# Patient Record
Sex: FEMALE | Race: WHITE | NOT HISPANIC OR LATINO | Employment: FULL TIME | URBAN - METROPOLITAN AREA
[De-identification: names, ages, dates, MRNs, and addresses within clinical notes are randomized per-mention and may not be internally consistent; named-entity substitution may affect disease eponyms.]

---

## 2017-02-23 ENCOUNTER — OFFICE VISIT (OUTPATIENT)
Dept: URGENT CARE | Facility: CLINIC | Age: 52
End: 2017-02-23
Payer: COMMERCIAL

## 2017-02-23 VITALS
SYSTOLIC BLOOD PRESSURE: 104 MMHG | HEART RATE: 65 BPM | RESPIRATION RATE: 18 BRPM | TEMPERATURE: 97.7 F | DIASTOLIC BLOOD PRESSURE: 76 MMHG | OXYGEN SATURATION: 95 % | WEIGHT: 188 LBS

## 2017-02-23 DIAGNOSIS — J06.9 VIRAL UPPER RESPIRATORY ILLNESS: ICD-10-CM

## 2017-02-23 DIAGNOSIS — F43.9 STRESS AT HOME: ICD-10-CM

## 2017-02-23 PROCEDURE — 99213 OFFICE O/P EST LOW 20 MIN: CPT | Performed by: FAMILY MEDICINE

## 2017-02-23 RX ORDER — LEVOTHYROXINE SODIUM 0.05 MG/1
50 TABLET ORAL
COMMUNITY
End: 2021-04-27

## 2017-02-23 ASSESSMENT — ENCOUNTER SYMPTOMS
VOMITING: 0
HEMOPTYSIS: 0
HEADACHES: 1
MYALGIAS: 1
DOUBLE VISION: 0
SWEATS: 0
HEARTBURN: 0
CHILLS: 1
SORE THROAT: 0
SHORTNESS OF BREATH: 1
NAUSEA: 0
BLURRED VISION: 0
RHINORRHEA: 1
COUGH: 1
WHEEZING: 1
FEVER: 0
DIZZINESS: 0

## 2017-02-23 ASSESSMENT — COPD QUESTIONNAIRES: COPD: 0

## 2017-02-23 NOTE — MR AVS SNAPSHOT
Zehra Adam   2017 6:15 PM   Office Visit   MRN: [id number]    Department:  [facility]   Dept Phone:  [phone]    Description:  Female : 1965   Provider:  Paul Kirkpatrick M.D.           Reason for Visit     Cough x 10 days, nonproductive cough, chest congestion, wheezing and shortness of breath    Sinus Problem x 10 days, nasal congestion, stuffy and runny nose, post nasal drip.       Allergies as of 2017     Allergen Noted Reactions    Codeine 2010         Vital Signs     Blood Pressure Pulse Temperature Respirations Weight Oxygen Saturation    104/76 mmHg 65 36.5 °C (97.7 °F) 18 85.276 kg (188 lb) 95%    Smoking Status                   Never Smoker            Basic Information     Date Of Birth Sex Race Ethnicity Preferred Language    1965 Female Unknown Unknown English      Health Maintenance        Date Due Completion Dates    IMM DTaP/Tdap/Td Vaccine (1 - Tdap) 1982 ---    MAMMOGRAM 3/4/2009 3/4/2008, 3/4/2008, 2006, 2006    COLONOSCOPY 2013 ---    IMM INFLUENZA (1) 2016 ---    PAP SMEAR 2018            Current Immunizations     No immunizations on file.      Below and/or attached are the medications your provider expects you to take. Review all of your home medications and newly ordered medications with your provider and/or pharmacist. Follow medication instructions as directed by your provider and/or pharmacist. Please keep your medication list with you and share with your provider. Update the information when medications are discontinued, doses are changed, or new medications (including over-the-counter products) are added; and carry medication information at all times in the event of emergency situations     Allergies:  CODEINE - (reactions not documented)               Medications  Valid as of: 2017 -  6:47 PM    Generic Name Brand Name Tablet Size Instructions for use    Levothyroxine Sodium (Tab)  "SYNTHROID 50 MCG Take 50 mcg by mouth Every morning on an empty stomach.        .                 Medicines prescribed today were sent to:     [id number] - [place] - [address] [place] [address]    Phone: [phone] Fax: [phone]    Open 24 Hours?: No      Medication refill instructions:       If your prescription bottle indicates you have medication refills left, it is not necessary to call your provider’s office. Please contact your pharmacy and they will refill your medication.    If your prescription bottle indicates you do not have any refills left, you may request refills at any time through one of the following ways: The online [employer] system (except Urgent Care), by calling your provider’s office, or by asking your pharmacy to contact your provider’s office with a refill request. Medication refills are processed only during regular business hours and may not be available until the next business day. Your provider may request additional information or to have a follow-up visit with you prior to refilling your medication.   *Please Note: Medication refills are assigned a new Rx number when refilled electronically. Your pharmacy may indicate that no refills were authorized even though a new prescription for the same medication is available at the pharmacy. Please request the medicine by name with the pharmacy before contacting your provider for a refill.        Instructions    Upper Respiratory Infection, Adult  Most upper respiratory infections (URIs) are a viral infection of the air passages leading to the lungs. A URI affects the nose, throat, and upper air passages. The most common type of URI is nasopharyngitis and is typically referred to as \"the common cold.\"  URIs run their course and usually go away on their own. Most of the time, a URI does not require medical attention, but sometimes a bacterial infection in the upper airways can follow a viral infection. This is called a " secondary infection. Sinus and middle ear infections are common types of secondary upper respiratory infections.  Bacterial pneumonia can also complicate a URI. A URI can worsen asthma and chronic obstructive pulmonary disease (COPD). Sometimes, these complications can require emergency medical care and may be life threatening.   CAUSES  Almost all URIs are caused by viruses. A virus is a type of germ and can spread from one person to another.   RISKS FACTORS  You may be at risk for a URI if:   · You smoke.    · You have chronic heart or lung disease.  · You have a weakened defense (immune) system.    · You are very young or very old.    · You have nasal allergies or asthma.  · You work in crowded or poorly ventilated areas.  · You work in health care facilities or schools.  SIGNS AND SYMPTOMS   Symptoms typically develop 2-3 days after you come in contact with a cold virus. Most viral URIs last 7-10 days. However, viral URIs from the influenza virus (flu virus) can last 14-18 days and are typically more severe. Symptoms may include:   · Runny or stuffy (congested) nose.    · Sneezing.    · Cough.    · Sore throat.    · Headache.    · Fatigue.    · Fever.    · Loss of appetite.    · Pain in your forehead, behind your eyes, and over your cheekbones (sinus pain).  · Muscle aches.    DIAGNOSIS   Your health care provider may diagnose a URI by:  · Physical exam.  · Tests to check that your symptoms are not due to another condition such as:  ¨ Strep throat.  ¨ Sinusitis.  ¨ Pneumonia.  ¨ Asthma.  TREATMENT   A URI goes away on its own with time. It cannot be cured with medicines, but medicines may be prescribed or recommended to relieve symptoms. Medicines may help:  · Reduce your fever.  · Reduce your cough.  · Relieve nasal congestion.  HOME CARE INSTRUCTIONS   · Take medicines only as directed by your health care provider.    · Gargle warm saltwater or take cough drops to comfort your throat as directed by your health  care provider.  · Use a warm mist humidifier or inhale steam from a shower to increase air moisture. This may make it easier to breathe.  · Drink enough fluid to keep your urine clear or pale yellow.    · Eat soups and other clear broths and maintain good nutrition.    · Rest as needed.    · Return to work when your temperature has returned to normal or as your health care provider advises. You may need to stay home longer to avoid infecting others. You can also use a face mask and careful hand washing to prevent spread of the virus.  · Increase the usage of your inhaler if you have asthma.    · Do not use any tobacco products, including cigarettes, chewing tobacco, or electronic cigarettes. If you need help quitting, ask your health care provider.  PREVENTION   The best way to protect yourself from getting a cold is to practice good hygiene.   · Avoid oral or hand contact with people with cold symptoms.    · Wash your hands often if contact occurs.    There is no clear evidence that vitamin C, vitamin E, echinacea, or exercise reduces the chance of developing a cold. However, it is always recommended to get plenty of rest, exercise, and practice good nutrition.   SEEK MEDICAL CARE IF:   · You are getting worse rather than better.    · Your symptoms are not controlled by medicine.    · You have chills.  · You have worsening shortness of breath.  · You have brown or red mucus.  · You have yellow or brown nasal discharge.  · You have pain in your face, especially when you bend forward.  · You have a fever.  · You have swollen neck glands.  · You have pain while swallowing.  · You have white areas in the back of your throat.  SEEK IMMEDIATE MEDICAL CARE IF:   · You have severe or persistent:  ¨ Headache.  ¨ Ear pain.  ¨ Sinus pain.  ¨ Chest pain.  · You have chronic lung disease and any of the following:  ¨ Wheezing.  ¨ Prolonged cough.  ¨ Coughing up blood.  ¨ A change in your usual mucus.  · You have a stiff  neck.  · You have changes in your:  ¨ Vision.  ¨ Hearing.  ¨ Thinking.  ¨ Mood.  MAKE SURE YOU:   · Understand these instructions.  · Will watch your condition.  · Will get help right away if you are not doing well or get worse.     This information is not intended to replace advice given to you by your health care provider. Make sure you discuss any questions you have with your health care provider.     Document Released: 06/13/2002 Document Revised: 05/03/2016 Document Reviewed: 03/25/2015  [employer] Interactive Patient Education ©2016 [employer].      [employer]/articles/guided-meditations  Snifflex (amazon.com)          [employer] Access Code: [email or web address]  Expires: 3/25/2017  6:47 PM    HELIX BIOMEDIX  A secure, online tool to manage your health information     [employer]’s HELIX BIOMEDIX® is a secure, online tool that connects you to your personalized health information from the privacy of your home -- day or night - making it very easy for you to manage your healthcare. Once the activation process is completed, you can even access your medical information using the HELIX BIOMEDIX flakita, which is available for free in the Apple Flakita store or Google Play store.     HEL[employer] provides the following levels of access (as shown below):   My Chart Features   [facility] Doctor [facility]  Specialists [facility]  Urgent  Care Non-[facility]  Primary Care  Doctor   Email your healthcare team securely and privately 24/7 X X X    Manage appointments: schedule your next appointment; view details of past/upcoming appointments X      Request prescription refills. X      View recent personal medical records, including lab and immunizations X X X X   View health record, including health history, allergies, medications X X X X   Read reports about your outpatient visits, procedures, consult and ER notes X X X X   See your discharge summary, which is a recap of your hospital and/or ER visit that includes your diagnosis, lab results, and care plan. X  X       How to register for [employer]:  1. Go to  [email or web address].  2. Click on the Sign Up Now box, which takes you to the New Member Sign Up page. You will need to provide the following information:  a. Enter your [employer] Access Code exactly as it appears at the top of this page. (You will not need to use this code after you’ve completed the sign-up process. If you do not sign up before the expiration date, you must request a new code.)   b. Enter your date of birth.   c. Enter your home email address.   d. Click Submit, and follow the next screen’s instructions.  3. Create a [employer] ID. This will be your [employer] login ID and cannot be changed, so think of one that is secure and easy to remember.  4. Create a [employer] password. You can change your password at any time.  5. Enter your Password Reset Question and Answer. This can be used at a later time if you forget your password.   6. Enter your e-mail address. This allows you to receive e-mail notifications when new information is available in [employer].  7. Click Sign Up. You can now view your health information.    For assistance activating your [employer] account, call [phone]

## 2017-02-24 NOTE — PROGRESS NOTES
Subjective:      Zehra Adam is a 51 y.o. female who presents with Cough and Sinus Problem            Cough  This is a new problem. The current episode started in the past 7 days. The problem has been unchanged. The problem occurs constantly. The cough is productive of sputum. Associated symptoms include chills, headaches, myalgias, nasal congestion, postnasal drip, rhinorrhea, shortness of breath and wheezing. Pertinent negatives include no chest pain, ear congestion, ear pain, fever, heartburn, hemoptysis, rash, sore throat or sweats. The symptoms are aggravated by cold air (activity). Treatments tried: nyquil, tylenol for h/a. The treatment provided mild relief. Her past medical history is significant for asthma and bronchitis. There is no history of COPD, environmental allergies or pneumonia.   Sinus Problem  Associated symptoms include chills, coughing, headaches and shortness of breath. Pertinent negatives include no ear pain or sore throat.       Review of Systems   Constitutional: Positive for chills. Negative for fever.   HENT: Positive for postnasal drip and rhinorrhea. Negative for ear pain and sore throat.    Eyes: Negative for blurred vision and double vision.   Respiratory: Positive for cough, shortness of breath and wheezing. Negative for hemoptysis.    Cardiovascular: Negative for chest pain.   Gastrointestinal: Negative for heartburn, nausea and vomiting.   Musculoskeletal: Positive for myalgias.   Skin: Negative for rash.   Neurological: Positive for headaches. Negative for dizziness.   Endo/Heme/Allergies: Negative for environmental allergies.     PMH:  has a past medical history of Thyroid disease.  MEDS:   Current outpatient prescriptions:   •  levothyroxine (SYNTHROID) 50 MCG Tab, Take 50 mcg by mouth Every morning on an empty stomach., Disp: , Rfl:   ALLERGIES:   Allergies   Allergen Reactions   • Codeine      SURGHX: History reviewed. No pertinent past surgical history.  SOCHX:  reports  that she has never smoked. She does not have any smokeless tobacco history on file. She reports that she drinks alcohol. She reports that she does not use illicit drugs.  FH: Family history was reviewed, no pertinent findings to report       Objective:     /76 mmHg  Pulse 65  Temp(Src) 36.5 °C (97.7 °F)  Resp 18  Wt 85.276 kg (188 lb)  SpO2 95%     Physical Exam   Constitutional: She appears well-developed.   HENT:   Head: Normocephalic.   Right Ear: External ear normal.   Left Ear: External ear normal.   Mouth/Throat: Oropharyngeal exudate present.   Nasal congestion   Eyes: Pupils are equal, round, and reactive to light. Right eye exhibits no discharge. Left eye exhibits no discharge.   Neck: Neck supple. No thyromegaly present.   Cardiovascular: Normal rate.  Exam reveals no friction rub.    No murmur heard.  Pulmonary/Chest: Effort normal. No respiratory distress. She has no wheezes.   Abdominal: Soft. She exhibits no distension. There is no tenderness. There is no guarding.   Lymphadenopathy:     She has no cervical adenopathy.   Neurological: She is alert.   Skin: Skin is warm and dry. No erythema.   Psychiatric: She has a normal mood and affect. Her behavior is normal.               Assessment/Plan:     1. Viral upper respiratory illness     2. Stress at home       Supportive care  Push fluids  Monitor temperature  Follow-up if symptoms worsen or fail to improve    Discussed stress reduction techniques, proper diet  Consider medication and Snifflex

## 2017-02-24 NOTE — PATIENT INSTRUCTIONS
"Upper Respiratory Infection, Adult  Most upper respiratory infections (URIs) are a viral infection of the air passages leading to the lungs. A URI affects the nose, throat, and upper air passages. The most common type of URI is nasopharyngitis and is typically referred to as \"the common cold.\"  URIs run their course and usually go away on their own. Most of the time, a URI does not require medical attention, but sometimes a bacterial infection in the upper airways can follow a viral infection. This is called a secondary infection. Sinus and middle ear infections are common types of secondary upper respiratory infections.  Bacterial pneumonia can also complicate a URI. A URI can worsen asthma and chronic obstructive pulmonary disease (COPD). Sometimes, these complications can require emergency medical care and may be life threatening.   CAUSES  Almost all URIs are caused by viruses. A virus is a type of germ and can spread from one person to another.   RISKS FACTORS  You may be at risk for a URI if:   · You smoke.    · You have chronic heart or lung disease.  · You have a weakened defense (immune) system.    · You are very young or very old.    · You have nasal allergies or asthma.  · You work in crowded or poorly ventilated areas.  · You work in health care facilities or schools.  SIGNS AND SYMPTOMS   Symptoms typically develop 2-3 days after you come in contact with a cold virus. Most viral URIs last 7-10 days. However, viral URIs from the influenza virus (flu virus) can last 14-18 days and are typically more severe. Symptoms may include:   · Runny or stuffy (congested) nose.    · Sneezing.    · Cough.    · Sore throat.    · Headache.    · Fatigue.    · Fever.    · Loss of appetite.    · Pain in your forehead, behind your eyes, and over your cheekbones (sinus pain).  · Muscle aches.    DIAGNOSIS   Your health care provider may diagnose a URI by:  · Physical exam.  · Tests to check that your symptoms are not due to " another condition such as:  ¨ Strep throat.  ¨ Sinusitis.  ¨ Pneumonia.  ¨ Asthma.  TREATMENT   A URI goes away on its own with time. It cannot be cured with medicines, but medicines may be prescribed or recommended to relieve symptoms. Medicines may help:  · Reduce your fever.  · Reduce your cough.  · Relieve nasal congestion.  HOME CARE INSTRUCTIONS   · Take medicines only as directed by your health care provider.    · Gargle warm saltwater or take cough drops to comfort your throat as directed by your health care provider.  · Use a warm mist humidifier or inhale steam from a shower to increase air moisture. This may make it easier to breathe.  · Drink enough fluid to keep your urine clear or pale yellow.    · Eat soups and other clear broths and maintain good nutrition.    · Rest as needed.    · Return to work when your temperature has returned to normal or as your health care provider advises. You may need to stay home longer to avoid infecting others. You can also use a face mask and careful hand washing to prevent spread of the virus.  · Increase the usage of your inhaler if you have asthma.    · Do not use any tobacco products, including cigarettes, chewing tobacco, or electronic cigarettes. If you need help quitting, ask your health care provider.  PREVENTION   The best way to protect yourself from getting a cold is to practice good hygiene.   · Avoid oral or hand contact with people with cold symptoms.    · Wash your hands often if contact occurs.    There is no clear evidence that vitamin C, vitamin E, echinacea, or exercise reduces the chance of developing a cold. However, it is always recommended to get plenty of rest, exercise, and practice good nutrition.   SEEK MEDICAL CARE IF:   · You are getting worse rather than better.    · Your symptoms are not controlled by medicine.    · You have chills.  · You have worsening shortness of breath.  · You have brown or red mucus.  · You have yellow or brown nasal  discharge.  · You have pain in your face, especially when you bend forward.  · You have a fever.  · You have swollen neck glands.  · You have pain while swallowing.  · You have white areas in the back of your throat.  SEEK IMMEDIATE MEDICAL CARE IF:   · You have severe or persistent:  ¨ Headache.  ¨ Ear pain.  ¨ Sinus pain.  ¨ Chest pain.  · You have chronic lung disease and any of the following:  ¨ Wheezing.  ¨ Prolonged cough.  ¨ Coughing up blood.  ¨ A change in your usual mucus.  · You have a stiff neck.  · You have changes in your:  ¨ Vision.  ¨ Hearing.  ¨ Thinking.  ¨ Mood.  MAKE SURE YOU:   · Understand these instructions.  · Will watch your condition.  · Will get help right away if you are not doing well or get worse.     This information is not intended to replace advice given to you by your health care provider. Make sure you discuss any questions you have with your health care provider.     Document Released: 06/13/2002 Document Revised: 05/03/2016 Document Reviewed: 03/25/2015  [email or web address] Interactive Patient Education ©2016 [email or web address]      [employer]/articles/guided-meditations  Snifflex (amazon.com)

## 2017-12-08 ENCOUNTER — HOSPITAL ENCOUNTER (OUTPATIENT)
Dept: LAB | Facility: MEDICAL CENTER | Age: 52
End: 2017-12-08
Attending: FAMILY MEDICINE
Payer: COMMERCIAL

## 2017-12-08 LAB
25(OH)D3 SERPL-MCNC: 35 NG/ML (ref 30–100)
ALBUMIN SERPL BCP-MCNC: 4.3 G/DL (ref 3.2–4.9)
ALBUMIN/GLOB SERPL: 1.9 G/DL
ALP SERPL-CCNC: 92 U/L (ref 30–99)
ALT SERPL-CCNC: 27 U/L (ref 2–50)
ANION GAP SERPL CALC-SCNC: 6 MMOL/L (ref 0–11.9)
AST SERPL-CCNC: 24 U/L (ref 12–45)
BASOPHILS # BLD AUTO: 0.8 % (ref 0–1.8)
BASOPHILS # BLD: 0.04 K/UL (ref 0–0.12)
BILIRUB SERPL-MCNC: 0.5 MG/DL (ref 0.1–1.5)
BUN SERPL-MCNC: 11 MG/DL (ref 8–22)
CALCIUM SERPL-MCNC: 9.9 MG/DL (ref 8.5–10.5)
CHLORIDE SERPL-SCNC: 107 MMOL/L (ref 96–112)
CHOLEST SERPL-MCNC: 264 MG/DL (ref 100–199)
CO2 SERPL-SCNC: 27 MMOL/L (ref 20–33)
CREAT SERPL-MCNC: 0.64 MG/DL (ref 0.5–1.4)
EOSINOPHIL # BLD AUTO: 0.1 K/UL (ref 0–0.51)
EOSINOPHIL NFR BLD: 2 % (ref 0–6.9)
ERYTHROCYTE [DISTWIDTH] IN BLOOD BY AUTOMATED COUNT: 44.1 FL (ref 35.9–50)
GFR SERPL CREATININE-BSD FRML MDRD: >60 ML/MIN/1.73 M 2
GLOBULIN SER CALC-MCNC: 2.3 G/DL (ref 1.9–3.5)
GLUCOSE SERPL-MCNC: 90 MG/DL (ref 65–99)
HCT VFR BLD AUTO: 44.2 % (ref 37–47)
HDLC SERPL-MCNC: 69 MG/DL
HGB BLD-MCNC: 14.5 G/DL (ref 12–16)
IMM GRANULOCYTES # BLD AUTO: 0.02 K/UL (ref 0–0.11)
IMM GRANULOCYTES NFR BLD AUTO: 0.4 % (ref 0–0.9)
LDLC SERPL CALC-MCNC: 176 MG/DL
LYMPHOCYTES # BLD AUTO: 1.87 K/UL (ref 1–4.8)
LYMPHOCYTES NFR BLD: 37.6 % (ref 22–41)
MCH RBC QN AUTO: 30.1 PG (ref 27–33)
MCHC RBC AUTO-ENTMCNC: 32.8 G/DL (ref 33.6–35)
MCV RBC AUTO: 91.7 FL (ref 81.4–97.8)
MONOCYTES # BLD AUTO: 0.57 K/UL (ref 0–0.85)
MONOCYTES NFR BLD AUTO: 11.4 % (ref 0–13.4)
NEUTROPHILS # BLD AUTO: 2.38 K/UL (ref 2–7.15)
NEUTROPHILS NFR BLD: 47.8 % (ref 44–72)
NRBC # BLD AUTO: 0 K/UL
NRBC BLD AUTO-RTO: 0 /100 WBC
PLATELET # BLD AUTO: 353 K/UL (ref 164–446)
PMV BLD AUTO: 10.6 FL (ref 9–12.9)
POTASSIUM SERPL-SCNC: 4.2 MMOL/L (ref 3.6–5.5)
PROT SERPL-MCNC: 6.6 G/DL (ref 6–8.2)
RBC # BLD AUTO: 4.82 M/UL (ref 4.2–5.4)
SODIUM SERPL-SCNC: 140 MMOL/L (ref 135–145)
TRIGL SERPL-MCNC: 94 MG/DL (ref 0–149)
TSH SERPL DL<=0.005 MIU/L-ACNC: 3.1 UIU/ML (ref 0.3–3.7)
WBC # BLD AUTO: 5 K/UL (ref 4.8–10.8)

## 2017-12-08 PROCEDURE — 85025 COMPLETE CBC W/AUTO DIFF WBC: CPT

## 2017-12-08 PROCEDURE — 36415 COLL VENOUS BLD VENIPUNCTURE: CPT

## 2017-12-08 PROCEDURE — 80053 COMPREHEN METABOLIC PANEL: CPT

## 2017-12-08 PROCEDURE — 80061 LIPID PANEL: CPT

## 2017-12-08 PROCEDURE — 82306 VITAMIN D 25 HYDROXY: CPT

## 2017-12-08 PROCEDURE — 84443 ASSAY THYROID STIM HORMONE: CPT

## 2018-01-19 ENCOUNTER — APPOINTMENT (OUTPATIENT)
Dept: DERMATOLOGY | Facility: IMAGING CENTER | Age: 53
End: 2018-01-19

## 2018-03-30 ENCOUNTER — APPOINTMENT (RX ONLY)
Dept: URBAN - METROPOLITAN AREA CLINIC 4 | Facility: CLINIC | Age: 53
Setting detail: DERMATOLOGY
End: 2018-03-30

## 2018-03-30 DIAGNOSIS — L57.0 ACTINIC KERATOSIS: ICD-10-CM

## 2018-03-30 DIAGNOSIS — L82.1 OTHER SEBORRHEIC KERATOSIS: ICD-10-CM

## 2018-03-30 PROBLEM — D48.5 NEOPLASM OF UNCERTAIN BEHAVIOR OF SKIN: Status: ACTIVE | Noted: 2018-03-30

## 2018-03-30 PROCEDURE — ? LIQUID NITROGEN

## 2018-03-30 PROCEDURE — 99212 OFFICE O/P EST SF 10 MIN: CPT | Mod: 25

## 2018-03-30 PROCEDURE — ? COUNSELING

## 2018-03-30 PROCEDURE — 11100: CPT | Mod: 59

## 2018-03-30 PROCEDURE — 17000 DESTRUCT PREMALG LESION: CPT

## 2018-03-30 PROCEDURE — ? BIOPSY BY SHAVE METHOD

## 2018-03-30 PROCEDURE — 17003 DESTRUCT PREMALG LES 2-14: CPT

## 2018-03-30 ASSESSMENT — LOCATION DETAILED DESCRIPTION DERM
LOCATION DETAILED: LEFT CENTRAL TEMPLE
LOCATION DETAILED: NASAL DORSUM
LOCATION DETAILED: LEFT NASAL SIDEWALL
LOCATION DETAILED: LEFT DISTAL PRETIBIAL REGION

## 2018-03-30 ASSESSMENT — LOCATION SIMPLE DESCRIPTION DERM
LOCATION SIMPLE: LEFT NOSE
LOCATION SIMPLE: LEFT TEMPLE
LOCATION SIMPLE: NOSE
LOCATION SIMPLE: LEFT PRETIBIAL REGION

## 2018-03-30 ASSESSMENT — LOCATION ZONE DERM
LOCATION ZONE: LEG
LOCATION ZONE: NOSE
LOCATION ZONE: FACE

## 2018-03-30 NOTE — PROCEDURE: BIOPSY BY SHAVE METHOD
Destruction After The Procedure: No
Size Of Lesion In Cm: 0
Electrodesiccation And Curettage Text: The wound bed was treated with electrodesiccation and curettage after the biopsy was performed.
Notification Instructions: Patient will be notified of biopsy results. However, patient instructed to call the office if not contacted within 2 weeks.
Consent: Written consent was obtained and risks were reviewed including but not limited to scarring, infection, bleeding, scabbing, incomplete removal, nerve damage and allergy to anesthesia.
Type Of Destruction Used: Curettage
Anesthesia Volume In Cc: 2
Wound Care: Bacitracin
Post-Care Instructions: I reviewed with the patient in detail post-care instructions. Patient is to keep the biopsy site dry overnight, and then apply bacitracin twice daily until healed. Patient may apply hydrogen peroxide soaks to remove any crusting.
Dressing: bandage
Cryotherapy Text: The wound bed was treated with cryotherapy after the biopsy was performed.
Silver Nitrate Text: The wound bed was treated with silver nitrate after the biopsy was performed.
Hemostasis: Drysol
Electrodesiccation Text: The wound bed was treated with electrodesiccation after the biopsy was performed.
Anesthesia Type: 1% lidocaine with epinephrine
Biopsy Type: H and E
Lab: [id number]
Curettage Text: The wound bed was treated with curettage after the biopsy was performed.
Detail Level: Detailed
Biopsy Method: Personna blade
Lab Facility: 
Billing Type: Third-Party Bill

## 2019-02-13 ENCOUNTER — OFFICE VISIT (OUTPATIENT)
Dept: URGENT CARE | Facility: CLINIC | Age: 54
End: 2019-02-13
Payer: COMMERCIAL

## 2019-02-13 VITALS
TEMPERATURE: 97.8 F | DIASTOLIC BLOOD PRESSURE: 80 MMHG | RESPIRATION RATE: 16 BRPM | SYSTOLIC BLOOD PRESSURE: 110 MMHG | OXYGEN SATURATION: 97 % | BODY MASS INDEX: 25.62 KG/M2 | HEIGHT: 69 IN | HEART RATE: 70 BPM | WEIGHT: 173 LBS

## 2019-02-13 DIAGNOSIS — J02.8 SORE THROAT (VIRAL): ICD-10-CM

## 2019-02-13 DIAGNOSIS — J11.1 INFLUENZA-LIKE ILLNESS: Primary | ICD-10-CM

## 2019-02-13 DIAGNOSIS — R05.9 COUGH: ICD-10-CM

## 2019-02-13 DIAGNOSIS — B97.89 SORE THROAT (VIRAL): ICD-10-CM

## 2019-02-13 LAB
INT CON NEG: NORMAL
INT CON POS: NORMAL
S PYO AG THROAT QL: NEGATIVE

## 2019-02-13 PROCEDURE — 99214 OFFICE O/P EST MOD 30 MIN: CPT | Performed by: PHYSICIAN ASSISTANT

## 2019-02-13 PROCEDURE — 87880 STREP A ASSAY W/OPTIC: CPT | Performed by: PHYSICIAN ASSISTANT

## 2019-02-13 RX ORDER — OSELTAMIVIR PHOSPHATE 75 MG/1
75 CAPSULE ORAL 2 TIMES DAILY
Qty: 10 CAP | Refills: 0 | Status: SHIPPED | OUTPATIENT
Start: 2019-02-13 | End: 2020-02-04

## 2019-02-13 NOTE — LETTER
February 13, 2019         Patient: Zehra Cramer   YOB: 1965   Date of Visit: 2/13/2019           To Whom it May Concern:    Zehra Cramer was seen in my clinic on 2/13/2019. She may return to work on 2/18/2019 or sooner if condition improves sooner.     If you have any questions or concerns, please don't hesitate to call.        Sincerely,           Tracy Perez P.A.-C.  Electronically Signed

## 2019-02-13 NOTE — PROGRESS NOTES
Subjective:      Zehra Cramer is a 53 y.o. female who presents with Pharyngitis (x yesterday, sore throat, pain to swallow, white spots on back of throat, fever and bodyaches)    PMH:  has a past medical history of Thyroid disease.  MEDS:   Current Outpatient Prescriptions:   •  Hydrocod Polst-CPM Polst ER (TUSSIONEX) 10-8 MG/5ML Suspension Extended Release, Take 5 mL by mouth every 12 hours for 7 days., Disp: 70 mL, Rfl: 0  •  oseltamivir (TAMIFLU) 75 MG Cap, Take 1 Cap by mouth 2 times a day., Disp: 10 Cap, Rfl: 0  •  levothyroxine (SYNTHROID) 50 MCG Tab, Take 50 mcg by mouth Every morning on an empty stomach., Disp: , Rfl:   •  polymixin-trimethoprim (POLYTRIM) 91419-8.1 UNIT/ML-% Solution, Place 1 Drop in both eyes every 4 hours., Disp: 10 mL, Rfl: 0  ALLERGIES:   Allergies   Allergen Reactions   • Codeine      SURGHX: History reviewed. No pertinent surgical history.  SOCHX:  reports that she has never smoked. She has never used smokeless tobacco. She reports that she drinks alcohol. She reports that she does not use drugs.  FH: Reviewed with patient, not pertinent to this visit.           Patient presents with:  Pharyngitis: x yesterday, sore throat, pain to swallow, white spots on back of throat, fever and body aches, cough , congestion.           URI    This is a new problem. The current episode started yesterday. The problem has been rapidly worsening. The maximum temperature recorded prior to her arrival was 100.4 - 100.9 F. The fever has been present for less than 1 day. Associated symptoms include congestion, coughing, ear pain, headaches, a plugged ear sensation and a sore throat. Pertinent negatives include no vomiting or wheezing. She has tried acetaminophen and increased fluids for the symptoms. The treatment provided no relief.       Review of Systems   Constitutional: Positive for fever.   HENT: Positive for congestion, ear pain and sore throat.    Respiratory: Positive for cough and  "sputum production. Negative for wheezing.    Gastrointestinal: Negative for vomiting.   Neurological: Positive for headaches.   All other systems reviewed and are negative.         Objective:     /80 (BP Location: Left arm, Patient Position: Sitting, BP Cuff Size: Large adult)   Pulse 70   Temp 36.6 °C (97.8 °F) (Temporal)   Resp 16   Ht 1.753 m (5' 9\")   Wt 78.5 kg (173 lb)   SpO2 97%   BMI 25.55 kg/m²      Physical Exam   Constitutional: She is oriented to person, place, and time. She appears well-developed and well-nourished. She appears ill. No distress.   HENT:   Head: Normocephalic.   Right Ear: External ear normal.   Left Ear: External ear normal.   Mouth/Throat: Oropharynx is clear and moist.   Eyes: Pupils are equal, round, and reactive to light. EOM are normal.   Neck: Normal range of motion. Neck supple.   Cardiovascular: Normal rate and regular rhythm.    Pulmonary/Chest: Effort normal. No respiratory distress. She has rhonchi. She has no rales.   Abdominal: Soft.   Musculoskeletal: Normal range of motion.   Lymphadenopathy:     She has no cervical adenopathy.   Neurological: She is alert and oriented to person, place, and time.   Skin: Skin is warm and dry.   Psychiatric: She has a normal mood and affect.   Nursing note and vitals reviewed.         Assessment/Plan:     1. Influenza-like illness  Hydrocod Polst-CPM Polst ER (TUSSIONEX) 10-8 MG/5ML Suspension Extended Release    oseltamivir (TAMIFLU) 75 MG Cap   2. Cough  Hydrocod Polst-CPM Polst ER (TUSSIONEX) 10-8 MG/5ML Suspension Extended Release    oseltamivir (TAMIFLU) 75 MG Cap   3. Sore throat (viral)  Hydrocod Polst-CPM Polst ER (TUSSIONEX) 10-8 MG/5ML Suspension Extended Release    oseltamivir (TAMIFLU) 75 MG Cap     Motrin/Advil/Ibuprophen 600 mg every 6 hours as needed for pain or fever.    PT instructed not to drive or operate heavy machinery or drink alcohol while taking this medication because it contains either a narcotic or " benzodiazepines which causes drowsiness. PT verbalized understanding of these instructions.     [facility] Aware web site evaluation: I have obtained and reviewed patient utilization report from [facility] pharmacy database prior to writing prescription for controlled substance.  No history of abuse.    PT should follow up with PCP in 1-2 days for re-evaluation if symptoms have not improved.  Discussed red flags and reasons to return to UC or ED.  Pt and/or family verbalized understanding of diagnosis and follow up instructions and was offered informational handout on diagnosis.  PT discharged.

## 2019-02-15 ENCOUNTER — OFFICE VISIT (OUTPATIENT)
Dept: URGENT CARE | Facility: MEDICAL CENTER | Age: 54
End: 2019-02-15
Payer: COMMERCIAL

## 2019-02-15 VITALS
SYSTOLIC BLOOD PRESSURE: 124 MMHG | HEART RATE: 72 BPM | BODY MASS INDEX: 25.4 KG/M2 | TEMPERATURE: 98.5 F | DIASTOLIC BLOOD PRESSURE: 76 MMHG | OXYGEN SATURATION: 95 % | WEIGHT: 172 LBS | RESPIRATION RATE: 16 BRPM

## 2019-02-15 DIAGNOSIS — H10.31 ACUTE BACTERIAL CONJUNCTIVITIS OF RIGHT EYE: Primary | ICD-10-CM

## 2019-02-15 PROCEDURE — 99214 OFFICE O/P EST MOD 30 MIN: CPT | Performed by: PHYSICIAN ASSISTANT

## 2019-02-15 RX ORDER — POLYMYXIN B SULFATE AND TRIMETHOPRIM 1; 10000 MG/ML; [USP'U]/ML
1 SOLUTION OPHTHALMIC EVERY 4 HOURS
Qty: 10 ML | Refills: 0 | Status: SHIPPED | OUTPATIENT
Start: 2019-02-15 | End: 2020-02-04

## 2019-02-15 ASSESSMENT — ENCOUNTER SYMPTOMS
VOMITING: 0
HEADACHES: 1
WHEEZING: 0
SPUTUM PRODUCTION: 1
FEVER: 1
COUGH: 1
SORE THROAT: 1

## 2019-02-16 NOTE — PROGRESS NOTES
"Subjective:      Pt is a 53 y.o. female who presents with Eye Drainage            HPI  This is a new problem. Pt presents to the urgent care today with a CC of a watery, swollen, red right eye. Pt states this started this morning. Pt notes her  had this issue a few days ago. Pt states the pain in the eye is 3/10, mostly feels like pressure and aching with redness and yellow matting. Admits to the eye feeling \"itchy\" and that vision is slightly blurred. Pt denies double vision. Pt denies CP, SOB, NVD, paresthesias, headaches, dizziness, hives, or joint pain. Pt has not taken any medications for this condition. The pt's medication list, problem list, and allergies have been evaluated and reviewed during today's visit.    PMH:  Past Medical History:   Diagnosis Date   • Thyroid disease        PSH:  Negative per pt.      Fam Hx:  the patient's family history is not pertinent to their current complaint    Soc HX:  Social History     Social History   • Marital status:      Spouse name: N/A   • Number of children: N/A   • Years of education: N/A     Occupational History   • Not on file.     Social History Main Topics   • Smoking status: Never Smoker   • Smokeless tobacco: Never Used   • Alcohol use Yes      Comment: occasional 2 drinks   • Drug use: No   • Sexual activity: Not on file     Other Topics Concern   • Not on file     Social History Narrative    ** Merged History Encounter **              Medications:    Current Outpatient Prescriptions:   •  polymixin-trimethoprim (POLYTRIM) 58831-2.1 UNIT/ML-% Solution, Place 1 Drop in both eyes every 4 hours., Disp: 10 mL, Rfl: 0  •  Hydrocod Polst-CPM Polst ER (TUSSIONEX) 10-8 MG/5ML Suspension Extended Release, Take 5 mL by mouth every 12 hours for 7 days., Disp: 70 mL, Rfl: 0  •  oseltamivir (TAMIFLU) 75 MG Cap, Take 1 Cap by mouth 2 times a day., Disp: 10 Cap, Rfl: 0  •  levothyroxine (SYNTHROID) 50 MCG Tab, Take 50 mcg by mouth Every morning on an empty " stomach., Disp: , Rfl:       Allergies:  Codeine    ROS  Constitutional: Negative for fever, chills and malaise/fatigue.   HENT: Negative for congestion and sore throat.    Eyes: Positive for blurred vision, eye fullness/itchy sensation, yellow matting/discharge and redness. Negative for double vision and photophobia.   Respiratory: Negative for cough and shortness of breath.  Cardiovascular: Negative for chest pain and palpitations.   Gastrointestinal: Negative for nausea, vomiting, abdominal pain, diarrhea and constipation.   Genitourinary: Negative for dysuria and flank pain.   Musculoskeletal: Negative for joint pain and myalgias.   Skin: Negative for itching and rash.   Neurological: Negative for dizziness, tingling, weakness and headaches.   Endo/Heme/Allergies: Does not bruise/bleed easily.   Psychiatric/Behavioral: Negative for depression. The patient is not nervous/anxious.    All other systems reviewed and are negative.         Objective:     /76   Pulse 72   Temp 36.9 °C (98.5 °F)   Resp 16   Wt 78 kg (172 lb)   SpO2 95%   BMI 25.40 kg/m²      Physical Exam       Constitutional: PT is oriented to person, place, and time. PT appears well-developed and well-nourished. No distress.   HENT:   Head: Normocephalic and atraumatic.   Nose: Nose normal.   Mouth/Throat: Oropharynx is clear and moist. No oropharyngeal exudate.   Eyes: EOM are normal. Conjunctiva on right injected. Pupils are equal, round, and reactive to light. Right eye exhibits discharge. Left eye exhibits no discharge. No scleral icterus.   Neck: Normal range of motion. Neck supple. No thyromegaly present.   Cardiovascular: Normal rate, regular rhythm, normal heart sounds and intact distal pulses.  Exam reveals no gallop and no friction rub.    No murmur heard.  Pulmonary/Chest: Breath sounds normal. No respiratory distress. PT has no wheezes. PT has no rales. PT exhibits no tenderness.   Abdominal: Soft. Bowel sounds are normal. PT  exhibits no distension and no mass. There is no tenderness. There is no rebound and no guarding.   Musculoskeletal: Normal range of motion. PT exhibits no edema and no tenderness.   Neurological: PT is alert and oriented to person, place, and time. No cranial nerve deficit.   Skin: Skin is warm and dry. No rash noted. No erythema.   Psychiatric: PT has a normal mood and affect. PT behavior is normal. Judgment and thought content normal.        Assessment/Plan:     1. Acute bacterial conjunctivitis of right eye    - polymixin-trimethoprim (POLYTRIM) 58268-1.1 UNIT/ML-% Solution; Place 1 Drop in both eyes every 4 hours.  Dispense: 10 mL; Refill: 0    Warm compresses for right eye encouraged and discussed  Rest, fluids encouraged.  OTC decongestant for congestion  AVS with medical info given.  Pt was in full understanding and agreement with the plan.  Follow-up as needed if symptoms worsen or fail to improve.

## 2019-02-16 NOTE — PATIENT INSTRUCTIONS
Bacterial Conjunctivitis  Introduction  Bacterial conjunctivitis is an infection of your conjunctiva. This is the clear membrane that covers the white part of your eye and the inner surface of your eyelid. This condition can make your eye:  · Red or pink.  · Itchy.  This condition is caused by bacteria. This condition spreads very easily from person to person (is contagious) and from one eye to the other eye.  Follow these instructions at home:  Medicines  · Take or apply your antibiotic medicine as told by your doctor. Do not stop taking or applying the antibiotic even if you start to feel better.  · Take or apply over-the-counter and prescription medicines only as told by your doctor.  · Do not touch your eyelid with the eye drop bottle or the ointment tube.  Managing discomfort  · Wipe any fluid from your eye with a warm, wet washcloth or a cotton ball.  · Place a cool, clean washcloth on your eye. Do this for 10-20 minutes, 3-4 times per day.  General instructions  · Do not wear contact lenses until the irritation is gone. Wear glasses until your doctor says it is okay to wear contacts.  · Do not wear eye makeup until your symptoms are gone. Throw away any old makeup.  · Change or wash your pillowcase every day.  · Do not share towels or washcloths with anyone.  · Wash your hands often with soap and water. Use paper towels to dry your hands.  · Do not touch or rub your eyes.  · Do not drive or use heavy machinery if your vision is blurry.  Contact a doctor if:  · You have a fever.  · Your symptoms do not get better after 10 days.  Get help right away if:  · You have a fever and your symptoms suddenly get worse.  · You have very bad pain when you move your eye.  · Your face:  ¨ Hurts.  ¨ Is red.  ¨ Is swollen.  · You have sudden loss of vision.  This information is not intended to replace advice given to you by your health care provider. Make sure you discuss any questions you have with your health care  \"Reviewed record in preparation for visit and have obtained the necessary documentation\"    Chief Complaint   Patient presents with    Sleep Problem     \"not able to stay asleep\", hx of right shoulder surgery 12/2014 sleep pattern has been off since, not able to sleep more than 2-4 hours at a time         1. Have you been to the ER, urgent care clinic since your last visit? Hospitalized since your last visit? No    2. Have you seen or consulted any other health care providers outside of the [address] [place] [address] since your last visit? Include any pap smears or colon screening.  No provider.  Document Released: 09/26/2009 Document Revised: 05/25/2017 Document Reviewed: 09/29/2016  © 2017 [employer]

## 2019-03-04 ENCOUNTER — HOSPITAL ENCOUNTER (OUTPATIENT)
Dept: LAB | Facility: MEDICAL CENTER | Age: 54
End: 2019-03-04
Attending: NURSE PRACTITIONER
Payer: COMMERCIAL

## 2019-03-04 LAB — HETEROPH AB SER QL: NEGATIVE

## 2019-03-04 PROCEDURE — 36415 COLL VENOUS BLD VENIPUNCTURE: CPT

## 2019-03-04 PROCEDURE — 86308 HETEROPHILE ANTIBODY SCREEN: CPT

## 2019-12-17 ENCOUNTER — TELEPHONE (OUTPATIENT)
Dept: CARDIOLOGY | Facility: MEDICAL CENTER | Age: 54
End: 2019-12-17

## 2019-12-17 NOTE — TELEPHONE ENCOUNTER
Received a phone call from Dr. Lomax.  Dr. Lomax is requesting that this patient's appointment with Dr. Pack be moved up sooner if possible.      Routed to Scheduling for f/u.    Dr. Pack: FYI only.

## 2019-12-26 ENCOUNTER — TELEPHONE (OUTPATIENT)
Dept: CARDIOLOGY | Facility: MEDICAL CENTER | Age: 54
End: 2019-12-26

## 2019-12-26 ENCOUNTER — OFFICE VISIT (OUTPATIENT)
Dept: CARDIOLOGY | Facility: MEDICAL CENTER | Age: 54
End: 2019-12-26
Payer: COMMERCIAL

## 2019-12-26 VITALS
SYSTOLIC BLOOD PRESSURE: 110 MMHG | BODY MASS INDEX: 27.59 KG/M2 | WEIGHT: 186.29 LBS | DIASTOLIC BLOOD PRESSURE: 70 MMHG | OXYGEN SATURATION: 94 % | HEIGHT: 69 IN | HEART RATE: 74 BPM

## 2019-12-26 DIAGNOSIS — I48.91 ATRIAL FIBRILLATION, UNSPECIFIED TYPE (HCC): ICD-10-CM

## 2019-12-26 LAB — EKG IMPRESSION: NORMAL

## 2019-12-26 PROCEDURE — 93000 ELECTROCARDIOGRAM COMPLETE: CPT | Performed by: INTERNAL MEDICINE

## 2019-12-26 PROCEDURE — 99204 OFFICE O/P NEW MOD 45 MIN: CPT | Performed by: INTERNAL MEDICINE

## 2019-12-26 RX ORDER — METOPROLOL SUCCINATE 25 MG/1
25 TABLET, EXTENDED RELEASE ORAL DAILY
Qty: 90 TAB | Refills: 3 | Status: SHIPPED | OUTPATIENT
Start: 2019-12-26 | End: 2020-02-04 | Stop reason: SDUPTHER

## 2019-12-26 ASSESSMENT — ENCOUNTER SYMPTOMS
FEVER: 0
ORTHOPNEA: 0
CONSTIPATION: 0
ALTERED MENTAL STATUS: 0
IRREGULAR HEARTBEAT: 0
SHORTNESS OF BREATH: 0
FLANK PAIN: 0
WEIGHT GAIN: 0
HEARTBURN: 0
COUGH: 0
DECREASED APPETITE: 0
NEAR-SYNCOPE: 0
DYSPNEA ON EXERTION: 0
DEPRESSION: 0
SYNCOPE: 0
ABDOMINAL PAIN: 0
DIARRHEA: 0
BLURRED VISION: 0
BACK PAIN: 0
NAUSEA: 0
PALPITATIONS: 0
PND: 0
VOMITING: 0
CLAUDICATION: 0
DIZZINESS: 0
WEIGHT LOSS: 0

## 2019-12-26 NOTE — PROGRESS NOTES
"Cardiology Note    Chief Complaint   Patient presents with   • Atrial Fibrillation       History of Present Illness: Zehra Adam is a 54 y.o. female who presents for evaluation.    Describes earlier this month felt palpitations. Was at a company m[employer] party. Woke up with palpitations. Unclear how long. Used apple watch to diagnose atrial fibrillation.  used to work for [employer] and was familiar with rhythm.    \"Measures coffee by liters.\"  says. Patient says has been trying to cut down to one cup. Alcohol <1 drink per day. Denies tobacco or illicit drugs.    Review of Systems   Constitution: Negative for decreased appetite, fever, malaise/fatigue, weight gain and weight loss.   HENT: Negative for congestion and nosebleeds.    Eyes: Negative for blurred vision.   Cardiovascular: Negative for chest pain, claudication, dyspnea on exertion, irregular heartbeat, leg swelling, near-syncope, orthopnea, palpitations, paroxysmal nocturnal dyspnea and syncope.   Respiratory: Negative for cough and shortness of breath.    Endocrine: Negative for cold intolerance and heat intolerance.   Skin: Negative for rash.   Musculoskeletal: Negative for back pain.   Gastrointestinal: Negative for abdominal pain, constipation, diarrhea, heartburn, melena, nausea and vomiting.   Genitourinary: Negative for dysuria, flank pain and hematuria.   Neurological: Negative for dizziness.   Psychiatric/Behavioral: Negative for altered mental status and depression.         Past Medical History:   Diagnosis Date   • Thyroid disease          No past surgical history on file.      Current Outpatient Medications   Medication Sig Dispense Refill   • aspirin EC (ECOTRIN) 81 MG Tablet Delayed Response Take 1 Tab by mouth every day. 90 Tab 3   • metoprolol SR (TOPROL XL) 25 MG TABLET SR 24 HR Take 1 Tab by mouth every day. 90 Tab 3   • levothyroxine (SYNTHROID) 50 MCG Tab Take 50 mcg by mouth Every morning on an empty " stomach.     • polymixin-trimethoprim (POLYTRIM) 30068-6.1 UNIT/ML-% Solution Place 1 Drop in both eyes every 4 hours. (Patient not taking: Reported on 12/26/2019) 10 mL 0   • oseltamivir (TAMIFLU) 75 MG Cap Take 1 Cap by mouth 2 times a day. (Patient not taking: Reported on 12/26/2019) 10 Cap 0     No current facility-administered medications for this visit.          Allergies   Allergen Reactions   • Codeine          No family history on file.      Social History     Socioeconomic History   • Marital status:      Spouse name: Not on file   • Number of children: Not on file   • Years of education: Not on file   • Highest education level: Not on file   Occupational History   • Not on file   Social Needs   • Financial resource strain: Not on file   • Food insecurity:     Worry: Not on file     Inability: Not on file   • Transportation needs:     Medical: Not on file     Non-medical: Not on file   Tobacco Use   • Smoking status: Never Smoker   • Smokeless tobacco: Never Used   Substance and Sexual Activity   • Alcohol use: Yes     Comment: occasional 2 drinks   • Drug use: No   • Sexual activity: Not on file   Lifestyle   • Physical activity:     Days per week: Not on file     Minutes per session: Not on file   • Stress: Not on file   Relationships   • Social connections:     Talks on phone: Not on file     Gets together: Not on file     Attends Orthodox service: Not on file     Active member of club or organization: Not on file     Attends meetings of clubs or organizations: Not on file     Relationship status: Not on file   • Intimate partner violence:     Fear of current or ex partner: Not on file     Emotionally abused: Not on file     Physically abused: Not on file     Forced sexual activity: Not on file   Other Topics Concern   • Not on file   Social History Narrative    ** Merged History Encounter **              Physical Exam:  Ambulatory Vitals  /70 (BP Location: Left arm, Patient Position:  "Sitting, BP Cuff Size: Adult)   Pulse 74   Ht 1.753 m (5' 9\")   Wt 84.5 kg (186 lb 4.6 oz)   SpO2 94%    BP Readings from Last 4 Encounters:   12/26/19 110/70   02/15/19 124/76   02/13/19 110/80   02/23/17 104/76     Weight/BMI:   Vitals:    12/26/19 1325   BP: 110/70   Weight: 84.5 kg (186 lb 4.6 oz)   Height: 1.753 m (5' 9\")    Body mass index is 27.51 kg/m².  Wt Readings from Last 4 Encounters:   12/26/19 84.5 kg (186 lb 4.6 oz)   02/15/19 78 kg (172 lb)   02/13/19 78.5 kg (173 lb)   02/23/17 85.3 kg (188 lb)       Physical Exam   Constitutional: She is oriented to person, place, and time and well-developed, well-nourished, and in no distress. No distress.   HENT:   Head: Normocephalic and atraumatic.   Eyes: Pupils are equal, round, and reactive to light. Conjunctivae are normal.   Neck: Normal range of motion. Neck supple. No JVD present.   Cardiovascular: Normal rate, regular rhythm, normal heart sounds and intact distal pulses. Exam reveals no gallop and no friction rub.   No murmur heard.  Pulmonary/Chest: Effort normal and breath sounds normal. No respiratory distress. She has no wheezes. She has no rales. She exhibits no tenderness.   Abdominal: Soft. Bowel sounds are normal. She exhibits no distension.   Musculoskeletal:         General: No edema.   Neurological: She is alert and oriented to person, place, and time.   Skin: Skin is warm and dry.   Psychiatric: Affect and judgment normal.       Lab Data Review:  Lab Results   Component Value Date/Time    CHOLSTRLTOT 264 (H) 12/08/2017 07:59 AM     (H) 12/08/2017 07:59 AM    HDL 69 12/08/2017 07:59 AM    TRIGLYCERIDE 94 12/08/2017 07:59 AM       Lab Results   Component Value Date/Time    SODIUM 140 12/08/2017 07:59 AM    POTASSIUM 4.2 12/08/2017 07:59 AM    CHLORIDE 107 12/08/2017 07:59 AM    CO2 27 12/08/2017 07:59 AM    GLUCOSE 90 12/08/2017 07:59 AM    BUN 11 12/08/2017 07:59 AM    CREATININE 0.64 12/08/2017 07:59 AM     CrCl cannot be " calculated (Patient's most recent lab result is older than the maximum 7 days allowed.).  Lab Results   Component Value Date/Time    ALKPHOSPHAT 92 12/08/2017 07:59 AM    ASTSGOT 24 12/08/2017 07:59 AM    ALTSGPT 27 12/08/2017 07:59 AM    TBILIRUBIN 0.5 12/08/2017 07:59 AM      Lab Results   Component Value Date/Time    WBC 5.0 12/08/2017 07:59 AM     No results found for: HBA1C  No components found for: TROP      Cardiac Imaging and Procedures Review:      TTE sinus with PACs and compensatory pause    Medical Decision Making:  Problem List Items Addressed This Visit     Atrial fibrillation (HCC)     Chadsvasc only significant for female gender. Will maintain on aspirin for now. Metoprolol for attempt maintenance sinus and rate control should it recur. Zio patch to check for burden. TTE to r/o structural abnormality.          Relevant Medications    metoprolol SR (TOPROL XL) 25 MG TABLET SR 24 HR    Other Relevant Orders    EKG (Completed)    EC-ECHOCARDIOGRAM COMPLETE W/O CONT    Holter Monitor / Event Recorder          It was my pleasure to meet with Ms. Adam.

## 2019-12-26 NOTE — TELEPHONE ENCOUNTER
Pt had question after appointment today regarding when metoprolol should be started. She requested a call back.     Per Dr. Harrell he had discussed with them that metoprolol can be held until after event monitor is completed at the request of pts  who works with [employer].    Called pt and unable to reach her. Message left regarding the above and to call back if further questions.

## 2019-12-26 NOTE — ASSESSMENT & PLAN NOTE
Chadsvasc only significant for female gender. Will maintain on aspirin for now. Metoprolol for attempt maintenance sinus and rate control should it recur. Zio patch to check for burden. TTE to r/o structural abnormality.

## 2019-12-26 NOTE — TELEPHONE ENCOUNTER
Message   Received: Today   Message Contents   Jen Gonzalez, Med Ass't  Beryl Snyder R.N.   Caller: Unspecified (1 week ago)             I called patient and they are coming in today at 1:20.     Thank you!   Jen GARCIA

## 2020-01-02 ENCOUNTER — HOSPITAL ENCOUNTER (OUTPATIENT)
Dept: CARDIOLOGY | Facility: MEDICAL CENTER | Age: 55
End: 2020-01-02
Attending: INTERNAL MEDICINE
Payer: COMMERCIAL

## 2020-01-02 DIAGNOSIS — I48.91 ATRIAL FIBRILLATION, UNSPECIFIED TYPE (HCC): ICD-10-CM

## 2020-01-02 LAB
LV EJECT FRACT MOD 2C 99903: 75.22
LV EJECT FRACT MOD 4C 99902: 70.47
LV EJECT FRACT MOD BP 99901: 73.94

## 2020-01-02 PROCEDURE — 93306 TTE W/DOPPLER COMPLETE: CPT | Mod: 26 | Performed by: INTERNAL MEDICINE

## 2020-01-02 PROCEDURE — 93306 TTE W/DOPPLER COMPLETE: CPT

## 2020-01-06 ENCOUNTER — HOSPITAL ENCOUNTER (OUTPATIENT)
Dept: LAB | Facility: MEDICAL CENTER | Age: 55
End: 2020-01-06
Attending: FAMILY MEDICINE
Payer: COMMERCIAL

## 2020-01-06 LAB
25(OH)D3 SERPL-MCNC: 43 NG/ML (ref 30–100)
ALBUMIN SERPL BCP-MCNC: 4.4 G/DL (ref 3.2–4.9)
ALBUMIN/GLOB SERPL: 1.5 G/DL
ALP SERPL-CCNC: 85 U/L (ref 30–99)
ALT SERPL-CCNC: 26 U/L (ref 2–50)
ANION GAP SERPL CALC-SCNC: 9 MMOL/L (ref 0–11.9)
AST SERPL-CCNC: 22 U/L (ref 12–45)
BASOPHILS # BLD AUTO: 1.5 % (ref 0–1.8)
BASOPHILS # BLD: 0.07 K/UL (ref 0–0.12)
BILIRUB SERPL-MCNC: 0.5 MG/DL (ref 0.1–1.5)
BUN SERPL-MCNC: 15 MG/DL (ref 8–22)
CALCIUM SERPL-MCNC: 9.5 MG/DL (ref 8.5–10.5)
CHLORIDE SERPL-SCNC: 104 MMOL/L (ref 96–112)
CHOLEST SERPL-MCNC: 264 MG/DL (ref 100–199)
CO2 SERPL-SCNC: 27 MMOL/L (ref 20–33)
CREAT SERPL-MCNC: 0.76 MG/DL (ref 0.5–1.4)
EOSINOPHIL # BLD AUTO: 0.09 K/UL (ref 0–0.51)
EOSINOPHIL NFR BLD: 1.9 % (ref 0–6.9)
ERYTHROCYTE [DISTWIDTH] IN BLOOD BY AUTOMATED COUNT: 45.6 FL (ref 35.9–50)
GLOBULIN SER CALC-MCNC: 3 G/DL (ref 1.9–3.5)
GLUCOSE SERPL-MCNC: 87 MG/DL (ref 65–99)
HCT VFR BLD AUTO: 43.9 % (ref 37–47)
HDLC SERPL-MCNC: 77 MG/DL
HGB BLD-MCNC: 14.1 G/DL (ref 12–16)
IMM GRANULOCYTES # BLD AUTO: 0.01 K/UL (ref 0–0.11)
IMM GRANULOCYTES NFR BLD AUTO: 0.2 % (ref 0–0.9)
LDLC SERPL CALC-MCNC: 172 MG/DL
LYMPHOCYTES # BLD AUTO: 1.73 K/UL (ref 1–4.8)
LYMPHOCYTES NFR BLD: 37 % (ref 22–41)
MCH RBC QN AUTO: 30 PG (ref 27–33)
MCHC RBC AUTO-ENTMCNC: 32.1 G/DL (ref 33.6–35)
MCV RBC AUTO: 93.4 FL (ref 81.4–97.8)
MONOCYTES # BLD AUTO: 0.63 K/UL (ref 0–0.85)
MONOCYTES NFR BLD AUTO: 13.5 % (ref 0–13.4)
NEUTROPHILS # BLD AUTO: 2.15 K/UL (ref 2–7.15)
NEUTROPHILS NFR BLD: 45.9 % (ref 44–72)
NRBC # BLD AUTO: 0 K/UL
NRBC BLD-RTO: 0 /100 WBC
PLATELET # BLD AUTO: 328 K/UL (ref 164–446)
PMV BLD AUTO: 10.5 FL (ref 9–12.9)
POTASSIUM SERPL-SCNC: 3.8 MMOL/L (ref 3.6–5.5)
PROT SERPL-MCNC: 7.4 G/DL (ref 6–8.2)
RBC # BLD AUTO: 4.7 M/UL (ref 4.2–5.4)
SODIUM SERPL-SCNC: 140 MMOL/L (ref 135–145)
TRIGL SERPL-MCNC: 77 MG/DL (ref 0–149)
TSH SERPL DL<=0.005 MIU/L-ACNC: 3.6 UIU/ML (ref 0.38–5.33)
WBC # BLD AUTO: 4.7 K/UL (ref 4.8–10.8)

## 2020-01-06 PROCEDURE — 36415 COLL VENOUS BLD VENIPUNCTURE: CPT

## 2020-01-06 PROCEDURE — 85025 COMPLETE CBC W/AUTO DIFF WBC: CPT

## 2020-01-06 PROCEDURE — 84443 ASSAY THYROID STIM HORMONE: CPT

## 2020-01-06 PROCEDURE — 80061 LIPID PANEL: CPT

## 2020-01-06 PROCEDURE — 80053 COMPREHEN METABOLIC PANEL: CPT

## 2020-01-06 PROCEDURE — 82306 VITAMIN D 25 HYDROXY: CPT

## 2020-01-14 ENCOUNTER — TELEPHONE (OUTPATIENT)
Dept: CARDIOLOGY | Facility: MEDICAL CENTER | Age: 55
End: 2020-01-14

## 2020-01-14 ENCOUNTER — NON-PROVIDER VISIT (OUTPATIENT)
Dept: CARDIOLOGY | Facility: MEDICAL CENTER | Age: 55
End: 2020-01-14
Payer: COMMERCIAL

## 2020-01-14 DIAGNOSIS — I48.91 ATRIAL FIBRILLATION, UNSPECIFIED TYPE (HCC): ICD-10-CM

## 2020-01-14 DIAGNOSIS — I49.1 ATRIAL ECTOPY: ICD-10-CM

## 2020-02-04 ENCOUNTER — OFFICE VISIT (OUTPATIENT)
Dept: CARDIOLOGY | Facility: MEDICAL CENTER | Age: 55
End: 2020-02-04
Payer: COMMERCIAL

## 2020-02-04 VITALS
HEIGHT: 69 IN | DIASTOLIC BLOOD PRESSURE: 80 MMHG | WEIGHT: 182.76 LBS | OXYGEN SATURATION: 96 % | BODY MASS INDEX: 27.07 KG/M2 | HEART RATE: 56 BPM | SYSTOLIC BLOOD PRESSURE: 100 MMHG | RESPIRATION RATE: 12 BRPM

## 2020-02-04 DIAGNOSIS — I48.91 ATRIAL FIBRILLATION, UNSPECIFIED TYPE (HCC): ICD-10-CM

## 2020-02-04 DIAGNOSIS — E78.5 HYPERLIPIDEMIA, UNSPECIFIED HYPERLIPIDEMIA TYPE: ICD-10-CM

## 2020-02-04 PROBLEM — Z00.00 HEALTHCARE MAINTENANCE: Status: ACTIVE | Noted: 2020-02-04

## 2020-02-04 PROCEDURE — 0298T PR EXT ECG > 48HR TO 21 DAY REVIEW AND INTERPRETATN: CPT | Performed by: INTERNAL MEDICINE

## 2020-02-04 PROCEDURE — 99214 OFFICE O/P EST MOD 30 MIN: CPT | Performed by: INTERNAL MEDICINE

## 2020-02-04 PROCEDURE — 0296T PR EXT ECG > 48HR TO 21 DAY RCRD W/CONECT INTL RCRD: CPT | Performed by: INTERNAL MEDICINE

## 2020-02-04 RX ORDER — METOPROLOL SUCCINATE 25 MG/1
25 TABLET, EXTENDED RELEASE ORAL DAILY
Qty: 90 TAB | Refills: 3 | Status: SHIPPED | OUTPATIENT
Start: 2020-02-04 | End: 2020-02-18 | Stop reason: SDUPTHER

## 2020-02-04 ASSESSMENT — ENCOUNTER SYMPTOMS
PALPITATIONS: 0
DEPRESSION: 0
FEVER: 0
WEIGHT GAIN: 0
ALTERED MENTAL STATUS: 0
HEARTBURN: 0
DYSPNEA ON EXERTION: 0
IRREGULAR HEARTBEAT: 0
PND: 0
NEAR-SYNCOPE: 0
CLAUDICATION: 0
SYNCOPE: 0
DIZZINESS: 0
BLURRED VISION: 0
CONSTIPATION: 0
DIARRHEA: 0
COUGH: 0
ABDOMINAL PAIN: 0
VOMITING: 0
FLANK PAIN: 0
BACK PAIN: 0
ORTHOPNEA: 0
SHORTNESS OF BREATH: 0
WEIGHT LOSS: 0
DECREASED APPETITE: 0
NAUSEA: 0

## 2020-02-04 NOTE — PROGRESS NOTES
Cardiology Note    Chief Complaint   Patient presents with   • Atrial Fibrillation     follow up       History of Present Illness: Zehra Adam is a 54 y.o. female who presents for evaluation.    Underwent zio patch testing which confirmed atrial fibrillation suspected on apple watch. She is already taking aspirin and will start metoprolol as well. She has decreased caffeine intake.    Regarding cholesterol, previously elevated LDL >190. Minimal improvement with diet and exercise. Patient wishes to attempt again and repeat lipids.     Review of Systems   Constitution: Negative for decreased appetite, fever, malaise/fatigue, weight gain and weight loss.   HENT: Negative for congestion and nosebleeds.    Eyes: Negative for blurred vision.   Cardiovascular: Negative for chest pain, claudication, dyspnea on exertion, irregular heartbeat, leg swelling, near-syncope, orthopnea, palpitations, paroxysmal nocturnal dyspnea and syncope.   Respiratory: Negative for cough and shortness of breath.    Endocrine: Negative for cold intolerance and heat intolerance.   Skin: Negative for rash.   Musculoskeletal: Negative for back pain.   Gastrointestinal: Negative for abdominal pain, constipation, diarrhea, heartburn, melena, nausea and vomiting.   Genitourinary: Negative for dysuria, flank pain and hematuria.   Neurological: Negative for dizziness.   Psychiatric/Behavioral: Negative for altered mental status and depression.         Past Medical History:   Diagnosis Date   • Thyroid disease          No past surgical history on file.      Current Outpatient Medications   Medication Sig Dispense Refill   • Multiple Vitamin (MULTI-VITAMIN PO) Take  by mouth.     • Glucosamine HCl (GLUCOSAMINE PO) Take  by mouth.     • VITAMIN D PO Take  by mouth.     • VITAMIN E PO Take  by mouth.     • TURMERIC PO Take  by mouth.     • metoprolol SR (TOPROL XL) 25 MG TABLET SR 24 HR Take 1 Tab by mouth every day. 90 Tab 3   • aspirin EC  "(ECOTRIN) 81 MG Tablet Delayed Response Take 1 Tab by mouth every day. 90 Tab 3   • levothyroxine (SYNTHROID) 50 MCG Tab Take 50 mcg by mouth Every morning on an empty stomach.       No current facility-administered medications for this visit.          Allergies   Allergen Reactions   • Codeine          No family history on file.      Social History     Socioeconomic History   • Marital status:      Spouse name: Not on file   • Number of children: Not on file   • Years of education: Not on file   • Highest education level: Not on file   Occupational History   • Not on file   Social Needs   • Financial resource strain: Not on file   • Food insecurity:     Worry: Not on file     Inability: Not on file   • Transportation needs:     Medical: Not on file     Non-medical: Not on file   Tobacco Use   • Smoking status: Never Smoker   • Smokeless tobacco: Never Used   Substance and Sexual Activity   • Alcohol use: Yes     Comment: occasional 2 drinks   • Drug use: No   • Sexual activity: Not on file   Lifestyle   • Physical activity:     Days per week: Not on file     Minutes per session: Not on file   • Stress: Not on file   Relationships   • Social connections:     Talks on phone: Not on file     Gets together: Not on file     Attends Gnosticist service: Not on file     Active member of club or organization: Not on file     Attends meetings of clubs or organizations: Not on file     Relationship status: Not on file   • Intimate partner violence:     Fear of current or ex partner: Not on file     Emotionally abused: Not on file     Physically abused: Not on file     Forced sexual activity: Not on file   Other Topics Concern   • Not on file   Social History Narrative    ** Merged History Encounter **              Physical Exam:  Ambulatory Vitals  /80 (BP Location: Right arm, Patient Position: Sitting, BP Cuff Size: Adult)   Pulse (!) 56   Resp 12   Ht 1.753 m (5' 9\")   Wt 82.9 kg (182 lb 12.2 oz)   SpO2 96% " "   BP Readings from Last 4 Encounters:   02/04/20 100/80   12/26/19 110/70   02/15/19 124/76   02/13/19 110/80     Weight/BMI:   Vitals:    02/04/20 1319   BP: 100/80   Weight: 82.9 kg (182 lb 12.2 oz)   Height: 1.753 m (5' 9\")    Body mass index is 26.99 kg/m².  Wt Readings from Last 4 Encounters:   02/04/20 82.9 kg (182 lb 12.2 oz)   12/26/19 84.5 kg (186 lb 4.6 oz)   02/15/19 78 kg (172 lb)   02/13/19 78.5 kg (173 lb)       Physical Exam   Constitutional: She is oriented to person, place, and time and well-developed, well-nourished, and in no distress. No distress.   HENT:   Head: Normocephalic and atraumatic.   Eyes: Pupils are equal, round, and reactive to light. Conjunctivae are normal.   Neck: Normal range of motion. Neck supple. No JVD present.   Cardiovascular: Normal rate, regular rhythm, normal heart sounds and intact distal pulses. Exam reveals no gallop and no friction rub.   No murmur heard.  Pulmonary/Chest: Effort normal and breath sounds normal. No respiratory distress. She has no wheezes. She has no rales. She exhibits no tenderness.   Abdominal: Soft. Bowel sounds are normal. She exhibits no distension.   Musculoskeletal:         General: No edema.   Neurological: She is alert and oriented to person, place, and time.   Skin: Skin is warm and dry.   Psychiatric: Affect and judgment normal.       Lab Data Review:  Lab Results   Component Value Date/Time    CHOLSTRLTOT 264 (H) 01/06/2020 07:43 AM     (H) 01/06/2020 07:43 AM    HDL 77 01/06/2020 07:43 AM    TRIGLYCERIDE 77 01/06/2020 07:43 AM       Lab Results   Component Value Date/Time    SODIUM 140 01/06/2020 07:43 AM    POTASSIUM 3.8 01/06/2020 07:43 AM    CHLORIDE 104 01/06/2020 07:43 AM    CO2 27 01/06/2020 07:43 AM    GLUCOSE 87 01/06/2020 07:43 AM    BUN 15 01/06/2020 07:43 AM    CREATININE 0.76 01/06/2020 07:43 AM     CrCl cannot be calculated (Patient's most recent lab result is older than the maximum 7 days allowed.).  Lab Results "   Component Value Date/Time    ALKPHOSPHAT 85 01/06/2020 07:43 AM    ASTSGOT 22 01/06/2020 07:43 AM    ALTSGPT 26 01/06/2020 07:43 AM    TBILIRUBIN 0.5 01/06/2020 07:43 AM      Lab Results   Component Value Date/Time    WBC 4.7 (L) 01/06/2020 07:43 AM     No results found for: HBA1C  No components found for: TROP      Cardiac Imaging and Procedures Review:      zio patch 14 days  Predominantly sinus rhythm with average heart rate 72 bpm. Atrial fibrillation with 1% burden, longest event 4h 7min, with HR ranging  bpm which was symptomatic to patient.     TTE 1/2020  CONCLUSIONS  Normal echocardiogram.    EKG 12/2019 sinus with PACs and compensatory pause    Medical Decision Making:  Problem List Items Addressed This Visit     Atrial fibrillation (HCC)     Currently chadsvasc 1 for gender only. Cont aspirin. Start metoprolol. Pursue carotid duplex for suspected right sided bruit ordered by PCP. Discussed options for rhythm control. At this time will remain on rate control.         Relevant Medications    metoprolol SR (TOPROL XL) 25 MG TABLET SR 24 HR    Hyperlipidemia     Elevated LDL. Goal 50% eduction. Wishes to attempt diet. Will repeat prior to next visit and address statin.         Relevant Medications    metoprolol SR (TOPROL XL) 25 MG TABLET SR 24 HR          It was my pleasure to meet with Ms. Adam.

## 2020-02-04 NOTE — ASSESSMENT & PLAN NOTE
Elevated LDL. Goal 50% eduction. Wishes to attempt diet. Will repeat prior to next visit and address statin.

## 2020-02-04 NOTE — ASSESSMENT & PLAN NOTE
Currently chadsvasc 1 for gender only. Cont aspirin. Start metoprolol. Pursue carotid duplex for suspected right sided bruit ordered by PCP. Discussed options for rhythm control. At this time will remain on rate control.

## 2020-02-12 ENCOUNTER — PATIENT MESSAGE (OUTPATIENT)
Dept: CARDIOLOGY | Facility: MEDICAL CENTER | Age: 55
End: 2020-02-12

## 2020-02-18 RX ORDER — METOPROLOL SUCCINATE 25 MG/1
25 TABLET, EXTENDED RELEASE ORAL DAILY
Qty: 90 TAB | Refills: 0 | Status: SHIPPED | OUTPATIENT
Start: 2020-02-18 | End: 2020-05-27 | Stop reason: SDUPTHER

## 2020-05-26 ENCOUNTER — PATIENT MESSAGE (OUTPATIENT)
Dept: CARDIOLOGY | Facility: MEDICAL CENTER | Age: 55
End: 2020-05-26

## 2020-05-26 DIAGNOSIS — I48.91 ATRIAL FIBRILLATION, UNSPECIFIED TYPE (HCC): ICD-10-CM

## 2020-05-27 ENCOUNTER — HOSPITAL ENCOUNTER (OUTPATIENT)
Dept: RADIOLOGY | Facility: MEDICAL CENTER | Age: 55
End: 2020-05-27
Attending: FAMILY MEDICINE
Payer: COMMERCIAL

## 2020-05-27 DIAGNOSIS — R09.89 BRUIT OF RIGHT CAROTID ARTERY: ICD-10-CM

## 2020-05-27 PROCEDURE — 93880 EXTRACRANIAL BILAT STUDY: CPT

## 2020-05-27 RX ORDER — METOPROLOL SUCCINATE 50 MG/1
50 TABLET, EXTENDED RELEASE ORAL DAILY
Qty: 90 TAB | Refills: 3 | Status: SHIPPED | OUTPATIENT
Start: 2020-05-27 | End: 2020-11-24

## 2020-06-01 ENCOUNTER — TELEMEDICINE (OUTPATIENT)
Dept: CARDIOLOGY | Facility: MEDICAL CENTER | Age: 55
End: 2020-06-01
Payer: COMMERCIAL

## 2020-06-01 VITALS — WEIGHT: 182 LBS | BODY MASS INDEX: 26.88 KG/M2

## 2020-06-01 DIAGNOSIS — I48.0 PAROXYSMAL ATRIAL FIBRILLATION (HCC): ICD-10-CM

## 2020-06-01 DIAGNOSIS — E78.5 HYPERLIPIDEMIA, UNSPECIFIED HYPERLIPIDEMIA TYPE: ICD-10-CM

## 2020-06-01 PROCEDURE — 99241 PR OFFICE CONSULTATION,LEVEL I: CPT | Mod: 95,CR | Performed by: INTERNAL MEDICINE

## 2020-06-01 ASSESSMENT — ENCOUNTER SYMPTOMS
BRUISES/BLEEDS EASILY: 0
WHEEZING: 0
COUGH: 0
FEVER: 0
PALPITATIONS: 1
BLURRED VISION: 0
DEPRESSION: 0
NAUSEA: 0
EYE PAIN: 0
LOSS OF CONSCIOUSNESS: 0
ABDOMINAL PAIN: 0
HEMOPTYSIS: 0
NERVOUS/ANXIOUS: 0
EYE DISCHARGE: 0
VOMITING: 0
CHILLS: 0
SPEECH CHANGE: 0
MYALGIAS: 0

## 2020-06-01 ASSESSMENT — FIBROSIS 4 INDEX: FIB4 SCORE: 0.75

## 2020-06-01 NOTE — LETTER
[facility]   [address], [address]  [place] [address]  Phone: [phone]  Fax: [phone]              Zehra Adam  1965    Encounter Date: 6/1/2020    Francis Yates M.D.          PROGRESS NOTE:  Telemedicine Visit: Established Patient     This encounter was conducted via Doximity.   Verbal consent was obtained. Patient's identity was verified.    Subjective:   CC: Paroxysmal atrial fibrillation  Zehra Adam is a 55 y.o. female presenting for evaluation and management of: Being seen in consult request of Dr. Hansen secondary to paroxysmal atrial fibrillation  History of some short palpitations in the past.  She has episodes of atrial fibrillation longest lasting 3 to 4 hours.  Recent episode on 5/26/2020 while on low-dose beta-blockers.  Beta-blocker dosing was increased.  No side effects.  Family history of heart disease.  Mother with atrial fibrillation.  Nobody with atrial fibrillation young age.  Minimal alcohol use.  Previously just 1 to 2 cups of caffeinated beverage per day.  Has not been tested for sleep apnea.  Patient's chads vasc score is 1.  Overall healthy.  Able to exercise.  On a baby aspirin.    Review of Systems   Constitutional: Negative for chills and fever.   HENT: Negative for congestion.    Eyes: Negative for blurred vision, pain and discharge.   Respiratory: Negative for cough, hemoptysis and wheezing.    Cardiovascular: Positive for palpitations. Negative for chest pain.   Gastrointestinal: Negative for abdominal pain, nausea and vomiting.   Musculoskeletal: Negative for joint pain and myalgias.   Skin: Negative for itching and rash.   Neurological: Negative for speech change and loss of consciousness.   Endo/Heme/Allergies: Does not bruise/bleed easily.   Psychiatric/Behavioral: Negative for depression. The patient is not nervous/anxious.    All other systems reviewed and are negative.    Denies any  recent fevers or chills. No nausea or vomiting. No chest pains or shortness of breath.     Allergies   Allergen Reactions   • Codeine        Current medicines (including changes today)  Current Outpatient Medications   Medication Sig Dispense Refill   • metoprolol SR (TOPROL XL) 50 MG TABLET SR 24 HR Take 1 Tab by mouth every day. 90 Tab 3   • Multiple Vitamin (MULTI-VITAMIN PO) Take  by mouth.     • Glucosamine HCl (GLUCOSAMINE PO) Take  by mouth.     • VITAMIN D PO Take  by mouth.     • VITAMIN E PO Take  by mouth.     • TURMERIC PO Take  by mouth.     • aspirin EC (ECOTRIN) 81 MG Tablet Delayed Response Take 1 Tab by mouth every day. 90 Tab 3   • levothyroxine (SYNTHROID) 50 MCG Tab Take 50 mcg by mouth Every morning on an empty stomach.       No current facility-administered medications for this visit.        Patient Active Problem List    Diagnosis Date Noted   • Healthcare maintenance 02/04/2020   • Hyperlipidemia 02/04/2020   • Atrial fibrillation (HCC) 12/26/2019       History reviewed. No pertinent family history.    She  has a past medical history of Thyroid disease.  She  has no past surgical history on file.       Objective:   Wt 82.6 kg (182 lb) Comment: PT STATES  BMI 26.88 kg/m²   Vitals:          Assessment and Plan:   The following treatment plan was discussed:     1. Hyperlipidemia, unspecified hyperlipidemia type    2. Paroxysmal atrial fibrillation (HCC)  - REFERRAL TO SLEEP STUDIES  1.  Paroxysmal atrial fibrillation.  Low chads vasc score.  Continue aspirin.  Check sleep study.  Continue beta-blockers.  If recurrent episodes are too frequent consider flutter.  2.  Hyperlipidemia.        Follow-up: Return in about 2 months (around 8/1/2020).             Berny Meza D.O.  [address] [address]  VIA Facsimile: [phone]

## 2020-06-01 NOTE — PROGRESS NOTES
Telemedicine Visit: Established Patient     This encounter was conducted via bunkersofa.   Verbal consent was obtained. Patient's identity was verified.    Subjective:   CC: Paroxysmal atrial fibrillation  Zehra Adam is a 55 y.o. female presenting for evaluation and management of: Seen in consult request of Dr. Hansen secondary to paroxysmal atrial fibrillation  History of some short palpitations in the past.  She has episodes of atrial fibrillation longest lasting 3 to 4 hours. No revious h/o SVT. Recent episode on 5/26/2020 while on low-dose beta-blockers.  Beta-blocker dosing was increased.  No side effects.  Family history of heart disease.  Mother with atrial fibrillation.  Nobody with atrial fibrillation at young age.  Minimal alcohol use.  Previously just 1 to 2 cups of caffeinated beverage per day, none now  Has not been tested for sleep apnea.  Patient's chads vasc score is 1.  Overall healthy.  Able to exercise.  On a baby aspirin.    Review of Systems   Constitutional: Negative for chills and fever.   HENT: Negative for congestion.    Eyes: Negative for blurred vision, pain and discharge.   Respiratory: Negative for cough, hemoptysis and wheezing.    Cardiovascular: Positive for palpitations. Negative for chest pain.   Gastrointestinal: Negative for abdominal pain, nausea and vomiting.   Musculoskeletal: Negative for joint pain and myalgias.   Skin: Negative for itching and rash.   Neurological: Negative for speech change and loss of consciousness.   Endo/Heme/Allergies: Does not bruise/bleed easily.   Psychiatric/Behavioral: Negative for depression. The patient is not nervous/anxious.    All other systems reviewed and are negative.    Denies any recent fevers or chills. No nausea or vomiting. No chest pains or shortness of breath.     Allergies   Allergen Reactions   • Codeine        Current medicines (including changes today)  Current Outpatient Medications   Medication Sig Dispense  Refill   • metoprolol SR (TOPROL XL) 50 MG TABLET SR 24 HR Take 1 Tab by mouth every day. 90 Tab 3   • Multiple Vitamin (MULTI-VITAMIN PO) Take  by mouth.     • Glucosamine HCl (GLUCOSAMINE PO) Take  by mouth.     • VITAMIN D PO Take  by mouth.     • VITAMIN E PO Take  by mouth.     • TURMERIC PO Take  by mouth.     • aspirin EC (ECOTRIN) 81 MG Tablet Delayed Response Take 1 Tab by mouth every day. 90 Tab 3   • levothyroxine (SYNTHROID) 50 MCG Tab Take 50 mcg by mouth Every morning on an empty stomach.       No current facility-administered medications for this visit.        Patient Active Problem List    Diagnosis Date Noted   • Healthcare maintenance 02/04/2020   • Hyperlipidemia 02/04/2020   • Atrial fibrillation (HCC) 12/26/2019       History reviewed. No pertinent family history.    She  has a past medical history of Thyroid disease.  She  has no past surgical history on file.       Objective:   Wt 82.6 kg (182 lb) Comment: PT STATES  BMI 26.88 kg/m²   Vitals:          Assessment and Plan:   The following treatment plan was discussed:     1. Hyperlipidemia, unspecified hyperlipidemia type    2. Paroxysmal atrial fibrillation (HCC)  - REFERRAL TO SLEEP STUDIES  1.  Paroxysmal atrial fibrillation.  Low chads vasc score.  Continue aspirin.  Check sleep study.  Continue beta-blockers.  If recurrent episodes are too frequent consider Tambocor.  2.  Hyperlipidemia.        Follow-up: Return in about 2 months (around 8/1/2020).

## 2020-09-21 ENCOUNTER — TELEMEDICINE (OUTPATIENT)
Dept: SLEEP MEDICINE | Facility: MEDICAL CENTER | Age: 55
End: 2020-09-21
Payer: COMMERCIAL

## 2020-09-21 VITALS — WEIGHT: 180 LBS | HEIGHT: 69 IN | BODY MASS INDEX: 26.66 KG/M2

## 2020-09-21 DIAGNOSIS — G47.10 HYPERSOMNOLENCE: ICD-10-CM

## 2020-09-21 DIAGNOSIS — I48.0 PAROXYSMAL ATRIAL FIBRILLATION (HCC): ICD-10-CM

## 2020-09-21 DIAGNOSIS — G47.30 SLEEP APNEA, UNSPECIFIED TYPE: ICD-10-CM

## 2020-09-21 DIAGNOSIS — F51.04 CHRONIC INSOMNIA: ICD-10-CM

## 2020-09-21 PROCEDURE — 99244 OFF/OP CNSLTJ NEW/EST MOD 40: CPT | Performed by: INTERNAL MEDICINE

## 2020-09-21 RX ORDER — ZOLPIDEM TARTRATE 5 MG/1
TABLET ORAL
Qty: 2 TAB | Refills: 0 | Status: SHIPPED | OUTPATIENT
Start: 2020-09-21 | End: 2020-10-21

## 2020-09-21 ASSESSMENT — FIBROSIS 4 INDEX: FIB4 SCORE: 0.75

## 2020-09-21 NOTE — PROGRESS NOTES
CC: Snoring, nocturnal apnea and excessive daytime somnolence, suspected sleep apnea hypopnea syndrome.    HPI:   Ms Adam is a 55-year-old woman referred by Dr. Francis Yates to assist in the evaluation and management of suspected sleep disordered breathing. This evaluation was conducted via telemedicine using secure encrypted software on the V[employer] platform.  The patient was physically located in her home and the physician was located in the [facility] in [facility]. The patient's identity was confirmed and verbal consent for the telemedicine interview was obtained.    She was evaluated by cardiology for palpitations and found to have paroxysmal atrial fibrillation.  Course of her evaluation symptoms suggesting sleep disordered breathing were identified.    She has a regular sleep schedule with bedtime at about 10 PM and a variable sleep onset latency.  States that she awakens 5-6 times on an average night.  She has been told that she does snore and has episodes of apparent apnea when she is lying on her back.  She arises between 5 and 6 in the morning feeling tired without overt grogginess or regular morning headaches.  She is tired during the day and may doze off during quiet moments.  She has not yet completed the [place] sleepiness score.  Her weight has been stable this year.  She has not previously been evaluated for sleep issues.  She does not have symptoms suggesting narcolepsy, parasomnia or restless leg syndrome.  She does not have a known family history of sleep disordered breathing.        Patient Active Problem List    Diagnosis Date Noted   • Healthcare maintenance 02/04/2020   • Hyperlipidemia 02/04/2020   • Atrial fibrillation (HCC) 12/26/2019       Past Medical History:   Diagnosis Date   • Atrial fibrillation (HCC)    • Hypothyroidism    • Thyroid disease        History reviewed. No pertinent surgical history.    Family History   Problem Relation Age of Onset   • Thyroid Mother    • Heart  Disease Mother    • Diabetes Father    • Thyroid Sister    • Heart Disease Brother    • Sleep Apnea Brother        Social History     Socioeconomic History   • Marital status:      Spouse name: Not on file   • Number of children: Not on file   • Years of education: Not on file   • Highest education level: Not on file   Occupational History   • Not on file   Social Needs   • Financial resource strain: Not on file   • Food insecurity     Worry: Not on file     Inability: Not on file   • Transportation needs     Medical: Not on file     Non-medical: Not on file   Tobacco Use   • Smoking status: Never Smoker   • Smokeless tobacco: Never Used   Substance and Sexual Activity   • Alcohol use: Yes     Alcohol/week: 0.6 oz     Types: 1 Glasses of wine per week     Comment: occasional 2 drinks   • Drug use: No   • Sexual activity: Not on file   Lifestyle   • Physical activity     Days per week: Not on file     Minutes per session: Not on file   • Stress: Not on file   Relationships   • Social connections     Talks on phone: Not on file     Gets together: Not on file     Attends Yazdanism service: Not on file     Active member of club or organization: Not on file     Attends meetings of clubs or organizations: Not on file     Relationship status: Not on file   • Intimate partner violence     Fear of current or ex partner: Not on file     Emotionally abused: Not on file     Physically abused: Not on file     Forced sexual activity: Not on file   Other Topics Concern   • Not on file   Social History Narrative    ** Merged History Encounter **            Current Outpatient Medications   Medication Sig Dispense Refill   • zolpidem (AMBIEN) 5 MG Tab Take 1-2 tablets by mouth every evening as needed for insomnia. Bring to sleep study. 2 Tab 0   • metoprolol SR (TOPROL XL) 50 MG TABLET SR 24 HR Take 1 Tab by mouth every day. 90 Tab 3   • Multiple Vitamin (MULTI-VITAMIN PO) Take  by mouth.     • Glucosamine HCl (GLUCOSAMINE PO)  "Take  by mouth.     • VITAMIN D PO Take  by mouth.     • VITAMIN E PO Take  by mouth.     • TURMERIC PO Take  by mouth.     • aspirin EC (ECOTRIN) 81 MG Tablet Delayed Response Take 1 Tab by mouth every day. 90 Tab 3   • levothyroxine (SYNTHROID) 50 MCG Tab Take 50 mcg by mouth Every morning on an empty stomach.       No current facility-administered medications for this visit.     \"CURRENT RX\"      Allergies: Codeine      ROS  For the cardiac and sleep issues reviewed above.  All other aspects of the CPT review of systems process are negative.      Physical Exam:   Ht 1.753 m (5' 9\")   Wt 81.6 kg (180 lb)   BMI 26.58 kg/m²    Limited by the telemedicine interface.  She is a well-developed, well-nourished woman who is alert, oriented and appropriately responsive.  She is not dyspneic and is not coughing.      Problems:  1. Sleep apnea, unspecified type  She presents with snoring, nocturnal apnea and excessive daytime somnolence as well as a history of paroxysmal atrial fibrillation.  The clinical probability of sleep apnea hypopnea syndrome is significant. Accurate diagnosis and effective treatment are required not only to improve levels of daytime alertness but also to reduce the cardiac and neurologic risks associated with untreated sleep apnea. We have discussed diagnostic options including in-laboratory, attended polysomnography and home sleep testing. We have also discussed treatment options including airway pressurization, reconstructive otolaryngologic surgery, dental appliances and weight management.    2. Hypersomnolence  Probably related to the suspected sleep disordered breathing.  She is spending sufficient nightly time in bed but has frequent nocturnal awakenings that may restrict nightly sleep time.    3. Chronic insomnia  This issue will be reevaluated when her presumed sleep disordered breathing has been assessed and effectively treated.    4. Paroxysmal atrial fibrillation (HCC)  Controlled and " monitored by cardiology.      Plan:   1.  Polysomnogram, possible split-night study.    2.  Return visit after testing to review results and treatment options.    We appreciate the opportunity to assist in her care.      Total time for the visit today was 45 minutes.  More than half of that time was devoted to counseling the patient concerning the mechanics of sleep disordered breathing, the benefits of treatment in  improving daytime sleepiness and reducing the risk of future cardiac and neurologic events and in discussing treatment options.

## 2020-10-10 ENCOUNTER — SLEEP STUDY (OUTPATIENT)
Dept: SLEEP MEDICINE | Facility: MEDICAL CENTER | Age: 55
End: 2020-10-10
Attending: INTERNAL MEDICINE
Payer: COMMERCIAL

## 2020-10-10 DIAGNOSIS — G47.30 SLEEP APNEA, UNSPECIFIED TYPE: ICD-10-CM

## 2020-10-13 PROCEDURE — 95810 POLYSOM 6/> YRS 4/> PARAM: CPT | Performed by: FAMILY MEDICINE

## 2020-10-13 NOTE — PROCEDURES
Technical summary:The patient underwent a diagnostic polysomnogram. This was a 16 channel montage study to include a 6 channel EEG, a 2 channel EOG, and chin EMG, left and right leg EMG, a snore channel, a nasal pressure transducer, and a nasal oral airflow thermistor.   Respiratory effort was assessed with the use of a thoracic and abdominal monitor and overnight oximetry was obtained. Audio and video recordings were reviewed. This was a fully attended study and sleep stage scoring was performed. The test was technically adequate.       Scoring Criteria: A modification of the the AASM Manual for the Scoring of Sleep and Associated Events. Obstructive apnea was scored by cessation of airflow for at least 10 seconds with continuing respiratory effort.  Central apnea was scored by cessation of airflow for at least 10 seconds with no effort.  Hypopnea was scored by a 30% or more reduction in airflow for at least 10 seconds accompanied by an arterial oxygen desaturation of 3% or more.  (For Medicare patients, hypopneas were scored by a 30% or more reduction in airflow for at least 10 seconds accompanied by an arterial oxygen saturation of 4% or more, as required by their insurance, CMS.    Interpretation:  Study start time was 10:16:38 PM. Diagnostic recording time was 6h 54.5m with a total sleep time of 6h 22.5m resulting in a sleep efficiency of 92.28%. Sleep latency from the start of the study was 08 minutes and the latency from sleep to REM was 97 minutes.In total,110 arousals were scored for an arousal index of 17.3.    Respiratory:  There were a total of 11 apneas consisting of 11 obstructive apneas, 0 mixed apneas, and 0 central apneas. A total of 70 hypopneas were scored.The apnea index was 1.73 per hour and the hypopnea index was 10.98 per hour resulting in an overall AHI of 12.71.AHI during rem was 34.6 and AHI while supine was 18.88.    Oximetry:  There was a mean oxygen saturation of 91.0% with a minimum  oxygen saturation of 80.0%. Time spent with oxygen saturations below 89% was 71.1 minutes.    Cardiac:  The highest heart rate seen while awake was 84 BPM while the highest heart rate during sleep was 83 BPM with an average sleeping heart rate of 60 BPM.    Limb Movements:  There were a total of 107 PLMs during sleep, of which 11 were PLMS arousals. This resulted in a PLMS index of 16.8 and a PLMS arousal index of 1.7.    Impression:  1.  Mild OAS with AHI of 12.7/hr and O2 lise 85 %. However the supine AHI was 18.9/hr  2.  Sleep related hypoxia      Recommendations:  Recommend the patient return for a CPAP titration. In some cases alternative treatment options may prove effective in resolving sleep apnea and these options include upper airway surgery, the use of a dental orthotic or weight loss and positional therapy. Clinical correlation is required. In general patients with sleep apnea are advised to avoid alcohol and sedatives and to not operate a motor vehicle while drowsy and are at a greater risk for cardiovascular disease.

## 2020-11-18 ENCOUNTER — TELEPHONE (OUTPATIENT)
Dept: CARDIOLOGY | Facility: MEDICAL CENTER | Age: 55
End: 2020-11-18

## 2020-11-18 ENCOUNTER — PATIENT MESSAGE (OUTPATIENT)
Dept: CARDIOLOGY | Facility: MEDICAL CENTER | Age: 55
End: 2020-11-18

## 2020-11-18 NOTE — PATIENT COMMUNICATION
Pt tested + for covid 11/5 and  11/6. Current protocol cannot be seen in office for 21 days. Per DS last note pt needs flecainide and now NOAC [place] with continued afib. Pty refusing ER with afib, low BP and sx. They request DS start meds over phone. ER rec. To DS

## 2020-11-24 ENCOUNTER — TELEMEDICINE (OUTPATIENT)
Dept: CARDIOLOGY | Facility: MEDICAL CENTER | Age: 55
End: 2020-11-24
Payer: COMMERCIAL

## 2020-11-24 VITALS
BODY MASS INDEX: 26.07 KG/M2 | SYSTOLIC BLOOD PRESSURE: 124 MMHG | HEIGHT: 69 IN | WEIGHT: 176 LBS | DIASTOLIC BLOOD PRESSURE: 74 MMHG | OXYGEN SATURATION: 95 % | HEART RATE: 58 BPM

## 2020-11-24 DIAGNOSIS — G47.33 OBSTRUCTIVE SLEEP APNEA SYNDROME: ICD-10-CM

## 2020-11-24 PROCEDURE — 99213 OFFICE O/P EST LOW 20 MIN: CPT | Mod: 95,CR | Performed by: INTERNAL MEDICINE

## 2020-11-24 RX ORDER — FLECAINIDE ACETATE 50 MG/1
50 TABLET ORAL 2 TIMES DAILY
Qty: 60 TAB | Refills: 3 | Status: SHIPPED | OUTPATIENT
Start: 2020-11-24 | End: 2021-03-17

## 2020-11-24 RX ORDER — METOPROLOL SUCCINATE 50 MG/1
25 TABLET, EXTENDED RELEASE ORAL DAILY
Qty: 90 TAB | Refills: 3
Start: 2020-11-24 | End: 2021-04-27

## 2020-11-24 ASSESSMENT — FIBROSIS 4 INDEX: FIB4 SCORE: 0.75

## 2020-11-25 NOTE — PROGRESS NOTES
Virtual Visit: Established Patient   This visit was conducted via Zoom using secure and encrypted videoconferencing technology. The patient was in a private location in the state of [place].    The patient's identity was confirmed and verbal consent was obtained for this virtual visit.    Subjective:   CC:   Chief Complaint   Patient presents with   • Atrial Fibrillation       Zehra Adam is a 55 y.o. female presenting for evaluation and management of:    Paroxysmal atrial fibrillation.  Vagally mediated.  Recurrence despite Toprol.  Last episode lasting couple hours.  Mild dizziness with last episode.  Mild to moderate sleep apnea.        Allergies   Allergen Reactions   • Codeine        Current medicines (including changes today)  Current Outpatient Medications   Medication Sig Dispense Refill   • flecainide (TAMBOCOR) 50 MG tablet Take 1 Tab by mouth 2 times a day. 60 Tab 3   • metoprolol SR (TOPROL XL) 50 MG TABLET SR 24 HR Take 0.5 Tabs by mouth every day. 90 Tab 3   • Multiple Vitamin (MULTI-VITAMIN PO) Take  by mouth.     • Glucosamine HCl (GLUCOSAMINE PO) Take  by mouth.     • VITAMIN D PO Take  by mouth.     • VITAMIN E PO Take  by mouth.     • TURMERIC PO Take  by mouth.     • aspirin EC (ECOTRIN) 81 MG Tablet Delayed Response Take 1 Tab by mouth every day. 90 Tab 3   • levothyroxine (SYNTHROID) 50 MCG Tab Take 50 mcg by mouth Every morning on an empty stomach.       No current facility-administered medications for this visit.        Patient Active Problem List    Diagnosis Date Noted   • Obstructive sleep apnea syndrome 11/24/2020   • Healthcare maintenance 02/04/2020   • Hyperlipidemia 02/04/2020   • Atrial fibrillation (HCC) 12/26/2019       Family History   Problem Relation Age of Onset   • Thyroid Mother    • Heart Disease Mother    • Diabetes Father    • Thyroid Sister    • Heart Disease Brother    • Sleep Apnea Brother        She  has a past medical history of Atrial fibrillation  "(HCC), Hypothyroidism, and Thyroid disease.  She  has no past surgical history on file.       Objective:   /74 (BP Location: Left arm, Patient Position: Sitting, BP Cuff Size: Adult)   Pulse (!) 58   Ht 1.753 m (5' 9\")   Wt 79.8 kg (176 lb)   SpO2 95%   BMI 25.99 kg/m²     Physical Exam:  Well-appearing      Assessment and Plan:   The following treatment plan was discussed:     1. Obstructive sleep apnea syndrome    Other orders  - flecainide (TAMBOCOR) 50 MG tablet; Take 1 Tab by mouth 2 times a day.  Dispense: 60 Tab; Refill: 3  - metoprolol SR (TOPROL XL) 50 MG TABLET SR 24 HR; Take 0.5 Tabs by mouth every day.  Dispense: 90 Tab; Refill: 3  1.  Paroxysmal atrial fibrillation.  Start flecainide.  Titrate beta-blockers.  2.  Sleep apnea.  Consideration of CPAP.      Follow-up: Return in about 2 months (around 1/24/2021).         "

## 2020-12-16 ENCOUNTER — TELEMEDICINE (OUTPATIENT)
Dept: SLEEP MEDICINE | Facility: MEDICAL CENTER | Age: 55
End: 2020-12-16
Payer: COMMERCIAL

## 2020-12-16 VITALS
HEIGHT: 69 IN | HEART RATE: 70 BPM | OXYGEN SATURATION: 96 % | SYSTOLIC BLOOD PRESSURE: 100 MMHG | DIASTOLIC BLOOD PRESSURE: 58 MMHG | WEIGHT: 180 LBS | BODY MASS INDEX: 26.66 KG/M2

## 2020-12-16 DIAGNOSIS — I48.0 PAROXYSMAL ATRIAL FIBRILLATION (HCC): ICD-10-CM

## 2020-12-16 DIAGNOSIS — F51.04 CHRONIC INSOMNIA: ICD-10-CM

## 2020-12-16 DIAGNOSIS — G47.33 OBSTRUCTIVE SLEEP APNEA SYNDROME: ICD-10-CM

## 2020-12-16 PROCEDURE — 99213 OFFICE O/P EST LOW 20 MIN: CPT | Mod: 95,CR | Performed by: NURSE PRACTITIONER

## 2020-12-16 ASSESSMENT — FIBROSIS 4 INDEX: FIB4 SCORE: 0.75

## 2020-12-16 NOTE — PROGRESS NOTES
Virtual Visit: Established Patient   This visit was conducted via Zoom using secure and encrypted videoconferencing technology. The patient was in a private location in the state [facility].    The patient's identity was confirmed and verbal consent was obtained for this virtual visit.  Given the importance of social distancing and other strategies recommended to reduce the risk of COVID-19 transmission, I am providing medical care to this patient via audio/video visit in place of an in person visit at the request of the patient.  Subjective:   CC:   Chief Complaint   Patient presents with   • Follow-Up     Apnea-Last seen 09/21/2020   • Results     Sleep Study 10/10/2020       Zehra Adam is a 55 y.o. female presenting for evaluation and management of CHRIS and sleep study results. PMH includes A. fib on aspirin and flecainide, hyperlipidemia, chronic insomnia, COVID-19 positive on November 5, 2020.    PSG from 10/10/20 indicated mild OAS with AHI of 12.7/hr and O2 lise 85 %. However the supine AHI was 18.9/hr. AHI during rem was 34.6 and AHI while supine was 18.88. Sleep related hypoxia. There was a mean oxygen saturation of 91.0% with a minimum oxygen saturation of 80.0%. Time spent with oxygen saturations below 89% was 71.1 minutes.    Patient is accompanied by her  today.  She goes to bed at 10 pm and wakes up between 5:30-6 am.  She wakes up frequently at least 4-6 times a night which she attributes to sleeping next to her , their cat sleepin in the bed and nocturia.  She feels tired every day all the time but does not take naps.  She follows up with Dr. Yates for A. fib management.  She was diagnosed with COVID-19 on November 5, 2020.  She was going to spin classes 3-4 times a week before she was diagnosed with COVID-19 and now has not been as active.  She denies any new health problems or medications.      ROS   Denies any recent fevers or chills. No nausea or vomiting. No chest  "pains or shortness of breath.     Allergies   Allergen Reactions   • Codeine        Current medicines (including changes today)  Current Outpatient Medications   Medication Sig Dispense Refill   • flecainide (TAMBOCOR) 50 MG tablet Take 1 Tab by mouth 2 times a day. 60 Tab 3   • Multiple Vitamin (MULTI-VITAMIN PO) Take  by mouth.     • Glucosamine HCl (GLUCOSAMINE PO) Take  by mouth.     • VITAMIN D PO Take  by mouth.     • VITAMIN E PO Take  by mouth.     • TURMERIC PO Take  by mouth.     • aspirin EC (ECOTRIN) 81 MG Tablet Delayed Response Take 1 Tab by mouth every day. 90 Tab 3   • levothyroxine (SYNTHROID) 50 MCG Tab Take 50 mcg by mouth Every morning on an empty stomach.     • metoprolol SR (TOPROL XL) 50 MG TABLET SR 24 HR Take 0.5 Tabs by mouth every day. (Patient not taking: Reported on 12/16/2020) 90 Tab 3     No current facility-administered medications for this visit.        Patient Active Problem List    Diagnosis Date Noted   • Obstructive sleep apnea syndrome 11/24/2020   • Healthcare maintenance 02/04/2020   • Hyperlipidemia 02/04/2020   • Atrial fibrillation (HCC) 12/26/2019       Family History   Problem Relation Age of Onset   • Thyroid Mother    • Heart Disease Mother    • Diabetes Father    • Thyroid Sister    • Heart Disease Brother    • Sleep Apnea Brother        She  has a past medical history of Atrial fibrillation (HCC), Hypothyroidism, and Thyroid disease.  She  has no past surgical history on file.       Objective:   /58 (BP Location: Left arm)   Pulse 70   Ht 1.753 m (5' 9\")   Wt 81.6 kg (180 lb)   SpO2 96%   BMI 26.58 kg/m²     Physical Exam:  Constitutional: Alert, no distress, well-groomed.  Skin: No rashes in visible areas.  Eye: Round. Conjunctiva clear, lids normal. No icterus.   ENMT: Lips pink without lesions, good dentition, moist mucous membranes. Phonation normal.  Neck: Moves freely without pain.  Respiratory: Unlabored respiratory effort, no cough or audible " wheeze  Psych: Alert and oriented x3, normal affect and mood.       Assessment and Plan:   The following treatment plan was discussed:     1. Obstructive sleep apnea syndrome  - DME CPAP    2. Chronic insomnia    3. Paroxysmal atrial fibrillation (HCC)    4. BMI 26.0-26.9,adult      Discussed the cardiovascular and neuropsychiatric risks of untreated CHRIS; including but not limited to: HTN, DM, MI, ASCVD, CVA, CHF, traffic accidents.     1. PSG from 10/10/20 indicated mild OAS with AHI of 12.7/hr and O2 lise 85 %. However the supine AHI was 18.9/hr. AHI during rem was 34.6 and AHI while supine was 18.88. Sleep related hypoxia. There was a mean oxygen saturation of 91.0% with a minimum oxygen saturation of 80.0%. Time spent with oxygen saturations below 89% was 71.1 minutes.  Recommendation:  Recommend the patient return for a CPAP titration. In some cases alternative treatment options may prove effective in resolving sleep apnea and these options include upper airway surgery, the use of a dental orthotic or weight loss and positional therapy.  Advised patient to avoid the supine position, alcohol consumption prior to bedtime, sleep aids and opioid pain medications as all can make apnea worse.    2. Patient is amenable to autoCPAP trial. DME order ([facility]) for autoCPAP 5-15 cmH2O, mask (MOC) and supplies was provided today. Clean mask and supplies weekly, and change supplies per insurance guidelines.  Patient was advised to use the CPAP every night for more than 4 hours for optimal health benefit and to meet the health insurance 70% compliance guideline.  3. Order OPO on autoCPAP at next office visit if AHI is in the normal range.   4. Continue to stay active.   5. Follow up with the appropriate healthcare practitioners for all other medical problems and issues.  6. Sleep hygiene discussed.  Advised patient to keep pets out of the bed.  7. Continue to f/u with cardiology, Dr. Yates, for Afib management, on ASA 81 mg and  flecainide 50 mg.       Follow-up: Return in about 3 months (around 3/16/2021).    [employer].    This dictation was created using voice recognition software. The accuracy of the dictation is limited to the abilities of the software. I expect there may be some errors of grammar and possibly content.

## 2020-12-31 ENCOUNTER — HOSPITAL ENCOUNTER (OUTPATIENT)
Dept: LAB | Facility: MEDICAL CENTER | Age: 55
End: 2020-12-31
Attending: FAMILY MEDICINE
Payer: COMMERCIAL

## 2020-12-31 LAB
25(OH)D3 SERPL-MCNC: 37 NG/ML (ref 30–100)
ALBUMIN SERPL BCP-MCNC: 4.6 G/DL (ref 3.2–4.9)
ALBUMIN/GLOB SERPL: 1.7 G/DL
ALP SERPL-CCNC: 88 U/L (ref 30–99)
ALT SERPL-CCNC: 55 U/L (ref 2–50)
ANION GAP SERPL CALC-SCNC: 10 MMOL/L (ref 7–16)
AST SERPL-CCNC: 36 U/L (ref 12–45)
BASOPHILS # BLD AUTO: 1.2 % (ref 0–1.8)
BASOPHILS # BLD: 0.06 K/UL (ref 0–0.12)
BILIRUB SERPL-MCNC: 0.6 MG/DL (ref 0.1–1.5)
BUN SERPL-MCNC: 12 MG/DL (ref 8–22)
CALCIUM SERPL-MCNC: 9.6 MG/DL (ref 8.5–10.5)
CHLORIDE SERPL-SCNC: 102 MMOL/L (ref 96–112)
CHOLEST SERPL-MCNC: 258 MG/DL (ref 100–199)
CO2 SERPL-SCNC: 26 MMOL/L (ref 20–33)
CREAT SERPL-MCNC: 0.65 MG/DL (ref 0.5–1.4)
EOSINOPHIL # BLD AUTO: 0.08 K/UL (ref 0–0.51)
EOSINOPHIL NFR BLD: 1.6 % (ref 0–6.9)
ERYTHROCYTE [DISTWIDTH] IN BLOOD BY AUTOMATED COUNT: 44.9 FL (ref 35.9–50)
FASTING STATUS PATIENT QL REPORTED: NORMAL
GLOBULIN SER CALC-MCNC: 2.7 G/DL (ref 1.9–3.5)
GLUCOSE SERPL-MCNC: 79 MG/DL (ref 65–99)
HCT VFR BLD AUTO: 43.4 % (ref 37–47)
HDLC SERPL-MCNC: 70 MG/DL
HGB BLD-MCNC: 14.1 G/DL (ref 12–16)
IMM GRANULOCYTES # BLD AUTO: 0.01 K/UL (ref 0–0.11)
IMM GRANULOCYTES NFR BLD AUTO: 0.2 % (ref 0–0.9)
LDLC SERPL CALC-MCNC: 176 MG/DL
LYMPHOCYTES # BLD AUTO: 1.68 K/UL (ref 1–4.8)
LYMPHOCYTES NFR BLD: 32.6 % (ref 22–41)
MCH RBC QN AUTO: 30.3 PG (ref 27–33)
MCHC RBC AUTO-ENTMCNC: 32.5 G/DL (ref 33.6–35)
MCV RBC AUTO: 93.3 FL (ref 81.4–97.8)
MONOCYTES # BLD AUTO: 0.59 K/UL (ref 0–0.85)
MONOCYTES NFR BLD AUTO: 11.4 % (ref 0–13.4)
NEUTROPHILS # BLD AUTO: 2.74 K/UL (ref 2–7.15)
NEUTROPHILS NFR BLD: 53 % (ref 44–72)
NRBC # BLD AUTO: 0 K/UL
NRBC BLD-RTO: 0 /100 WBC
PLATELET # BLD AUTO: 290 K/UL (ref 164–446)
PMV BLD AUTO: 10.6 FL (ref 9–12.9)
POTASSIUM SERPL-SCNC: 3.9 MMOL/L (ref 3.6–5.5)
PROT SERPL-MCNC: 7.3 G/DL (ref 6–8.2)
RBC # BLD AUTO: 4.65 M/UL (ref 4.2–5.4)
SODIUM SERPL-SCNC: 138 MMOL/L (ref 135–145)
T3FREE SERPL-MCNC: 2.85 PG/ML (ref 2–4.4)
T4 FREE SERPL-MCNC: 1.14 NG/DL (ref 0.93–1.7)
TRIGL SERPL-MCNC: 62 MG/DL (ref 0–149)
TSH SERPL DL<=0.005 MIU/L-ACNC: 4.63 UIU/ML (ref 0.38–5.33)
WBC # BLD AUTO: 5.2 K/UL (ref 4.8–10.8)

## 2020-12-31 PROCEDURE — 82306 VITAMIN D 25 HYDROXY: CPT

## 2020-12-31 PROCEDURE — 84481 FREE ASSAY (FT-3): CPT

## 2020-12-31 PROCEDURE — 85025 COMPLETE CBC W/AUTO DIFF WBC: CPT

## 2020-12-31 PROCEDURE — 80053 COMPREHEN METABOLIC PANEL: CPT

## 2020-12-31 PROCEDURE — 84443 ASSAY THYROID STIM HORMONE: CPT

## 2020-12-31 PROCEDURE — 80061 LIPID PANEL: CPT

## 2020-12-31 PROCEDURE — 84439 ASSAY OF FREE THYROXINE: CPT

## 2020-12-31 PROCEDURE — 36415 COLL VENOUS BLD VENIPUNCTURE: CPT

## 2021-01-11 ENCOUNTER — PATIENT MESSAGE (OUTPATIENT)
Dept: SLEEP MEDICINE | Facility: MEDICAL CENTER | Age: 56
End: 2021-01-11

## 2021-02-05 ENCOUNTER — HOSPITAL ENCOUNTER (OUTPATIENT)
Dept: RADIOLOGY | Facility: MEDICAL CENTER | Age: 56
End: 2021-02-05
Attending: FAMILY MEDICINE
Payer: COMMERCIAL

## 2021-02-05 DIAGNOSIS — Z12.31 VISIT FOR SCREENING MAMMOGRAM: ICD-10-CM

## 2021-02-05 PROCEDURE — 77063 BREAST TOMOSYNTHESIS BI: CPT

## 2021-02-09 ENCOUNTER — TELEPHONE (OUTPATIENT)
Dept: SLEEP MEDICINE | Facility: MEDICAL CENTER | Age: 56
End: 2021-02-09

## 2021-02-09 NOTE — TELEPHONE ENCOUNTER
1. Caller Name: Zehra Adam                        Call Back Number: [phone]  Pt LM stating that she was seen in December by John Duque and had an rx for CPAP. She has not heard from [facility], and called today for the second time to follow up. [facility] told pt that they had not received the orders. I re-faxed the orders with a note asking them to expedite, as the patient has been waiting since December.

## 2021-02-25 ENCOUNTER — PATIENT MESSAGE (OUTPATIENT)
Dept: SLEEP MEDICINE | Facility: MEDICAL CENTER | Age: 56
End: 2021-02-25

## 2021-03-15 DIAGNOSIS — Z23 NEED FOR VACCINATION: ICD-10-CM

## 2021-03-23 ENCOUNTER — IMMUNIZATION (OUTPATIENT)
Dept: FAMILY PLANNING/WOMEN'S HEALTH CLINIC | Facility: IMMUNIZATION CENTER | Age: 56
End: 2021-03-23
Attending: INTERNAL MEDICINE
Payer: COMMERCIAL

## 2021-03-23 DIAGNOSIS — Z23 ENCOUNTER FOR VACCINATION: Primary | ICD-10-CM

## 2021-03-23 DIAGNOSIS — Z23 NEED FOR VACCINATION: ICD-10-CM

## 2021-03-23 PROCEDURE — 0001A PFIZER SARS-COV-2 VACCINE: CPT

## 2021-03-23 PROCEDURE — 91300 PFIZER SARS-COV-2 VACCINE: CPT

## 2021-04-15 ENCOUNTER — IMMUNIZATION (OUTPATIENT)
Dept: FAMILY PLANNING/WOMEN'S HEALTH CLINIC | Facility: IMMUNIZATION CENTER | Age: 56
End: 2021-04-15
Attending: INTERNAL MEDICINE
Payer: COMMERCIAL

## 2021-04-15 DIAGNOSIS — Z23 ENCOUNTER FOR VACCINATION: Primary | ICD-10-CM

## 2021-04-15 PROCEDURE — 0002A PFIZER SARS-COV-2 VACCINE: CPT

## 2021-04-15 PROCEDURE — 91300 PFIZER SARS-COV-2 VACCINE: CPT

## 2021-04-27 ENCOUNTER — TELEMEDICINE (OUTPATIENT)
Dept: CARDIOLOGY | Facility: MEDICAL CENTER | Age: 56
End: 2021-04-27
Payer: COMMERCIAL

## 2021-04-27 ENCOUNTER — PATIENT MESSAGE (OUTPATIENT)
Dept: CARDIOLOGY | Facility: MEDICAL CENTER | Age: 56
End: 2021-04-27

## 2021-04-27 VITALS
WEIGHT: 162.4 LBS | OXYGEN SATURATION: 96 % | DIASTOLIC BLOOD PRESSURE: 59 MMHG | SYSTOLIC BLOOD PRESSURE: 106 MMHG | BODY MASS INDEX: 24.05 KG/M2 | HEIGHT: 69 IN | HEART RATE: 60 BPM

## 2021-04-27 DIAGNOSIS — I48.0 PAROXYSMAL ATRIAL FIBRILLATION (HCC): ICD-10-CM

## 2021-04-27 DIAGNOSIS — E02 SUBCLINICAL IODINE-DEFICIENCY HYPOTHYROIDISM: ICD-10-CM

## 2021-04-27 DIAGNOSIS — G47.33 OBSTRUCTIVE SLEEP APNEA SYNDROME: ICD-10-CM

## 2021-04-27 DIAGNOSIS — E78.2 MIXED HYPERLIPIDEMIA: ICD-10-CM

## 2021-04-27 PROCEDURE — 99213 OFFICE O/P EST LOW 20 MIN: CPT | Mod: 95,CR | Performed by: INTERNAL MEDICINE

## 2021-04-27 RX ORDER — LEVOTHYROXINE SODIUM 0.07 MG/1
75 TABLET ORAL
COMMUNITY
Start: 2021-03-24

## 2021-04-27 ASSESSMENT — FIBROSIS 4 INDEX: FIB4 SCORE: 0.97

## 2021-04-27 NOTE — PROGRESS NOTES
Telemedicine Video Visit: Established Patient   This Remote Face to Face encounter was conducted via Zoom. Given the importance of social distancing and other strategies recommended to reduce the risk of COVID-19 transmission, I am providing medical care to this patient via audio/video visit in place of an in person visit at the request of the patient. Verbal consent to telehealth, risks, benefits, and consequences were discussed. Patient retains the right to withdraw at any time. All existing confidentiality protections apply. The patient has access to all transmitted medical information. No dissemination of any patient images or information to other entities without further written consent.  Subjective:     Chief Complaint   Patient presents with   • Atrial Fibrillation       Zehra Adam is a 56 y.o. female presenting for evaluation and management of:    paroxysmal atrial fibrillation.  Chads vasc score 1.  Good control on low-dose flecainide.  Feels well.  Hyperlipidemia with LDL above 160.  Elevated HDL.  Just started CPAP treatment.  ROS   Denies any recent fevers or chills. No nausea or vomiting. No chest pains or shortness of breath.     Allergies   Allergen Reactions   • Codeine        Current medicines (including changes today)  Current Outpatient Medications   Medication Sig Dispense Refill   • levothyroxine (SYNTHROID) 75 MCG Tab TAKE 1 TABLET BY MOUTH EVERY DAY ON AN EMPTY STOMACH     • flecainide (TAMBOCOR) 50 MG tablet Take 1 tablet by mouth 2 times a day. 180 tablet 2   • Multiple Vitamin (MULTI-VITAMIN PO) Take  by mouth.     • Glucosamine HCl (GLUCOSAMINE PO) Take  by mouth.     • VITAMIN D PO Take  by mouth.     • VITAMIN E PO Take  by mouth.     • TURMERIC PO Take  by mouth.     • aspirin EC (ECOTRIN) 81 MG Tablet Delayed Response Take 1 Tab by mouth every day. 90 Tab 3     No current facility-administered medications for this visit.       Patient Active Problem List    Diagnosis  "Date Noted   • Hypothyroidism    • Obstructive sleep apnea syndrome 11/24/2020   • Healthcare maintenance 02/04/2020   • Hyperlipidemia 02/04/2020   • Atrial fibrillation (HCC) 12/26/2019       Family History   Problem Relation Age of Onset   • Thyroid Mother    • Heart Disease Mother    • Diabetes Father    • Thyroid Sister    • Heart Disease Brother    • Sleep Apnea Brother        She  has a past medical history of Atrial fibrillation (HCC), Hypothyroidism, and Thyroid disease.  She  has no past surgical history on file.       Objective:   Vitals obtained by patient:  /59 (BP Location: Left arm, Patient Position: Sitting, BP Cuff Size: Adult) Comment: per patient for video visit  Pulse 60   Ht 1.753 m (5' 9\")   Wt 73.7 kg (162 lb 6.4 oz)   SpO2 96%   BMI 23.98 kg/m²     Physical Exam:  Well-appearing    Assessment and Plan:   The following treatment plan was discussed:     1. Mixed hyperlipidemia  - CT-CARDIAC SCORING; Future    2. Subclinical iodine-deficiency hypothyroidism    3. Paroxysmal atrial fibrillation (HCC)    4. Obstructive sleep apnea syndrome    Other orders  - levothyroxine (SYNTHROID) 75 MCG Tab; TAKE 1 TABLET BY MOUTH EVERY DAY ON AN EMPTY STOMACH    1.  Paroxysmal atrial fibrillation continue flecainide.  2.  Hyperlipidemia.  Intermediate risk.  Check coronary calcium scan.  3.  Hypothyroidism on treatment.  4.  Sleep apnea treatment with CPAP.    Follow-up: Return in about 6 months (around 10/27/2021).    Face to Face Video Visit:   I spent 15 minutes with patient/guardian and I conducted this visit with audio and video present.  Francis Yates M.D.       "

## 2021-05-05 ENCOUNTER — HOSPITAL ENCOUNTER (OUTPATIENT)
Dept: RADIOLOGY | Facility: MEDICAL CENTER | Age: 56
End: 2021-05-05
Attending: INTERNAL MEDICINE
Payer: COMMERCIAL

## 2021-05-05 DIAGNOSIS — E78.2 MIXED HYPERLIPIDEMIA: ICD-10-CM

## 2021-05-05 PROCEDURE — 4410556 CT-CARDIAC SCORING (SELF PAY ONLY)

## 2021-05-12 ENCOUNTER — TELEPHONE (OUTPATIENT)
Dept: CARDIOLOGY | Facility: MEDICAL CENTER | Age: 56
End: 2021-05-12

## 2021-05-12 DIAGNOSIS — E78.2 MIXED HYPERLIPIDEMIA: ICD-10-CM

## 2021-05-12 DIAGNOSIS — R93.1 ELEVATED CORONARY ARTERY CALCIUM SCORE: ICD-10-CM

## 2021-05-12 RX ORDER — ATORVASTATIN CALCIUM 20 MG/1
20 TABLET, FILM COATED ORAL EVERY EVENING
Qty: 90 TABLET | Refills: 3 | Status: SHIPPED | OUTPATIENT
Start: 2021-05-12 | End: 2022-04-29

## 2021-05-12 NOTE — TELEPHONE ENCOUNTER
----- Message from Francis Yates M.D. sent at 5/11/2021 12:10 PM PDT -----  Elevated coronary calcium would start Lipitor 20 mg qd and referral to vascular medicine

## 2021-06-18 ENCOUNTER — HOSPITAL ENCOUNTER (OUTPATIENT)
Dept: LAB | Facility: MEDICAL CENTER | Age: 56
End: 2021-06-18
Attending: FAMILY MEDICINE
Payer: COMMERCIAL

## 2021-06-18 LAB
ALBUMIN SERPL BCP-MCNC: 4.6 G/DL (ref 3.2–4.9)
ALP SERPL-CCNC: 106 U/L (ref 30–99)
ALT SERPL-CCNC: 23 U/L (ref 2–50)
AST SERPL-CCNC: 24 U/L (ref 12–45)
BILIRUB CONJ SERPL-MCNC: <0.2 MG/DL (ref 0.1–0.5)
BILIRUB INDIRECT SERPL-MCNC: ABNORMAL MG/DL (ref 0–1)
BILIRUB SERPL-MCNC: 0.5 MG/DL (ref 0.1–1.5)
PROT SERPL-MCNC: 7.1 G/DL (ref 6–8.2)
TSH SERPL DL<=0.005 MIU/L-ACNC: 1.84 UIU/ML (ref 0.38–5.33)

## 2021-06-18 PROCEDURE — 36415 COLL VENOUS BLD VENIPUNCTURE: CPT

## 2021-06-18 PROCEDURE — 80076 HEPATIC FUNCTION PANEL: CPT

## 2021-06-18 PROCEDURE — 84443 ASSAY THYROID STIM HORMONE: CPT

## 2021-07-09 ENCOUNTER — OFFICE VISIT (OUTPATIENT)
Dept: VASCULAR LAB | Facility: MEDICAL CENTER | Age: 56
End: 2021-07-09
Attending: FAMILY MEDICINE
Payer: COMMERCIAL

## 2021-07-09 VITALS
HEART RATE: 62 BPM | HEIGHT: 69 IN | DIASTOLIC BLOOD PRESSURE: 65 MMHG | BODY MASS INDEX: 24.29 KG/M2 | SYSTOLIC BLOOD PRESSURE: 100 MMHG | WEIGHT: 164 LBS

## 2021-07-09 DIAGNOSIS — G47.33 OBSTRUCTIVE SLEEP APNEA SYNDROME: ICD-10-CM

## 2021-07-09 DIAGNOSIS — E78.00 PRIMARY HYPERCHOLESTEROLEMIA: ICD-10-CM

## 2021-07-09 DIAGNOSIS — I48.0 PAROXYSMAL ATRIAL FIBRILLATION (HCC): ICD-10-CM

## 2021-07-09 DIAGNOSIS — E02 SUBCLINICAL IODINE-DEFICIENCY HYPOTHYROIDISM: ICD-10-CM

## 2021-07-09 PROCEDURE — 99215 OFFICE O/P EST HI 40 MIN: CPT | Performed by: FAMILY MEDICINE

## 2021-07-09 PROCEDURE — 99212 OFFICE O/P EST SF 10 MIN: CPT

## 2021-07-09 ASSESSMENT — ENCOUNTER SYMPTOMS
WHEEZING: 0
HEMOPTYSIS: 0
TREMORS: 0
DIZZINESS: 0
VOMITING: 0
NAUSEA: 0
WEAKNESS: 0
ABDOMINAL PAIN: 0
HEADACHES: 0
FOCAL WEAKNESS: 0
CHILLS: 0
MYALGIAS: 0
DIARRHEA: 0
SHORTNESS OF BREATH: 0
FEVER: 0
COUGH: 0
SEIZURES: 0
BRUISES/BLEEDS EASILY: 0
PALPITATIONS: 0

## 2021-07-09 ASSESSMENT — FIBROSIS 4 INDEX: FIB4 SCORE: 1.000869187473958861

## 2021-07-09 NOTE — PATIENT INSTRUCTIONS
MEDITERRANEAN DIET PATIENT GUIDES:   1) MEDITERRANEAN LIVING GUIDE: https://www.mediterraneanliving.com/  2) MED PLATE METHOD: https://www.nutrition.va.gov/docs/UpdatedPatientEd/Mediterraneandiet.pdf  3) SAMPLE MENU: https://www.[employer]able/wp-content/uploads/2013/06/The-Mediterranean-Diet.pdf  For more information about eating the Med Way, visit [employer].    7 SIMPLE STEPS TO FOLLOW THE MEDITERRANEAN DIET:   https://tinyclues.com/7-simple-steps-to-follow-the-mediterranean-diet/    Eating like those who live in the Mediterranean region has been shown to promote health and decrease risk of many chronic diseases.   Following a traditional Mediterranean-style eating pattern has been shown to   -decrease some forms of cancer,   -protect against cognitive decline,   -improve eye health,   -decrease the risk of type 2 diabetes,   -help manage blood pressure,   -reduce cardiovascular disease, and is   -more effective than a low-fat diet for weight loss.     Eating the Mediterranean way is not only healthy, it is delicious and satisfying.   Foods that you once thought of as too high in fat or unhealthy, including nuts, olive oil, olives, and whole grains, become an everyday part of your diet.   The following simple steps will help you eat the Med Way every day.    1. CHANGE YOUR PROTEINS   Replace some of the meat in your dish with plant proteins such as beans, nuts, and seeds often.  Eat fish and seafood at least two to three times per week. Include fatty fish, such as mackerel or salmon at least once a week. Eat fried fish only occasionally.  Choose white-meat poultry such as turkey or chicken breast.  Limit red meat and/or choose lean red meat.  Greatly limit or eliminate processed meats.    2. SWAP YOUR FATS   Choose olive oil.  Replace solid fats such as butter and margarine with olive oil or canola oil.  Use olive oil for cooking, in dressings, and marinades.  Aim to consume at least four  tablespoons of olive oil a day, while keeping within your calorie budget.    3. EAT MORE VEGETABLES   Get at least three servings (three cups) of vegetables per day.  Choose a variety of colors.  Eat more dark green leafy vegetables such as collards, kale, spinach, chard, and turnip greens.    4. EAT MORE FRUITS   Get at least two servings (two cups) of fruits per day.  Choose a variety of colors.  Include berries often.    5. SNACK ON NUTS AND SEEDS   Choose at least three ounces (three small handfuls) of nuts and seeds per week, while keeping within your calorie budget.  Avoid candied, honey-roasted, and heavily salted nuts and seeds.    6. MAKE YOUR GRAINS WHOLE   Eat grains as grains  Choose whole grains such as oatmeal, quinoa, brown rice, and popcorn.  Look for “whole” in the first ingredient on the ingredient list (e.g., “whole wheat”) when choosing bread, pasta, and other grain-based foods.    7.  RETHINK SWEETS   Limit your sugar intake  Choose no more than three servings per week of high-sugar foods and drinks such as sugar-sweetened snacks, candies, desserts, or beverages.

## 2021-07-09 NOTE — PROGRESS NOTES
[facility] - Initial Visit  Date of Service: 07/09/21     Zehra Adam has been referred for evaluation and management of dyslipidemia    Referral Source: Francis Yates M.D.     HPI  Current/interval symptoms or concerns:  Ongoing HLD with recent CAC that is elevated.  Concern for tx optinos.    Change in weight: reduced  BMI Readings from Last 5 Encounters:   07/09/21 24.22 kg/m²   04/27/21 23.98 kg/m²   12/16/20 26.58 kg/m²   11/24/20 25.99 kg/m²   09/21/20 26.58 kg/m²     Exercise habits: moderate regular exercise program  Dietary patterns: low fat, low carbohydrate, reduced red meat   Reported barriers to care: none     History of ASCVD: CAD as evidenced by high CAC score   High-risk conditions: None    Secondary causes of dyslipidemia:  Endocrine/Hypothyroidism:  Known subclinical hypothyroidism, opted for tx, tolerating meds, TSH wnl   Liver disease: mild high ALT in past, currently stable 6/2021   Renal disease/nephrotic syndrome:  none reported  Medications: None  Other Established (non-atherosclerotic) Vascular Disease, if Present: None  Age at Initial Diagnosis of Dyslipidemia: >5yrs    Current Prescription Lipid Lowering Medications - including dose:   Statin: atorva 20mg   Non-Statin: None  Current Lipid Lowering and Related Supplements:   None  Any Current Side Effects Potentially Related to Lipid Lowering therapy?   No  Current Adherence to Lipid Lowering Therapies   Complete  Previously Attempted Interventions for Lipids - including outcome  Statin: None     Outcome: N/A  Non-Statin: None    Outcome: N/A  Any Previous History of Statin Intolerance?   No  Baseline Lipids Prior to Treatment:      Ref. Range 3/2/2016 07:33   Cholesterol,Tot Latest Ref Range: 100 - 199 mg/dL 284 (H)   Triglycerides Latest Ref Range: 0 - 149 mg/dL 86   HDL Latest Ref Range: >=40 mg/dL 71   LDL Latest Ref Range: <100 mg/dL 196 (H)     Anticoagulation/antiplats: Yes, Details: ASA 81mg, no bleeding  noted     History of other CV risk factors:     HTN:  No dx or meds   Adherence to current HTN meds: compliant all of the time      T2D: No     PAST MEDICAL HISTORY:  has a past medical history of Atrial fibrillation (HCC), Hyperlipidemia, Hypothyroidism, and Thyroid disease.    PAST SURGICAL HISTORY:  has no past surgical history on file.    CURRENT MEDICATIONS:   Current Outpatient Medications:   •  atorvastatin, 20 mg, Oral, Q EVENING, Taking  •  levothyroxine, TAKE 1 TABLET BY MOUTH EVERY DAY ON AN EMPTY STOMACH, Taking  •  flecainide, 50 mg, Oral, BID, Taking  •  Multiple Vitamin (MULTI-VITAMIN PO), Take  by mouth., Taking  •  Glucosamine HCl (GLUCOSAMINE PO), Take  by mouth., Taking  •  VITAMIN D PO, Take  by mouth., Taking  •  VITAMIN E PO, Take  by mouth., Taking  •  TURMERIC PO, Take  by mouth., Taking  •  aspirin EC, 81 mg, Oral, DAILY, Taking    ALLERGIES: Codeine    FAMILY HISTORY:   Family History   Problem Relation Age of Onset   • Thyroid Mother    • Heart Disease Mother         Afib, TAVR   • Hypertension Mother    • Other Mother    • Diabetes Father    • Thyroid Sister    • Heart Disease Brother    • Sleep Apnea Brother    • Hyperlipidemia Brother    • Heart Attack Maternal Grandmother    • Heart Attack Brother         ? MI (69)   • Arrythmia Brother      SOCIAL HISTORY   Social History     Tobacco Use   • Smoking status: Never Smoker   • Smokeless tobacco: Never Used   Substance Use Topics   • Alcohol use: Yes     Alcohol/week: 0.6 oz     Types: 1 Glasses of wine per week     Comment: occasional 2 drinks   • Drug use: No     Review of Systems   Constitutional: Negative for chills, fever and malaise/fatigue.   Respiratory: Negative for cough, hemoptysis, shortness of breath and wheezing.    Cardiovascular: Negative for chest pain, palpitations and leg swelling.   Gastrointestinal: Negative for abdominal pain, diarrhea, nausea and vomiting.   Musculoskeletal: Negative for joint pain and myalgias.  "  Skin: Negative for itching and rash.   Neurological: Negative for dizziness, tremors, focal weakness, seizures, weakness and headaches.   Endo/Heme/Allergies: Does not bruise/bleed easily.     Vitals:    07/09/21 0820   BP: 100/65   BP Location: Left arm   Patient Position: Sitting   BP Cuff Size: Adult   Pulse: 62   Weight: 74.4 kg (164 lb)   Height: 1.753 m (5' 9\")      BP Readings from Last 5 Encounters:   07/09/21 100/65   04/27/21 106/59   12/16/20 100/58   11/24/20 124/74   02/04/20 100/80     Physical Exam  Constitutional:       Appearance: She is well-developed.   HENT:      Head: Normocephalic and atraumatic.   Eyes:      Conjunctiva/sclera: Conjunctivae normal.      Pupils: Pupils are equal, round, and reactive to light.   Neck:      Thyroid: No thyromegaly.      Vascular: No JVD.   Cardiovascular:      Rate and Rhythm: Normal rate and regular rhythm.      Pulses:           Radial pulses are 2+ on the right side and 2+ on the left side.        Dorsalis pedis pulses are 2+ on the right side and 2+ on the left side.        Posterior tibial pulses are 2+ on the right side and 2+ on the left side.      Heart sounds: Normal heart sounds. No murmur heard.   No friction rub. No gallop.    Pulmonary:      Effort: Pulmonary effort is normal. No respiratory distress.      Breath sounds: Normal breath sounds.   Musculoskeletal:      Cervical back: Normal range of motion and neck supple.   Lymphadenopathy:      Cervical: No cervical adenopathy.   Skin:     General: Skin is warm and dry.   Neurological:      Mental Status: She is alert and oriented to person, place, and time.      Cranial Nerves: No cranial nerve deficit.         DATA REVIEW:  Most Recent Lipid Panel:   Lab Results   Component Value Date    CHOLSTRLTOT 258 (H) 12/31/2020    TRIGLYCERIDE 62 12/31/2020    HDL 70 12/31/2020     (H) 12/31/2020     Other Pertinent Blood Work:   Lab Results   Component Value Date    SODIUM 138 12/31/2020    " POTASSIUM 3.9 2020    CHLORIDE 102 2020    CO2 26 2020    ANION 10.0 2020    GLUCOSE 79 2020    BUN 12 2020    CREATININE 0.65 2020    CALCIUM 9.6 2020    ASTSGOT 24 2021    ALTSGPT 23 2021    ALKPHOSPHAT 106 (H) 2021    TBILIRUBIN 0.5 2021    ALBUMIN 4.6 2021    AGRATIO 1.7 2020    TSHULTRASEN 1.840 2021     VASCULAR IMAGING:     Last EKG:   Results for orders placed or performed in visit on 19   EKG   Result Value Ref Range    Report       [facility]    Test Date:  2019  Pt Name:    ARTEMIO KIM               Department: [facility]  MRN:        [id number]                      Room:  Gender:     Female                       Technician:   :        [id number]                   Requested By:ALISA MAYA  Order #:    [id number]                    Reading MD: Clarence Etienne MD    Measurements  Intervals                                Axis  Rate:       57                           P:          2  ID:         145                          QRS:        -13  QRSD:       102                          T:          19  QT:         436  QTc:        425    Interpretive Statements  SLOW SINUS ARRHYTHMIA, RATE  49- 66  No previous ECG available for comparison  Electronically Signed On 2019 17:16:21 PST by Clarence Etienne MD       Echo 2020  Normal echocardiogram.   No prior study is available for comparison.     Carotid 2020  1.  There is no significant atherosclerotic plaque.   2.  There is no evidence of carotid occlusion.   3. Vertebral arteries demonstrate antegrade flow.   4. Diameter reduction in the internal carotid arteries: less than 50%. There is no hemodynamically significant stenosis.   5. Technologist indicates real-time evaluation, that there appear to be findings suggestive of incomplete right subclavian steal.    CAC 2021  Coronary calcification:  LMA - 0.0  LCX - 53.6  LAD -  186.0  RCA - 4.3  PDA - 0.0   Total Calcium Score: 243.9   Percentile: Calcium score is above the 90th percentile for the patient's age and sex.   Other findings:  Heart: Normal size.  Lungs: Clear.  Mediastinum: Normal.  Upper abdomen: Normal.    ASSESSMENT AND PLAN  1. Primary hypercholesterolemia  REFERRAL TO [facility] IMPROVEMENT PROGRAMS (HIP)    Comp Metabolic Panel    Lipid Profile    LIPOPROTEIN A    CRP HIGH SENSITIVE (CARDIAC)   2. Paroxysmal atrial fibrillation (HCC)     3. Obstructive sleep apnea syndrome     4. Subclinical iodine-deficiency hypothyroidism       Patient Type, check all that apply: Primary Prevention    Established Atherosclerotic Cardiovascular Disease (ASCVD): yes    1) subclinical CAD, evidenced by  (97th%ile for age/gender)  Distribution in 3 vessels, sparing LMA  Low risk for near-term CVD however 97th%ile indicates poor trajectory of risk of future ASCVD  - suggested for aggressive LLT, TLC to achieve LDL goal as noted below   - recommended for ASA 81mg due to PAF as well   - check lp(a)     2) PAF, stable, rhythm-controlled  NICKI-VASC = 1, no indications for OAC   - continue meds with antiplat, defer mgmt to cardiology     3) carotid art atherosclerosis with possible R subclav steal   No hx of TIA/CVA or symptoms, no RUE claudcation  - repeat imaging pending, consider CTA if progressive   - continue med mgmt     Other Established (non-atherosclerotic) Vascular Disease, if Present:  None    Evidence of Heterozygous Familial Hypercholesterolemia (FH):   Yes   - baseline LDL-C >190  - fhx of HLD and heart disease as noted - to obtain pre-tx LDL's from family if possible     FH genotyping:  Reviewed as option as monogenic FH would require more intensive therapy and has downstream increased ASCVD risk compared to polygenic vs other primary HLD    - she will call if wishes to proceed with testing via [facility], or we can address at next visit     ACC/AHA Indication for Statin  Therapy, deshawn all that apply:  LDL-C at baseline >190 mg/dl: Indication for Moderate intensity statin, consider high-intensity based upon existing high CAC and established carotid art athero  Primary Severe HLD / FH (baseline LDL-C >190)    Calculated Risk for ASCVD, if applicable    The 10-year ASCVD risk score (Rao MARTINEZ Jr., et al., 2013) is: 1.7%, n/a due to baseline LDL-C >190, underestimation   MARTINEZ risk with CAC = 5.5% - provided review and handouts to patient   MARTINEZ risk w/o CAC = 1.7%     Other Significant Risk Markers, if any, deshawn all that apply   elevated coronary calcium score and Family history of premature ASCVD in first degree relative   Risk-enhancers: Famhx of premature ASCVD and Persistently elevated LDL-C >159     - check lp(a)     Goal LDL-C and nonHDL-C based on Clinic Protocol  LDL-C <100 (consider non-HDL-C <130, apoB <90), will strongly consider LDL-C <70  At goal? no    LIFESTYLE RECOMMENDATIONS FROM TODAY'S VISIT:    Smoking:  reports that she has never smoked. She has never used smokeless tobacco.   - continued complete avoidance of all tobacco products     Physical Activity: continue healthy activity to improve CV fitness, see care instructions for additional details     Weight Management and Nutrition: Dietary plan was discussed with patient at this visit including DASH, low sodium and/or as outlined in care instructions   Eating Plan: Concentrate on  Low sat/Trans fat, Low simple carb and DASH/Med Style diet    LIPID LOWERING MEDICATION MANAGEMENT:     Statin Therapy Recommendations from Today’s Visit:   - continue atorvastatin 20mg for now, low threshold to boost to high-intensity      Non-Statin Medications Recommendations from Today’s Visit:   Zetia: add as 2nd agent   Nexletol (avoid simva >20, prava >40): not currently indicated   Rx-grade Omega-3 FA: not currently indicated   BAS: not currently indicated     Indication for PCSK9 Inhibitor, if applicable:  Not currently  indicated    Supplements Recommended at this visit:   - consider cholest-off      RECOMMENDATIONS FOR OTHER CV RISK FACTORS:    1) BLOOD PRESSURE MANAGEMENT:  ACC/AHA (2017) goal <130/80  Home BP at goal: yes  Office BP at goal:  yes   Plan:   - continue healthy diet, activity, weight mgmt   Monitoring:   - routine clinic-based BP measurements at least once annually   Medications: no meds indicated at this time      2) Glycemic Status: Normal    ANTITHROMBOTIC THERAPY  - potential benefit for ASA based upon high CAC cinthya if >300     OTHER ISSUES:    1) CHRIS on CPAP  Strongly encouraged routine adherence to reduce RV strain, improve overall fatigue symptoms, and modestly improve SBP.    - continue CPAP, defer mgmt to sleep MD    2) hypothyroidism, stable  Tolerating meds, TSH normal   - continue current treatment plan, defer mgmt to PCP     Studies Ordered at Todays Visit: carotid  Duplex  Pending   Blood Work Ordered At Today’s visit: as noted above   Follow-Up: Aug with me     Josué Cooper M.D.  [facility]   [facility]   [phone]     CC:  EDGARDO Reid, Francis SOLARES M.D.

## 2021-07-12 ENCOUNTER — HOSPITAL ENCOUNTER (OUTPATIENT)
Dept: RADIOLOGY | Facility: MEDICAL CENTER | Age: 56
End: 2021-07-12
Attending: SURGERY
Payer: COMMERCIAL

## 2021-07-12 DIAGNOSIS — R09.89 CAROTID BRUIT, UNSPECIFIED LATERALITY: ICD-10-CM

## 2021-07-12 PROCEDURE — 93880 EXTRACRANIAL BILAT STUDY: CPT

## 2021-07-26 ENCOUNTER — APPOINTMENT (RX ONLY)
Dept: URBAN - METROPOLITAN AREA CLINIC 4 | Facility: CLINIC | Age: 56
Setting detail: DERMATOLOGY
End: 2021-07-26

## 2021-07-26 DIAGNOSIS — L57.8 OTHER SKIN CHANGES DUE TO CHRONIC EXPOSURE TO NONIONIZING RADIATION: ICD-10-CM

## 2021-07-26 DIAGNOSIS — Z71.89 OTHER SPECIFIED COUNSELING: ICD-10-CM

## 2021-07-26 DIAGNOSIS — L56.5 DISSEMINATED SUPERFICIAL ACTINIC POROKERATOSIS (DSAP): ICD-10-CM

## 2021-07-26 DIAGNOSIS — D18.0 HEMANGIOMA: ICD-10-CM

## 2021-07-26 DIAGNOSIS — D22 MELANOCYTIC NEVI: ICD-10-CM

## 2021-07-26 DIAGNOSIS — L82.1 OTHER SEBORRHEIC KERATOSIS: ICD-10-CM

## 2021-07-26 DIAGNOSIS — L91.8 OTHER HYPERTROPHIC DISORDERS OF THE SKIN: ICD-10-CM

## 2021-07-26 DIAGNOSIS — R23.3 SPONTANEOUS ECCHYMOSES: ICD-10-CM

## 2021-07-26 DIAGNOSIS — L81.4 OTHER MELANIN HYPERPIGMENTATION: ICD-10-CM

## 2021-07-26 PROBLEM — D18.01 HEMANGIOMA OF SKIN AND SUBCUTANEOUS TISSUE: Status: ACTIVE | Noted: 2021-07-26

## 2021-07-26 PROBLEM — D22.9 MELANOCYTIC NEVI, UNSPECIFIED: Status: ACTIVE | Noted: 2021-07-26

## 2021-07-26 PROCEDURE — 99203 OFFICE O/P NEW LOW 30 MIN: CPT

## 2021-07-26 PROCEDURE — ? SUNSCREEN RECOMMENDATIONS

## 2021-07-26 PROCEDURE — ? COUNSELING

## 2021-07-26 ASSESSMENT — LOCATION DETAILED DESCRIPTION DERM
LOCATION DETAILED: RIGHT PROXIMAL DORSAL FOREARM
LOCATION DETAILED: RIGHT CENTRAL MALAR CHEEK
LOCATION DETAILED: LEFT PROXIMAL DORSAL FOREARM
LOCATION DETAILED: RIGHT POSTERIOR SHOULDER
LOCATION DETAILED: SUPERIOR LUMBAR SPINE
LOCATION DETAILED: RIGHT ANTERIOR DISTAL THIGH
LOCATION DETAILED: LEFT ANTERIOR SHOULDER
LOCATION DETAILED: LEFT PROXIMAL PRETIBIAL REGION
LOCATION DETAILED: LEFT CHIN
LOCATION DETAILED: LEFT DISTAL POSTERIOR THIGH
LOCATION DETAILED: RIGHT INFERIOR FOREHEAD
LOCATION DETAILED: RIGHT ANTERIOR SHOULDER
LOCATION DETAILED: RIGHT PROXIMAL PRETIBIAL REGION
LOCATION DETAILED: LEFT INFERIOR MEDIAL FOREHEAD
LOCATION DETAILED: RIGHT RIB CAGE
LOCATION DETAILED: RIGHT LATERAL DISTAL CALF
LOCATION DETAILED: LEFT ANTERIOR PROXIMAL THIGH
LOCATION DETAILED: RIGHT PROXIMAL POSTERIOR THIGH
LOCATION DETAILED: LEFT DISTAL PRETIBIAL REGION
LOCATION DETAILED: LEFT CENTRAL MALAR CHEEK
LOCATION DETAILED: RIGHT INFERIOR UPPER BACK
LOCATION DETAILED: RIGHT PROXIMAL POSTERIOR UPPER ARM
LOCATION DETAILED: LEFT PROXIMAL CALF
LOCATION DETAILED: LEFT ANTERIOR DISTAL THIGH
LOCATION DETAILED: RIGHT DISTAL CALF
LOCATION DETAILED: LEFT LATERAL DISTAL PRETIBIAL REGION
LOCATION DETAILED: LEFT INFERIOR MEDIAL MALAR CHEEK
LOCATION DETAILED: PERIUMBILICAL SKIN
LOCATION DETAILED: LEFT SUPERIOR UPPER BACK
LOCATION DETAILED: MIDDLE STERNUM
LOCATION DETAILED: INFERIOR THORACIC SPINE
LOCATION DETAILED: LEFT BUTTOCK
LOCATION DETAILED: RIGHT DISTAL POSTERIOR THIGH
LOCATION DETAILED: LEFT POSTERIOR SHOULDER
LOCATION DETAILED: RIGHT INFERIOR CENTRAL MALAR CHEEK
LOCATION DETAILED: LEFT RIB CAGE
LOCATION DETAILED: SUPERIOR THORACIC SPINE
LOCATION DETAILED: RIGHT LATERAL POSTERIOR ANKLE
LOCATION DETAILED: RIGHT DISTAL PRETIBIAL REGION
LOCATION DETAILED: LEFT INFERIOR UPPER BACK
LOCATION DETAILED: LEFT PROXIMAL POSTERIOR UPPER ARM

## 2021-07-26 ASSESSMENT — LOCATION ZONE DERM
LOCATION ZONE: FACE
LOCATION ZONE: LEG
LOCATION ZONE: TRUNK
LOCATION ZONE: ARM

## 2021-07-26 ASSESSMENT — LOCATION SIMPLE DESCRIPTION DERM
LOCATION SIMPLE: LEFT CHEEK
LOCATION SIMPLE: LEFT UPPER BACK
LOCATION SIMPLE: LEFT BUTTOCK
LOCATION SIMPLE: LEFT SHOULDER
LOCATION SIMPLE: CHEST
LOCATION SIMPLE: LEFT PRETIBIAL REGION
LOCATION SIMPLE: RIGHT FOREARM
LOCATION SIMPLE: RIGHT ANKLE
LOCATION SIMPLE: RIGHT POSTERIOR THIGH
LOCATION SIMPLE: LEFT POSTERIOR THIGH
LOCATION SIMPLE: CHIN
LOCATION SIMPLE: RIGHT CALF
LOCATION SIMPLE: LEFT FOREHEAD
LOCATION SIMPLE: ABDOMEN
LOCATION SIMPLE: LEFT FOREARM
LOCATION SIMPLE: RIGHT THIGH
LOCATION SIMPLE: RIGHT SHOULDER
LOCATION SIMPLE: LEFT POSTERIOR UPPER ARM
LOCATION SIMPLE: RIGHT UPPER BACK
LOCATION SIMPLE: RIGHT FOREHEAD
LOCATION SIMPLE: UPPER BACK
LOCATION SIMPLE: LOWER BACK
LOCATION SIMPLE: LEFT THIGH
LOCATION SIMPLE: RIGHT POSTERIOR UPPER ARM
LOCATION SIMPLE: RIGHT CHEEK
LOCATION SIMPLE: RIGHT LOWER LEG
LOCATION SIMPLE: RIGHT PRETIBIAL REGION
LOCATION SIMPLE: LEFT CALF

## 2021-07-26 NOTE — PROCEDURE: MIPS QUALITY
Quality 110: Preventive Care And Screening: Influenza Immunization: Influenza Immunization Ordered or Recommended, but not Administered due to system reason
Quality 431: Preventive Care And Screening: Unhealthy Alcohol Use - Screening: Patient screened for unhealthy alcohol use using a single question and scores less than 2 times per year
Detail Level: Detailed
Quality 111:Pneumonia Vaccination Status For Older Adults: Pneumococcal Vaccination not Administered or Previously Received, Reason not Otherwise Specified
Quality 226: Preventive Care And Screening: Tobacco Use: Screening And Cessation Intervention: Patient screened for tobacco use and is an ex/non-smoker
Quality 130: Documentation Of Current Medications In The Medical Record: Current Medications Documented

## 2021-08-19 ENCOUNTER — HOSPITAL ENCOUNTER (OUTPATIENT)
Dept: LAB | Facility: MEDICAL CENTER | Age: 56
End: 2021-08-19
Attending: FAMILY MEDICINE
Payer: COMMERCIAL

## 2021-08-19 DIAGNOSIS — E78.00 PRIMARY HYPERCHOLESTEROLEMIA: ICD-10-CM

## 2021-08-19 LAB
ALBUMIN SERPL BCP-MCNC: 4.4 G/DL (ref 3.2–4.9)
ALBUMIN/GLOB SERPL: 1.8 G/DL
ALP SERPL-CCNC: 92 U/L (ref 30–99)
ALT SERPL-CCNC: 21 U/L (ref 2–50)
ANION GAP SERPL CALC-SCNC: 7 MMOL/L (ref 7–16)
AST SERPL-CCNC: 19 U/L (ref 12–45)
BILIRUB SERPL-MCNC: 0.5 MG/DL (ref 0.1–1.5)
BUN SERPL-MCNC: 12 MG/DL (ref 8–22)
CALCIUM SERPL-MCNC: 9.3 MG/DL (ref 8.5–10.5)
CHLORIDE SERPL-SCNC: 103 MMOL/L (ref 96–112)
CHOLEST SERPL-MCNC: 140 MG/DL (ref 100–199)
CO2 SERPL-SCNC: 28 MMOL/L (ref 20–33)
CREAT SERPL-MCNC: 0.59 MG/DL (ref 0.5–1.4)
CRP SERPL HS-MCNC: 0.4 MG/L (ref 0–3)
FASTING STATUS PATIENT QL REPORTED: NORMAL
GLOBULIN SER CALC-MCNC: 2.4 G/DL (ref 1.9–3.5)
GLUCOSE SERPL-MCNC: 84 MG/DL (ref 65–99)
HDLC SERPL-MCNC: 64 MG/DL
LDLC SERPL CALC-MCNC: 64 MG/DL
POTASSIUM SERPL-SCNC: 3.8 MMOL/L (ref 3.6–5.5)
PROT SERPL-MCNC: 6.8 G/DL (ref 6–8.2)
SODIUM SERPL-SCNC: 138 MMOL/L (ref 135–145)
TRIGL SERPL-MCNC: 62 MG/DL (ref 0–149)

## 2021-08-19 PROCEDURE — 86141 C-REACTIVE PROTEIN HS: CPT

## 2021-08-19 PROCEDURE — 36415 COLL VENOUS BLD VENIPUNCTURE: CPT

## 2021-08-19 PROCEDURE — 80061 LIPID PANEL: CPT

## 2021-08-19 PROCEDURE — 83695 ASSAY OF LIPOPROTEIN(A): CPT

## 2021-08-19 PROCEDURE — 80053 COMPREHEN METABOLIC PANEL: CPT

## 2021-08-20 ENCOUNTER — OFFICE VISIT (OUTPATIENT)
Dept: VASCULAR LAB | Facility: MEDICAL CENTER | Age: 56
End: 2021-08-20
Attending: FAMILY MEDICINE
Payer: COMMERCIAL

## 2021-08-20 VITALS
DIASTOLIC BLOOD PRESSURE: 76 MMHG | SYSTOLIC BLOOD PRESSURE: 120 MMHG | BODY MASS INDEX: 24.59 KG/M2 | HEIGHT: 69 IN | HEART RATE: 67 BPM | WEIGHT: 166 LBS

## 2021-08-20 DIAGNOSIS — I25.10 CORONARY ARTERY CALCIFICATION SEEN ON CT SCAN: ICD-10-CM

## 2021-08-20 DIAGNOSIS — Z79.02 LONG TERM (CURRENT) USE OF ANTITHROMBOTICS/ANTIPLATELETS: ICD-10-CM

## 2021-08-20 DIAGNOSIS — E02 SUBCLINICAL IODINE-DEFICIENCY HYPOTHYROIDISM: ICD-10-CM

## 2021-08-20 DIAGNOSIS — I48.0 PAROXYSMAL ATRIAL FIBRILLATION (HCC): ICD-10-CM

## 2021-08-20 DIAGNOSIS — E78.00 PRIMARY HYPERCHOLESTEROLEMIA: ICD-10-CM

## 2021-08-20 DIAGNOSIS — R93.1 AGATSTON CAC SCORE 200-399: ICD-10-CM

## 2021-08-20 DIAGNOSIS — G47.33 OBSTRUCTIVE SLEEP APNEA SYNDROME: ICD-10-CM

## 2021-08-20 DIAGNOSIS — I65.23 ATHEROSCLEROSIS OF BOTH CAROTID ARTERIES: ICD-10-CM

## 2021-08-20 PROCEDURE — 99214 OFFICE O/P EST MOD 30 MIN: CPT | Performed by: FAMILY MEDICINE

## 2021-08-20 PROCEDURE — 99212 OFFICE O/P EST SF 10 MIN: CPT

## 2021-08-20 ASSESSMENT — ENCOUNTER SYMPTOMS
PALPITATIONS: 0
HEADACHES: 0
DIARRHEA: 0
ABDOMINAL PAIN: 0
BRUISES/BLEEDS EASILY: 0
CHILLS: 0
VOMITING: 0
NAUSEA: 0
TREMORS: 0
HEMOPTYSIS: 0
COUGH: 0
FEVER: 0
MYALGIAS: 0
SEIZURES: 0
SHORTNESS OF BREATH: 0
WEAKNESS: 0
FOCAL WEAKNESS: 0
DIZZINESS: 0
WHEEZING: 0

## 2021-08-20 ASSESSMENT — FIBROSIS 4 INDEX: FIB4 SCORE: 0.83

## 2021-08-20 NOTE — PROGRESS NOTES
[facility]-u[facility]   Date of Service: 08/20/21     Zehra Adam has been referred for evaluation and management of dyslipidemia    Referral Source: Francis Yates M.D.     HPI  Current/interval symptoms or concerns:  Had labs done, no major issues.    Change in weight: reduced  BMI Readings from Last 5 Encounters:   08/20/21 24.51 kg/m²   07/09/21 24.22 kg/m²   04/27/21 23.98 kg/m²   12/16/20 26.58 kg/m²   11/24/20 25.99 kg/m²     Exercise habits: moderate regular exercise program  Dietary patterns: low fat, low carbohydrate, reduced red meat   Reported barriers to care: none     History of ASCVD: CAD as evidenced by high CAC score   High-risk conditions: None    Secondary causes of dyslipidemia:  Endocrine/Hypothyroidism:  Known subclinical hypothyroidism, opted for tx, tolerating meds, TSH wnl   Liver disease: mild high ALT in past, currently stable 6/2021   Renal disease/nephrotic syndrome:  none reported  Medications: None  Other Established (non-atherosclerotic) Vascular Disease, if Present: None  Age at Initial Diagnosis of Dyslipidemia: >5yrs    Current Prescription Lipid Lowering Medications - including dose:   Statin: atorva 20mg since 5/2021   Non-Statin: None  Current Lipid Lowering and Related Supplements:   None  Any Current Side Effects Potentially Related to Lipid Lowering therapy?   No  Current Adherence to Lipid Lowering Therapies   Complete  Previously Attempted Interventions for Lipids - including outcome  Statin: None     Outcome: N/A  Non-Statin: None    Outcome: N/A  Any Previous History of Statin Intolerance? No  Baseline Lipids Prior to Treatment:      Ref. Range 3/2/2016 07:33   Cholesterol,Tot Latest Ref Range: 100 - 199 mg/dL 284 (H)   Triglycerides Latest Ref Range: 0 - 149 mg/dL 86   HDL Latest Ref Range: >=40 mg/dL 71   LDL Latest Ref Range: <100 mg/dL 196 (H)     Anticoagulation/antiplats: Yes, Details: ASA 81mg, no bleeding noted     History of other CV  "risk factors:     HTN:  No dx or meds   Adherence to current HTN meds: compliant all of the time      CURRENT MEDICATIONS:   Current Outpatient Medications:   •  atorvastatin, 20 mg, Oral, Q EVENING, Taking  •  levothyroxine, TAKE 1 TABLET BY MOUTH EVERY DAY ON AN EMPTY STOMACH, Taking  •  flecainide, 50 mg, Oral, BID, Taking  •  Multiple Vitamin (MULTI-VITAMIN PO), Take  by mouth., Taking  •  Glucosamine HCl (GLUCOSAMINE PO), Take  by mouth., Taking  •  VITAMIN D PO, Take  by mouth., Taking  •  VITAMIN E PO, Take  by mouth., Taking  •  TURMERIC PO, Take  by mouth., Taking  •  aspirin EC, 81 mg, Oral, DAILY, Taking    SOCIAL HISTORY   Social History     Tobacco Use   • Smoking status: Never Smoker   • Smokeless tobacco: Never Used   Substance Use Topics   • Alcohol use: Yes     Alcohol/week: 0.6 oz     Types: 1 Glasses of wine per week     Comment: occasional 2 drinks   • Drug use: No     Review of Systems   Constitutional: Negative for chills, fever and malaise/fatigue.   Respiratory: Negative for cough, hemoptysis, shortness of breath and wheezing.    Cardiovascular: Negative for chest pain, palpitations and leg swelling.   Gastrointestinal: Negative for abdominal pain, diarrhea, nausea and vomiting.   Musculoskeletal: Negative for joint pain and myalgias.   Skin: Negative for itching and rash.   Neurological: Negative for dizziness, tremors, focal weakness, seizures, weakness and headaches.   Endo/Heme/Allergies: Does not bruise/bleed easily.     Vitals:    08/20/21 0834   BP: 120/76   BP Location: Left arm   Patient Position: Sitting   BP Cuff Size: Adult   Pulse: 67   Weight: 75.3 kg (166 lb)   Height: 1.753 m (5' 9\")      BP Readings from Last 5 Encounters:   08/20/21 120/76   07/09/21 100/65   04/27/21 106/59   12/16/20 100/58   11/24/20 124/74     Physical Exam  Constitutional:       Appearance: She is well-developed.   HENT:      Head: Normocephalic and atraumatic.   Eyes:      Conjunctiva/sclera: " Conjunctivae normal.      Pupils: Pupils are equal, round, and reactive to light.   Neck:      Thyroid: No thyromegaly.      Vascular: No JVD.   Cardiovascular:      Rate and Rhythm: Normal rate and regular rhythm.      Pulses:           Radial pulses are 2+ on the right side and 2+ on the left side.        Dorsalis pedis pulses are 2+ on the right side and 2+ on the left side.        Posterior tibial pulses are 2+ on the right side and 2+ on the left side.      Heart sounds: Normal heart sounds. No murmur heard.   No friction rub. No gallop.    Pulmonary:      Effort: Pulmonary effort is normal. No respiratory distress.      Breath sounds: Normal breath sounds.   Musculoskeletal:      Cervical back: Normal range of motion and neck supple.   Lymphadenopathy:      Cervical: No cervical adenopathy.   Skin:     General: Skin is warm and dry.   Neurological:      Mental Status: She is alert and oriented to person, place, and time.      Cranial Nerves: No cranial nerve deficit.         DATA REVIEW:  Most Recent Lipid Panel:   Lab Results   Component Value Date    CHOLSTRLTOT 140 08/19/2021    TRIGLYCERIDE 62 08/19/2021    HDL 64 08/19/2021    LDL 64 08/19/2021     Lp(a) - pending     Other Pertinent Blood Work:   Lab Results   Component Value Date    SODIUM 138 08/19/2021    POTASSIUM 3.8 08/19/2021    CHLORIDE 103 08/19/2021    CO2 28 08/19/2021    ANION 7.0 08/19/2021    GLUCOSE 84 08/19/2021    BUN 12 08/19/2021    CREATININE 0.59 08/19/2021    CALCIUM 9.3 08/19/2021    ASTSGOT 19 08/19/2021    ALTSGPT 21 08/19/2021    ALKPHOSPHAT 92 08/19/2021    TBILIRUBIN 0.5 08/19/2021    ALBUMIN 4.4 08/19/2021    AGRATIO 1.8 08/19/2021    TSHULTRASEN 1.840 06/18/2021     VASCULAR IMAGING:     Last EKG:   Results for orders placed or performed in visit on 12/26/19   EKG   Result Value Ref Range    Report       [facility]    Test Date:  2019-12-26  Pt Name:    ARTEMIOMIRA KIM               Department: [facility]  MRN:         [id number]                      Room:  Gender:     Female                       Technician: MAGO  :        1965                   Requested By:ALISA MAYA  Order #:    [id number]                    Reading MD: Clarence Etienne MD    Measurements  Intervals                                Axis  Rate:       57                           P:          2  WY:         145                          QRS:        -13  QRSD:       102                          T:          19  QT:         436  QTc:        425    Interpretive Statements  SLOW SINUS ARRHYTHMIA, RATE  49- 66  No previous ECG available for comparison  Electronically Signed On 2019 17:16:21 PST by Clarence Etienne MD       Echo 2020  Normal echocardiogram.   No prior study is available for comparison.     Carotid 2020  1.  There is no significant atherosclerotic plaque.   2.  There is no evidence of carotid occlusion.   3. Vertebral arteries demonstrate antegrade flow.   4. Diameter reduction in the internal carotid arteries: less than 50%. There is no hemodynamically significant stenosis.   5. Technologist indicates real-time evaluation, that there appear to be findings suggestive of incomplete right subclavian steal.    CAC 2021  Coronary calcification:  LMA - 0.0  LCX - 53.6  LAD - 186.0  RCA - 4.3  PDA - 0.0   Total Calcium Score: 243.9   Percentile: Calcium score is above the 90th percentile for the patient's age and sex.   Other findings:  Heart: Normal size.  Lungs: Clear.  Mediastinum: Normal.  Upper abdomen: Normal.    ASSESSMENT AND PLAN  1. Primary hypercholesterolemia  Comp Metabolic Panel    Lipid Profile   2. Paroxysmal atrial fibrillation (HCC)     3. Obstructive sleep apnea syndrome     4. Subclinical iodine-deficiency hypothyroidism     5. Atherosclerosis of both carotid arteries     6. Agatston CAC score 200-399     7. Coronary artery calcification seen on CT scan     8. Long term (current) use of  "antithrombotics/antiplatelets       Patient Type, check all that apply: Primary Prevention    Established Atherosclerotic Cardiovascular Disease (ASCVD): yes    1) subclinical CAD, evidenced by  (97th%ile for age/gender)  Distribution in 3 vessels, sparing LMA  Low risk for near-term CVD however 97th%ile indicates poor trajectory of risk of future ASCVD  - suggested for aggressive LLT, TLC to achieve LDL goal as noted below   - recommended for ASA 81mg due to PAF as well     2) PAF, stable, rhythm-controlled  NICKI-VASC = 1, no indications for OAC   - continue meds with antiplat, defer mgmt to cardiology     3) carotid art atherosclerosis with possible \"incomplete\" R subclav steal   No hx of TIA/CVA or symptoms, no RUE claudcation  - no additional imaging, consider if worsening RUE claudication or symptoms  - continue secondary prevention med mgmt     Other Established (non-atherosclerotic) Vascular Disease, if Present:  None    Evidence of Heterozygous Familial Hypercholesterolemia (FH):   Yes   - baseline LDL-C >190  - fhx of HLD and heart disease as noted - to obtain pre-tx LDL's from family if possible     FH genotyping:  Reviewed as option as monogenic FH would require more intensive therapy and has downstream increased ASCVD risk compared to polygenic vs other primary HLD    - she will call if wishes to proceed with testing via [facility], or we can address at next visit     ACC/AHA Indication for Statin Therapy, deshawn all that apply:  LDL-C at baseline >190 mg/dl: Indication for Moderate intensity statin, consider high-intensity based upon existing high CAC and established carotid art athero  Primary Severe HLD / FH (baseline LDL-C >190)    Calculated Risk for ASCVD, if applicable    The ASCVD Risk score (Men[place] JUAN Jr, et al., 2013) failed to calculate., n/a due to baseline LDL-C >190, underestimation   MARTINEZ risk with CAC = 5.5% - provided review and handouts to patient   MARTINEZ risk w/o CAC = 1.7%     Other " Significant Risk Markers, if any, deshawn all that apply   elevated coronary calcium score and Family history of premature ASCVD in first degree relative   Risk-enhancers: Famhx of premature ASCVD and Persistently elevated LDL-C >159     Goal LDL-C and nonHDL-C based on Clinic Protocol  LDL-C <100 (consider non-HDL-C <130, apoB <90), optional LDL-C <70  At goal? no    LIFESTYLE RECOMMENDATIONS FROM TODAY'S VISIT:    Smoking:  reports that she has never smoked. She has never used smokeless tobacco.   - continued complete avoidance of all tobacco products     Physical Activity: continue healthy activity to improve CV fitness, see care instructions for additional details     Weight Management and Nutrition: Dietary plan was discussed with patient at this visit including DASH, low sodium and/or as outlined in care instructions   Eating Plan: Concentrate on  Low sat/Trans fat, Low simple carb and DASH/Med Style diet    LIPID LOWERING MEDICATION MANAGEMENT:     Statin Therapy Recommendations from Today’s Visit:   - continue atorvastatin 20mg as meeting goals    Non-Statin Medications Recommendations from Today’s Visit:   Zetia: add as 2nd agent   Nexletol (avoid simva >20, prava >40): not currently indicated   Rx-grade Omega-3 FA: not currently indicated   BAS: not currently indicated     Indication for PCSK9 Inhibitor, if applicable:Not currently indicated    Supplements Recommended at this visit: - consider cholest-off      RECOMMENDATIONS FOR OTHER CV RISK FACTORS:    1) BLOOD PRESSURE MANAGEMENT:  ACC/AHA (2017) goal <130/80  Home BP at goal: yes  Office BP at goal:  yes   Plan:   - continue healthy diet, activity, weight mgmt   Monitoring:   - routine clinic-based BP measurements at least once annually   Medications: no meds indicated at this time      2) Glycemic Status: Normal    ANTITHROMBOTIC THERAPY  - potential benefit for ASA based upon high CAC cinthya if >300, continue ASA 81mg     OTHER ISSUES:    # CHRIS on  CPAP  Strongly encouraged routine adherence to reduce RV strain, improve overall fatigue symptoms, and modestly improve SBP.    - continue CPAP, defer mgmt to sleep MD    # hypothyroidism, stable  Tolerating meds, TSH normal   - continue current treatment plan, defer mgmt to PCP     Studies Ordered at Todays Visit: none   Blood Work Ordered At Today’s visit: as noted above   Follow-Up: 6mo    Josué Cooper M.D.  [facility]   [facility]   [phone]     CC:  EDGARDO Reid, Francis SOLARES M.D.

## 2021-08-21 LAB — LPA SERPL-MCNC: 56 MG/DL

## 2021-08-24 PROBLEM — E78.41 ELEVATED LIPOPROTEIN(A): Status: ACTIVE | Noted: 2021-08-24

## 2021-12-28 ENCOUNTER — TELEMEDICINE (OUTPATIENT)
Dept: SLEEP MEDICINE | Facility: MEDICAL CENTER | Age: 56
End: 2021-12-28
Payer: COMMERCIAL

## 2021-12-28 VITALS
DIASTOLIC BLOOD PRESSURE: 78 MMHG | HEART RATE: 61 BPM | OXYGEN SATURATION: 97 % | WEIGHT: 168 LBS | SYSTOLIC BLOOD PRESSURE: 132 MMHG | HEIGHT: 69 IN | BODY MASS INDEX: 24.88 KG/M2

## 2021-12-28 DIAGNOSIS — G47.33 OBSTRUCTIVE SLEEP APNEA SYNDROME: ICD-10-CM

## 2021-12-28 PROCEDURE — 99213 OFFICE O/P EST LOW 20 MIN: CPT | Mod: 95 | Performed by: NURSE PRACTITIONER

## 2021-12-28 ASSESSMENT — FIBROSIS 4 INDEX: FIB4 SCORE: 0.83

## 2021-12-28 NOTE — PROGRESS NOTES
Virtual Visit: Established Patient   This visit was conducted via Zoom using secure and encrypted videoconferencing technology.   The patient was in a private location in the state of [place].    The patient's identity was confirmed and verbal consent was obtained for this virtual visit.    Subjective:   CC:   Chief Complaint   Patient presents with   • Follow-Up     CHRIS       Zehra Adam is a 56 y.o. female presenting for evaluation and management of:    CHRIS annual follow-up.  Last seen 12/16/2020 by HERNANDEZ Senior.PMH includes A. fib on aspirin and flecainide, hyperlipidemia, chronic insomnia, COVID-19 positive on November 5, 2020.     PSG from 10/10/20 indicated mild OAS with AHI of 12.7/hr and O2 lise 85 %. However the supine AHI was 18.9/hr. AHI during rem was 34.6 and AHI while supine was 18.88. Sleep related hypoxia. There was a mean oxygen saturation of 91.0% with a minimum oxygen saturation of 80.0%. Time spent with oxygen saturations below 89% was 71.1 minutes.     Patient continues to use and benefit from CPAP therapy.  She denies any excessive daytime sleepiness, morning headaches, palpitations, concentration or memory problems.  She denies any difficulty tolerating mask or pressures.  She does use a nasal pillow and does experience some slippage with minimal mask leak.  Compliance report was reviewed and shows 97% use with an average time of 6 hours and 43 minutes and a resultant AHI of 0.2 patient currently on ResMed device at auto CPAP settings of 5 to 15 cm/H2O.  ROS       Current medicines (including changes today)  Current Outpatient Medications   Medication Sig Dispense Refill   • flecainide (TAMBOCOR) 50 MG tablet TAKE 1 TABLET BY MOUTH TWICE A  Tablet 1   • atorvastatin (LIPITOR) 20 MG Tab Take 1 tablet by mouth every evening. 90 tablet 3   • levothyroxine (SYNTHROID) 75 MCG Tab TAKE 1 TABLET BY MOUTH EVERY DAY ON AN EMPTY STOMACH     • Multiple Vitamin (MULTI-VITAMIN  "PO) Take  by mouth.     • Glucosamine HCl (GLUCOSAMINE PO) Take  by mouth.     • VITAMIN D PO Take  by mouth.     • VITAMIN E PO Take  by mouth.     • TURMERIC PO Take  by mouth.     • aspirin EC (ECOTRIN) 81 MG Tablet Delayed Response Take 1 Tab by mouth every day. 90 Tab 3     No current facility-administered medications for this visit.       Patient Active Problem List    Diagnosis Date Noted   • Elevated lipoprotein(a) 08/24/2021   • Agatston CAC score 200-399 08/20/2021   • Atherosclerosis of both carotid arteries 08/20/2021   • Long term (current) use of antithrombotics/antiplatelets 08/20/2021   • Hypothyroidism    • Obstructive sleep apnea syndrome 11/24/2020   • Healthcare maintenance 02/04/2020   • Hyperlipidemia 02/04/2020   • Atrial fibrillation (HCC) 12/26/2019        Objective:   /78   Pulse 61   Ht 1.753 m (5' 9\")   Wt 76.2 kg (168 lb)   SpO2 97%   BMI 24.81 kg/m²     Physical Exam:  Constitutional: Alert, no distress, well-groomed.  Skin: No rashes in visible areas.  Eye: Round. Conjunctiva clear, lids normal. No icterus.   ENMT: Lips pink without lesions, good dentition, moist mucous membranes. Phonation normal.  Neck: No masses, no thyromegaly. Moves freely without pain.  Respiratory: Unlabored respiratory effort, no cough or audible wheeze  Psych: Alert and oriented x3, normal affect and mood.     Assessment and Plan:   The following treatment plan was discussed:     1. Obstructive sleep apnea syndrome  - DME Mask and Supplies    Recommended continuing use of CPAP nightly for optimal benefit.  Order was placed today for mask and supplies through preferred home care.  Order will be renewed every year.  Please follow-up annually or sooner if needed.  In regards to nasal pillow slipping, I recommended using a chinstrap along with nasal pillow to ensure stability and fit.  Reach out via phone or MyChart with any questions or concerns before next appointment.  Follow-up: Return in about 1 " year (around 12/28/2022).

## 2022-02-22 ENCOUNTER — HOSPITAL ENCOUNTER (OUTPATIENT)
Dept: LAB | Facility: MEDICAL CENTER | Age: 57
End: 2022-02-22
Attending: FAMILY MEDICINE
Payer: COMMERCIAL

## 2022-02-22 DIAGNOSIS — E78.00 PRIMARY HYPERCHOLESTEROLEMIA: ICD-10-CM

## 2022-02-22 LAB
ALBUMIN SERPL BCP-MCNC: 4.8 G/DL (ref 3.2–4.9)
ALBUMIN/GLOB SERPL: 2.1 G/DL
ALP SERPL-CCNC: 93 U/L (ref 30–99)
ALT SERPL-CCNC: 31 U/L (ref 2–50)
ANION GAP SERPL CALC-SCNC: 10 MMOL/L (ref 7–16)
AST SERPL-CCNC: 24 U/L (ref 12–45)
BILIRUB SERPL-MCNC: 0.5 MG/DL (ref 0.1–1.5)
BUN SERPL-MCNC: 12 MG/DL (ref 8–22)
CALCIUM SERPL-MCNC: 9.5 MG/DL (ref 8.5–10.5)
CHLORIDE SERPL-SCNC: 103 MMOL/L (ref 96–112)
CHOLEST SERPL-MCNC: 179 MG/DL (ref 100–199)
CO2 SERPL-SCNC: 27 MMOL/L (ref 20–33)
CREAT SERPL-MCNC: 0.64 MG/DL (ref 0.5–1.4)
FASTING STATUS PATIENT QL REPORTED: NORMAL
GLOBULIN SER CALC-MCNC: 2.3 G/DL (ref 1.9–3.5)
GLUCOSE SERPL-MCNC: 87 MG/DL (ref 65–99)
HDLC SERPL-MCNC: 79 MG/DL
LDLC SERPL CALC-MCNC: 89 MG/DL
POTASSIUM SERPL-SCNC: 4.5 MMOL/L (ref 3.6–5.5)
PROT SERPL-MCNC: 7.1 G/DL (ref 6–8.2)
SODIUM SERPL-SCNC: 140 MMOL/L (ref 135–145)
TRIGL SERPL-MCNC: 57 MG/DL (ref 0–149)

## 2022-02-22 PROCEDURE — 80053 COMPREHEN METABOLIC PANEL: CPT

## 2022-02-22 PROCEDURE — 80061 LIPID PANEL: CPT

## 2022-02-22 PROCEDURE — 36415 COLL VENOUS BLD VENIPUNCTURE: CPT

## 2022-02-25 ENCOUNTER — OFFICE VISIT (OUTPATIENT)
Dept: VASCULAR LAB | Facility: MEDICAL CENTER | Age: 57
End: 2022-02-25
Attending: FAMILY MEDICINE
Payer: COMMERCIAL

## 2022-02-25 VITALS
HEART RATE: 60 BPM | SYSTOLIC BLOOD PRESSURE: 109 MMHG | BODY MASS INDEX: 26.13 KG/M2 | HEIGHT: 69 IN | DIASTOLIC BLOOD PRESSURE: 68 MMHG | WEIGHT: 176.4 LBS

## 2022-02-25 DIAGNOSIS — E78.00 PRIMARY HYPERCHOLESTEROLEMIA: ICD-10-CM

## 2022-02-25 DIAGNOSIS — E02 SUBCLINICAL IODINE-DEFICIENCY HYPOTHYROIDISM: ICD-10-CM

## 2022-02-25 DIAGNOSIS — I48.0 PAROXYSMAL ATRIAL FIBRILLATION (HCC): ICD-10-CM

## 2022-02-25 DIAGNOSIS — E78.41 ELEVATED LIPOPROTEIN(A): ICD-10-CM

## 2022-02-25 DIAGNOSIS — G47.33 OBSTRUCTIVE SLEEP APNEA SYNDROME: ICD-10-CM

## 2022-02-25 DIAGNOSIS — Z79.02 LONG TERM (CURRENT) USE OF ANTITHROMBOTICS/ANTIPLATELETS: ICD-10-CM

## 2022-02-25 DIAGNOSIS — R93.1 AGATSTON CAC SCORE 200-399: ICD-10-CM

## 2022-02-25 DIAGNOSIS — I65.23 ATHEROSCLEROSIS OF BOTH CAROTID ARTERIES: ICD-10-CM

## 2022-02-25 DIAGNOSIS — D68.69 SECONDARY HYPERCOAGULABLE STATE (HCC): ICD-10-CM

## 2022-02-25 DIAGNOSIS — I25.10 CORONARY ARTERY CALCIFICATION SEEN ON CT SCAN: ICD-10-CM

## 2022-02-25 PROCEDURE — 99212 OFFICE O/P EST SF 10 MIN: CPT

## 2022-02-25 PROCEDURE — 99214 OFFICE O/P EST MOD 30 MIN: CPT | Performed by: FAMILY MEDICINE

## 2022-02-25 ASSESSMENT — ENCOUNTER SYMPTOMS
BRUISES/BLEEDS EASILY: 0
PALPITATIONS: 0
CLAUDICATION: 0
FEVER: 0
ORTHOPNEA: 0
COUGH: 0
NAUSEA: 0
WEAKNESS: 0
MYALGIAS: 0
CHILLS: 0

## 2022-02-25 ASSESSMENT — FIBROSIS 4 INDEX: FIB4 SCORE: 0.86

## 2022-02-25 NOTE — PROGRESS NOTES
[facility]   Date of Service: 02/25/22     Zehra Adam has been referred for evaluation and management of dyslipidemia    Referral Source: Francis Yates M.D.     Subjective       HPI  Current/interval symptoms or concerns:  Reports fluttery in the chest last night with anxiety.  Reported Afib - unclear length, no associated sx.  Watch showed return to NSR.  Currently stable this AM.  Reports no change in lifestyle or use of etoh.  No residual issues.   Has not contacted Dr. Yates's office.   Using ASA, no focal neuro s/s    Change in weight: reduced  BMI Readings from Last 5 Encounters:   02/25/22 26.05 kg/m²   12/28/21 24.81 kg/m²   08/20/21 24.51 kg/m²   07/09/21 24.22 kg/m²   04/27/21 23.98 kg/m²     Exercise habits: moderate regular exercise program  Dietary patterns: low fat, low carbohydrate, reduced red meat   Reported barriers to care: none     History of ASCVD: CAD as evidenced by high CAC score   High-risk conditions: None    Secondary causes of dyslipidemia:  Endocrine/Hypothyroidism:  Known subclinical hypothyroidism, opted for tx, tolerating meds, TSH wnl   Liver disease: mild high ALT in past, currently stable 6/2021   Other Established (non-atherosclerotic) Vascular Disease, if Present: None  Age at Initial Diagnosis of Dyslipidemia: >5yrs    Current Prescription Lipid Lowering Medications - including dose:   Statin: atorva 20mg since 5/2021   Non-Statin: None  Current Lipid Lowering and Related Supplements: None  Any Current Side Effects Potentially Related to Lipid Lowering therapy? No  Current Adherence to Lipid Lowering Therapies   Complete  Previously Attempted Interventions for Lipids - including outcome  Statin: None   Outcome: N/A  Non-Statin: None  Outcome: N/A  Any Previous History of Statin Intolerance? No  Baseline Lipids Prior to Treatment:      Ref. Range 3/2/2016 07:33   Cholesterol,Tot Latest Ref Range: 100 - 199 mg/dL 284 (H)   Triglycerides  "Latest Ref Range: 0 - 149 mg/dL 86   HDL Latest Ref Range: >=40 mg/dL 71   LDL Latest Ref Range: <100 mg/dL 196 (H)     Anticoagulation/antiplats: Yes, Details: ASA 81mg, no bleeding noted     HTN:  No dx or meds   Adherence to current HTN meds: compliant all of the time      CURRENT MEDICATIONS:   Current Outpatient Medications:   •  flecainide, TAKE 1 TABLET BY MOUTH TWICE A DAY, Taking  •  atorvastatin, 20 mg, Oral, Q EVENING, Taking  •  levothyroxine, TAKE 1 TABLET BY MOUTH EVERY DAY ON AN EMPTY STOMACH, Taking  •  Multiple Vitamin (MULTI-VITAMIN PO), Take  by mouth., Taking  •  Glucosamine HCl (GLUCOSAMINE PO), Take  by mouth., Taking  •  VITAMIN D PO, Take  by mouth., Taking  •  VITAMIN E PO, Take  by mouth., Taking  •  TURMERIC PO, Take  by mouth., Taking  •  aspirin EC, 81 mg, Oral, DAILY, Taking    SOCIAL HISTORY   Social History     Tobacco Use   • Smoking status: Never Smoker   • Smokeless tobacco: Never Used   Vaping Use   • Vaping Use: Never used   Substance Use Topics   • Alcohol use: Yes     Alcohol/week: 0.6 oz     Types: 1 Glasses of wine per week     Comment: occasional 2 drinks   • Drug use: No     Review of Systems   Constitutional: Negative for chills and fever.   Respiratory: Negative for cough.    Cardiovascular: Negative for chest pain, palpitations, orthopnea, claudication and leg swelling.   Gastrointestinal: Negative for nausea.   Musculoskeletal: Negative for myalgias.   Neurological: Negative for weakness.   Endo/Heme/Allergies: Does not bruise/bleed easily.          Objective       Vitals:    02/25/22 0821   BP: 109/68   BP Location: Left arm   Patient Position: Sitting   BP Cuff Size: Adult   Pulse: 60   Weight: 80 kg (176 lb 6.4 oz)   Height: 1.753 m (5' 9\")      BP Readings from Last 5 Encounters:   02/25/22 109/68   12/28/21 132/78   08/20/21 120/76   07/09/21 100/65   04/27/21 106/59     Physical Exam  Constitutional:       General: She is not in acute distress.     Appearance: She " is well-developed. She is not diaphoretic.   HENT:      Head: Normocephalic.   Eyes:      Conjunctiva/sclera: Conjunctivae normal.   Pulmonary:      Effort: Pulmonary effort is normal. No respiratory distress.   Skin:     Coloration: Skin is not pale.      Findings: No rash.   Neurological:      Mental Status: She is alert and oriented to person, place, and time.      Cranial Nerves: No cranial nerve deficit.   Psychiatric:         Behavior: Behavior normal.         DATA REVIEW:  Most Recent Lipid Panel:   Lab Results   Component Value Date    CHOLSTRLTOT 179 2022    TRIGLYCERIDE 57 2022    HDL 79 2022    LDL 89 2022     Lab Results   Component Value Date/Time    LIPOPROTA 56 (H) 2021 07:07 AM      No results found for: APOB      Other Pertinent Blood Work:   Lab Results   Component Value Date    SODIUM 140 2022    POTASSIUM 4.5 2022    CHLORIDE 103 2022    CO2 27 2022    ANION 10.0 2022    GLUCOSE 87 2022    BUN 12 2022    CREATININE 0.64 2022    CALCIUM 9.5 2022    ASTSGOT 24 2022    ALTSGPT 31 2022    ALKPHOSPHAT 93 2022    TBILIRUBIN 0.5 2022    ALBUMIN 4.8 2022    AGRATIO 2.1 2022    LIPOPROTA 56 (H) 2021    TSHULTRASEN 1.840 2021     VASCULAR IMAGING:     Last EKG:   Results for orders placed or performed in visit on 19   EKG   Result Value Ref Range    Report       [facility]    Test Date:  2019  Pt Name:    ARTEMIO KIM               Department: [facility]  MRN:        [id number]                      Room:  Gender:     Female                       Technician:   :        1965                   Requested By:ALISA MAYA  Order #:    [id number]                    Reading MD: Clarence Etienne MD    Measurements  Intervals                                Axis  Rate:       57                           P:          2  OK:         145                           QRS:        -13  QRSD:       102                          T:          19  QT:         436  QTc:        425    Interpretive Statements  SLOW SINUS ARRHYTHMIA, RATE  49- 66  No previous ECG available for comparison  Electronically Signed On 12- 17:16:21 PST by Clarence Etienne MD       Echo 1/2020  Normal echocardiogram.   No prior study is available for comparison.     Carotid 5/2020  1.  There is no significant atherosclerotic plaque.   2.  There is no evidence of carotid occlusion.   3. Vertebral arteries demonstrate antegrade flow.   4. Diameter reduction in the internal carotid arteries: less than 50%. There is no hemodynamically significant stenosis.   5. Technologist indicates real-time evaluation, that there appear to be findings suggestive of incomplete right subclavian steal.    CAC 5/2021  Coronary calcification:  LMA - 0.0  LCX - 53.6  LAD - 186.0  RCA - 4.3  PDA - 0.0   Total Calcium Score: 243.9   Percentile: Calcium score is above the 90th percentile for the patient's age and sex.   Other findings:  Heart: Normal size.  Lungs: Clear.  Mediastinum: Normal.  Upper abdomen: Normal.               ASSESSMENT AND PLAN  1. Primary hypercholesterolemia  Comp Metabolic Panel    Lipid Profile   2. Paroxysmal atrial fibrillation (HCC)     3. Agatston CAC score 200-399     4. Long term (current) use of antithrombotics/antiplatelets     5. Coronary artery calcification seen on CT scan     6. Atherosclerosis of both carotid arteries     7. Obstructive sleep apnea syndrome     8. Elevated lipoprotein(a)     9. Subclinical iodine-deficiency hypothyroidism     10. Secondary hypercoagulable state (HCC)       Patient Type, check all that apply: Primary Prevention    Established Atherosclerotic Cardiovascular Disease (ASCVD): yes    1) subclinical CAD, evidenced by  (97th%ile for age/gender)  Distribution in 3 vessels, sparing LMA  Low risk for near-term CVD however 97th%ile indicates poor  "trajectory of risk of future ASCVD  - use secondary med mgmt goals due to high CAC    - recommended for ASA 81mg due to PAF as well     2) PAF, stable, rhythm-controlled - episode last PM   NICKI-VASC = 1, no indications for OAC   - continue meds with antiplat, sent staff msg to Dr. Yates (EP) for f/u and advised pt to contact his office for f/u   - ED precautions if worsening sx, recurrent episodes or focal neuro s/s    3) carotid art atherosclerosis with possible \"incomplete\" R subclav steal   No hx of TIA/CVA or symptoms, no RUE claudcation  - no additional imaging, consider if worsening RUE claudication or symptoms  - continue secondary prevention med mgmt     Other Established (non-atherosclerotic) Vascular Disease, if Present:None    Evidence of Heterozygous Familial Hypercholesterolemia (FH):   Yes   - baseline LDL-C >190  - fhx of HLD and heart disease as noted - to obtain pre-tx LDL's from family if possible     FH genotyping:  Reviewed as option as monogenic FH would require more intensive therapy and has downstream increased ASCVD risk compared to polygenic vs other primary HLD    - she will call if wishes to proceed with testing via [employer], or we can address at next visit     ACC/AHA Indication for Statin Therapy, deshawn all that apply:  LDL-C at baseline >190 mg/dl: Indication for Moderate intensity statin, consider high-intensity based upon existing high CAC and established carotid art athero  Primary Severe HLD / FH (baseline LDL-C >190)    Calculated Risk for ASCVD, if applicable    The ASCVD Risk score (Rao JUAN Jr, et al., 2013) failed to calculate., n/a due to baseline LDL-C >190, underestimation   MARTINEZ risk with CAC = 5.5% - underestimate due to LDL >190  MARTINEZ risk w/o CAC = 1.7%     Other Significant Risk Markers, if any, deshawn all that apply   elevated coronary calcium score and Family history of premature ASCVD in first degree relative   Risk-enhancers: Famhx of premature ASCVD and Persistently " elevated LDL-C >159     Goal LDL-C and nonHDL-C based on Clinic Protocol  LDL-C <100 (consider non-HDL-C <130, apoB <90), optional LDL-C <70  At goal? Yes for LDL <100 2/2022 ,    LIFESTYLE RECOMMENDATIONS FROM TODAY'S VISIT:    Smoking:  reports that she has never smoked. She has never used smokeless tobacco.   - continued complete avoidance of all tobacco products     Physical Activity: continue healthy activity to improve CV fitness, see care instructions for additional details     Weight Management and Nutrition: Dietary plan was discussed with patient at this visit including DASH, low sodium and/or as outlined in care instructions   Eating Plan: Concentrate on  Low sat/Trans fat, Low simple carb and DASH/Med Style diet    LIPID LOWERING MEDICATION MANAGEMENT:     Statin Therapy Recommendations from Today’s Visit:   - continue atorvastatin 20mg daily   - consider 40mg if LDL remains elevated at next labs     Non-Statin Medications Recommendations from Today’s Visit:   Zetia: add as 2nd agent   Nexletol (avoid simva >20, prava >40): not currently indicated   Rx-grade Omega-3 FA: not currently indicated   BAS: not currently indicated     Indication for PCSK9 Inhibitor, if applicable:Not currently indicated    Supplements Recommended at this visit: - consider cholest-off      RECOMMENDATIONS FOR OTHER CV RISK FACTORS:    1) BLOOD PRESSURE MANAGEMENT:  ACC/AHA (2017) goal <130/80  Home BP at goal: yes  Office BP at goal:  yes   Plan:   - continue healthy diet, activity, weight mgmt   Monitoring:   - routine clinic-based BP measurements at least once annually   Medications: no meds indicated at this time      2) Glycemic Status: Normal    ANTITHROMBOTIC THERAPY  - continue ASa 81mg daily, CAC >100 and PAF     OTHER ISSUES:    # CHRIS on CPAP  Strongly encouraged routine adherence to reduce RV strain, improve overall fatigue symptoms, and modestly improve SBP.    - continue CPAP, defer mgmt to sleep MD    #  hypothyroidism, stable  Tolerating meds, TSH normal   - continue current treatment plan, defer mgmt to PCP     Studies Ordered at Todays Visit: none   Blood Work Ordered At Today’s visit: as noted above   Follow-Up:  With EP specialist, Aug with [facility] med     Josué Cooper M.D.  [facility]   [facility]   [phone]     CC:  EDGARDO Reid, Francis SOLARES M.D.

## 2022-03-03 ENCOUNTER — TELEPHONE (OUTPATIENT)
Dept: CARDIOLOGY | Facility: MEDICAL CENTER | Age: 57
End: 2022-03-03
Payer: COMMERCIAL

## 2022-03-03 NOTE — TELEPHONE ENCOUNTER
DS    Pt called stating she was experiencing muscle cramping pain, slight pain and tightness in her chest, lightheadedness and dizziness. EMTs were called and they evaluated but she was feeling fine by the time they got there with vitals being normal. This lasted between 5 and 10 minutes.     Best Contact Number: [phone]

## 2022-03-03 NOTE — TELEPHONE ENCOUNTER
Back of both arms started cramping and then radiated to chest. Pt felt dizzy and laid down. EMT took vitals bp normal range but slightly elevated for her 147/82 usually 110/70. 12 lead ECG didn't show anything significant per EMT. Pt didn't fell like she was in afib.     Pt is stressed with work and was at work when this was happening.    Pt s/o believes it was vaso-vegal related.  that pt has a hx of vasovegal so possibly pain scared her and exacerbated the situation.     Offered f/u appt with ds. At this point they decline and would like to see how the next few days go. Encouraged to rest, hydrate and decrease stress if she can.     Pt to call back if she has another episode.

## 2022-03-04 NOTE — TELEPHONE ENCOUNTER
S/w pt she really feels like it was more of a panic attack/ anxiety related. For now she would like to forgo the testing but will let us know if she changes her mind

## 2022-04-29 DIAGNOSIS — E78.2 MIXED HYPERLIPIDEMIA: ICD-10-CM

## 2022-05-03 RX ORDER — ATORVASTATIN CALCIUM 20 MG/1
TABLET, FILM COATED ORAL
Qty: 90 TABLET | Refills: 3 | Status: SHIPPED | OUTPATIENT
Start: 2022-05-03 | End: 2023-05-09 | Stop reason: SDUPTHER

## 2022-06-11 DIAGNOSIS — I48.0 PAROXYSMAL ATRIAL FIBRILLATION (HCC): ICD-10-CM

## 2022-06-14 RX ORDER — FLECAINIDE ACETATE 50 MG/1
TABLET ORAL
Qty: 180 TABLET | Refills: 0 | Status: SHIPPED | OUTPATIENT
Start: 2022-06-14 | End: 2022-06-23 | Stop reason: SDUPTHER

## 2022-06-23 ENCOUNTER — PATIENT MESSAGE (OUTPATIENT)
Dept: CARDIOLOGY | Facility: MEDICAL CENTER | Age: 57
End: 2022-06-23

## 2022-06-23 ENCOUNTER — TELEMEDICINE (OUTPATIENT)
Dept: CARDIOLOGY | Facility: MEDICAL CENTER | Age: 57
End: 2022-06-23
Payer: COMMERCIAL

## 2022-06-23 VITALS
HEIGHT: 69 IN | OXYGEN SATURATION: 98 % | WEIGHT: 173 LBS | SYSTOLIC BLOOD PRESSURE: 105 MMHG | HEART RATE: 64 BPM | BODY MASS INDEX: 25.62 KG/M2 | DIASTOLIC BLOOD PRESSURE: 70 MMHG

## 2022-06-23 DIAGNOSIS — R93.1 AGATSTON CAC SCORE 200-399: ICD-10-CM

## 2022-06-23 DIAGNOSIS — I25.10 CORONARY ARTERY CALCIFICATION SEEN ON CT SCAN: ICD-10-CM

## 2022-06-23 DIAGNOSIS — I48.0 PAROXYSMAL ATRIAL FIBRILLATION (HCC): ICD-10-CM

## 2022-06-23 DIAGNOSIS — Z79.899 HIGH RISK MEDICATION USE: ICD-10-CM

## 2022-06-23 DIAGNOSIS — E78.2 MIXED HYPERLIPIDEMIA: ICD-10-CM

## 2022-06-23 PROCEDURE — 99214 OFFICE O/P EST MOD 30 MIN: CPT | Mod: 95 | Performed by: NURSE PRACTITIONER

## 2022-06-23 RX ORDER — FLECAINIDE ACETATE 50 MG/1
50 TABLET ORAL 2 TIMES DAILY
Qty: 180 TABLET | Refills: 0 | Status: SHIPPED | OUTPATIENT
Start: 2022-06-23 | End: 2022-11-17 | Stop reason: SDUPTHER

## 2022-06-23 ASSESSMENT — ENCOUNTER SYMPTOMS
WEAKNESS: 0
DIZZINESS: 0
PND: 0
CLAUDICATION: 0
ORTHOPNEA: 0
SHORTNESS OF BREATH: 0
PALPITATIONS: 0
WEIGHT LOSS: 0

## 2022-06-23 ASSESSMENT — FIBROSIS 4 INDEX: FIB4 SCORE: 0.88

## 2022-06-23 NOTE — PROGRESS NOTES
Chief Complaint   Patient presents with   • Atrial Fibrillation     F/V Dx: Atrial fibrillation, unspecified type (HCC)     This evaluation was conducted via Zoom using secure and encrypted videoconferencing technology. The patient was in their home in the [facility].      The patient's identity was confirmed and verbal consent was obtained for this virtual visit.      Subjective     Zehra Adam is a 57 y.o. female who presents today    PMH pertinent for coronary artery calcification, mild to moderate CHRIS, hypothyroidism and pAF controlled with Flecainide.     Patient was seen by Dr. Yates on 4/27/2021. She was doing well and was advised to follow up in one year.     Today patient states that she is feeling well. Denies having any significant concerns or complaints to discuss today. Primarily needs her Flecainide refilled.     Past Medical History:   Diagnosis Date   • Atrial fibrillation (HCC)    • Hyperlipidemia    • Hypothyroidism    • Thyroid disease      History reviewed. No pertinent surgical history.  Family History   Problem Relation Age of Onset   • Thyroid Mother    • Heart Disease Mother         Afib, TAVR   • Hypertension Mother    • Other Mother    • Diabetes Father    • Thyroid Sister    • Heart Disease Brother    • Sleep Apnea Brother    • Hyperlipidemia Brother    • Heart Attack Maternal Grandmother    • Heart Attack Brother         ? MI (69)   • Arrythmia Brother      Social History     Socioeconomic History   • Marital status:      Spouse name: Not on file   • Number of children: Not on file   • Years of education: Not on file   • Highest education level: Not on file   Occupational History   • Not on file   Tobacco Use   • Smoking status: Never Smoker   • Smokeless tobacco: Never Used   Vaping Use   • Vaping Use: Never used   Substance and Sexual Activity   • Alcohol use: Yes     Alcohol/week: 0.6 oz     Types: 1 Glasses of wine per week     Comment: occasional 2 drinks  "  • Drug use: No   • Sexual activity: Not on file   Other Topics Concern   • Not on file   Social History Narrative    ** Merged History Encounter **          Social Determinants of Health     Financial Resource Strain: Not on file   Food Insecurity: Not on file   Transportation Needs: Not on file   Physical Activity: Not on file   Stress: Not on file   Social Connections: Not on file   Intimate Partner Violence: Not on file   Housing Stability: Not on file     Allergies   Allergen Reactions   • Codeine      Outpatient Encounter Medications as of 6/23/2022   Medication Sig Dispense Refill   • flecainide (TAMBOCOR) 50 MG tablet Take 1 Tablet by mouth 2 times a day. 180 Tablet 0   • atorvastatin (LIPITOR) 20 MG Tab TAKE 1 TABLET BY MOUTH EVERY DAY IN THE EVENING 90 Tablet 3   • levothyroxine (SYNTHROID) 75 MCG Tab TAKE 1 TABLET BY MOUTH EVERY DAY ON AN EMPTY STOMACH     • Multiple Vitamin (MULTI-VITAMIN PO) Take  by mouth.     • Glucosamine HCl (GLUCOSAMINE PO) Take  by mouth.     • VITAMIN D PO Take  by mouth.     • VITAMIN E PO Take  by mouth.     • TURMERIC PO Take  by mouth.     • aspirin EC (ECOTRIN) 81 MG Tablet Delayed Response Take 1 Tab by mouth every day. 90 Tab 3   • [DISCONTINUED] flecainide (TAMBOCOR) 50 MG tablet TAKE 1 TABLET BY MOUTH TWICE A  Tablet 0     No facility-administered encounter medications on file as of 6/23/2022.     Review of Systems   Constitutional: Negative for malaise/fatigue and weight loss.   Respiratory: Negative for shortness of breath.    Cardiovascular: Negative for chest pain, palpitations, orthopnea, claudication, leg swelling and PND.   Neurological: Negative for dizziness and weakness.   All other systems reviewed and are negative.             Objective     /70 (BP Location: Right arm, Patient Position: Sitting, BP Cuff Size: Adult)   Pulse 64   Ht 1.753 m (5' 9\")   Wt 78.5 kg (173 lb)   SpO2 98%   BMI 25.55 kg/m²     Physical Exam           Assessment & " Plan     1. Paroxysmal atrial fibrillation (HCC)  flecainide (TAMBOCOR) 50 MG tablet   2. Coronary artery calcification seen on CT scan     3. Agatston CAC score 200-399     4. Mixed hyperlipidemia     5. High risk medication use         Medical Decision Making: Today's Assessment/Status/Plan:     Patient is overall dong well. Denies any concerning cardiac symptoms or recurrence of pAF. She has not had an EKG completed since 2019, I do recommend that she have one done prior to more refills of Flecainide in the future. EKG apt arranged as below. TSH WNL. BP well controlled.     Will defer other routine testing for patients that are on Flecainide long term such as TTE and stress testing to Dr. Yates whom she will follow up with in one year if EKG is normal.     Future Appointments   Date Time Provider Department Center   7/15/2022  1:15 PM NON PROVIDER-[facility] None   8/23/2022  8:20 AM VASCULAR NURSE PRACTITIONER [facility] None

## 2022-07-15 ENCOUNTER — NON-PROVIDER VISIT (OUTPATIENT)
Dept: CARDIOLOGY | Facility: MEDICAL CENTER | Age: 57
End: 2022-07-15
Payer: COMMERCIAL

## 2022-07-15 ENCOUNTER — TELEPHONE (OUTPATIENT)
Dept: CARDIOLOGY | Facility: MEDICAL CENTER | Age: 57
End: 2022-07-15

## 2022-07-15 DIAGNOSIS — I48.0 PAROXYSMAL ATRIAL FIBRILLATION (HCC): ICD-10-CM

## 2022-07-15 PROCEDURE — 93000 ELECTROCARDIOGRAM COMPLETE: CPT | Performed by: INTERNAL MEDICINE

## 2022-07-15 NOTE — PROGRESS NOTES
Patient was here today for EKG per JS. EKG performed and transferred into patients chart. Paper copy of EKG given to Caity GRIFFIN for JS. RN to come into room with pt.    Chart routed to RN.

## 2022-07-15 NOTE — TELEPHONE ENCOUNTER
Pt came in for EKG check today showing SB with HR 56, QTc 421. Pt had TM visit with JS on 06/23 and was advised to come in for EKG as previous was done back in 2019. Pt has no current complaints or symptoms. She is continuing flecainide 50mg BID at this time. Advised will reach out to DS per care team if any new recommendations. Advised pt we won't call her unless new recommendations received. She verbalized understanding and was appreciative. Pt left office in stable condition.    To Dr. Yates - mutual pt with [facility], please review EKG and advise if any new recommendations to POC or flecainide. Thank you!

## 2022-07-25 ENCOUNTER — PATIENT MESSAGE (OUTPATIENT)
Dept: CARDIOLOGY | Facility: MEDICAL CENTER | Age: 57
End: 2022-07-25
Payer: COMMERCIAL

## 2022-07-29 LAB — EKG IMPRESSION: NORMAL

## 2022-08-23 ENCOUNTER — OFFICE VISIT (OUTPATIENT)
Dept: VASCULAR LAB | Facility: MEDICAL CENTER | Age: 57
End: 2022-08-23
Attending: NURSE PRACTITIONER
Payer: COMMERCIAL

## 2022-08-23 ENCOUNTER — HOSPITAL ENCOUNTER (OUTPATIENT)
Dept: RADIOLOGY | Facility: MEDICAL CENTER | Age: 57
End: 2022-08-23
Attending: NURSE PRACTITIONER
Payer: COMMERCIAL

## 2022-08-23 VITALS
WEIGHT: 177.4 LBS | HEIGHT: 69 IN | DIASTOLIC BLOOD PRESSURE: 72 MMHG | SYSTOLIC BLOOD PRESSURE: 118 MMHG | HEART RATE: 61 BPM | BODY MASS INDEX: 26.28 KG/M2

## 2022-08-23 DIAGNOSIS — I48.0 PAROXYSMAL ATRIAL FIBRILLATION (HCC): ICD-10-CM

## 2022-08-23 DIAGNOSIS — R93.1 AGATSTON CAC SCORE 200-399: ICD-10-CM

## 2022-08-23 DIAGNOSIS — E78.2 MIXED HYPERLIPIDEMIA: ICD-10-CM

## 2022-08-23 DIAGNOSIS — Z79.02 LONG TERM (CURRENT) USE OF ANTITHROMBOTICS/ANTIPLATELETS: ICD-10-CM

## 2022-08-23 DIAGNOSIS — M79.89 LIMB SWELLING: ICD-10-CM

## 2022-08-23 DIAGNOSIS — E78.41 ELEVATED LIPOPROTEIN(A): ICD-10-CM

## 2022-08-23 DIAGNOSIS — E02 SUBCLINICAL IODINE-DEFICIENCY HYPOTHYROIDISM: ICD-10-CM

## 2022-08-23 PROCEDURE — 99212 OFFICE O/P EST SF 10 MIN: CPT

## 2022-08-23 PROCEDURE — 93971 EXTREMITY STUDY: CPT | Mod: RT

## 2022-08-23 PROCEDURE — 99214 OFFICE O/P EST MOD 30 MIN: CPT | Performed by: NURSE PRACTITIONER

## 2022-08-23 ASSESSMENT — ENCOUNTER SYMPTOMS
WEAKNESS: 0
CHILLS: 0
COUGH: 0
CLAUDICATION: 0
PALPITATIONS: 0
MYALGIAS: 0
BRUISES/BLEEDS EASILY: 0
FEVER: 0
NAUSEA: 0
ORTHOPNEA: 0

## 2022-08-23 ASSESSMENT — FIBROSIS 4 INDEX: FIB4 SCORE: 0.88

## 2022-08-23 NOTE — PROGRESS NOTES
[facility]   Date of Service: 08/23/22     Zehra Adam has been referred for evaluation and management of dyslipidemia    Referral Source: Francis Yates M.D.     Subjective       HPI  Current/interval symptoms or concerns:   Reported Afib - short burst in Feb unclear length, no associated sx. Has had several occurrence since then. Is in contact with Dr Yates's office.  Last reported was  7/25/22. Watch showed return to NSR.  Currently stable this AM.  Reports no change in lifestyle or use of etoh.  Has had some dehydration and covid related incidence.On Flecainide 50 mg can go up to 75 mg per Dr. Yates's note if frequent breakthroughs. No residual issues since July 25  Using ASA, no focal neuro s/s. Sprained her ankle has some leg swelling and calf pain.    Change in weight:  reduced  BMI Readings from Last 5 Encounters:   06/23/22 25.55 kg/m²   02/25/22 26.05 kg/m²   12/28/21 24.81 kg/m²   08/20/21 24.51 kg/m²   07/09/21 24.22 kg/m²     Exercise habits: moderate regular exercise program  Dietary patterns: low fat, low carbohydrate, reduced red meat   Reported barriers to care: none     History of ASCVD: CAD as evidenced by high CAC score   High-risk conditions: None    Secondary causes of dyslipidemia:  Endocrine/Hypothyroidism:  Known subclinical hypothyroidism, opted for tx, tolerating meds, TSH wnl   Liver disease: mild high ALT in past, currently stable 6/2021   Other Established (non-atherosclerotic) Vascular Disease, if Present: None  Age at Initial Diagnosis of Dyslipidemia: >5yrs    Current Prescription Lipid Lowering Medications - including dose:   Statin: atorva 20mg since 5/2021   Non-Statin: None  Current Lipid Lowering and Related Supplements: None  Any Current Side Effects Potentially Related to Lipid Lowering therapy? No  Current Adherence to Lipid Lowering Therapies   Complete  Previously Attempted Interventions for Lipids - including outcome  Statin: None    Outcome: N/A  Non-Statin: None  Outcome: N/A  Any Previous History of Statin Intolerance? No  Baseline Lipids Prior to Treatment:      Ref. Range 3/2/2016 07:33   Cholesterol,Tot Latest Ref Range: 100 - 199 mg/dL 284 (H)   Triglycerides Latest Ref Range: 0 - 149 mg/dL 86   HDL Latest Ref Range: >=40 mg/dL 71   LDL Latest Ref Range: <100 mg/dL 196 (H)     Anticoagulation/antiplats: Yes, Details: ASA 81mg , no bleeding noted     HTN:  No dx or meds   Adherence to current HTN meds: compliant all of the time      CURRENT MEDICATIONS:   Current Outpatient Medications:     flecainide, 50 mg, Oral, BID    atorvastatin, TAKE 1 TABLET BY MOUTH EVERY DAY IN THE EVENING    levothyroxine, TAKE 1 TABLET BY MOUTH EVERY DAY ON AN EMPTY STOMACH    Multiple Vitamin (MULTI-VITAMIN PO), Take  by mouth.    Glucosamine HCl (GLUCOSAMINE PO), Take  by mouth.    VITAMIN D PO, Take  by mouth.    VITAMIN E PO, Take  by mouth.    TURMERIC PO, Take  by mouth.    aspirin EC, 81 mg, Oral, DAILY    SOCIAL HISTORY   Social History     Tobacco Use    Smoking status: Never    Smokeless tobacco: Never   Vaping Use    Vaping Use: Never used   Substance Use Topics    Alcohol use: Yes     Alcohol/week: 0.6 oz     Types: 1 Glasses of wine per week     Comment: occasional 2 drinks    Drug use: No     Review of Systems   Constitutional:  Negative for chills and fever.   Respiratory:  Negative for cough.    Cardiovascular:  Negative for chest pain, palpitations, orthopnea, claudication and leg swelling.   Gastrointestinal:  Negative for nausea.   Musculoskeletal:  Negative for myalgias.   Neurological:  Negative for weakness.   Endo/Heme/Allergies:  Does not bruise/bleed easily.        Objective       There were no vitals filed for this visit.     BP Readings from Last 5 Encounters:   06/23/22 105/70   02/25/22 109/68   12/28/21 132/78   08/20/21 120/76   07/09/21 100/65     Physical Exam  Constitutional:       General: She is not in acute distress.      Appearance: She is well-developed. She is not diaphoretic.   HENT:      Head: Normocephalic.   Eyes:      Conjunctiva/sclera: Conjunctivae normal.   Pulmonary:      Effort: Pulmonary effort is normal. No respiratory distress.   Musculoskeletal:      Right lower leg: Edema present.   Skin:     Coloration: Skin is not pale.      Findings: No rash.   Neurological:      Mental Status: She is alert and oriented to person, place, and time.      Cranial Nerves: No cranial nerve deficit.   Psychiatric:         Behavior: Behavior normal.       DATA REVIEW:  Most Recent Lipid Panel:   Lab Results   Component Value Date    CHOLSTRLTOT 179 2022    TRIGLYCERIDE 57 2022    HDL 79 2022    LDL 89 2022     Lab Results   Component Value Date/Time    LIPOPROTA 56 (H) 2021 07:07 AM      No results found for: APOB      Other Pertinent Blood Work:   Lab Results   Component Value Date    SODIUM 140 2022    POTASSIUM 4.5 2022    CHLORIDE 103 2022    CO2 27 2022    ANION 10.0 2022    GLUCOSE 87 2022    BUN 12 2022    CREATININE 0.64 2022    CALCIUM 9.5 2022    ASTSGOT 24 2022    ALTSGPT 31 2022    ALKPHOSPHAT 93 2022    TBILIRUBIN 0.5 2022    ALBUMIN 4.8 2022    AGRATIO 2.1 2022    LIPOPROTA 56 (H) 2021    TSHULTRASEN 1.840 2021     VASCULAR IMAGING:     Last EKG:   Results for orders placed or performed in visit on 07/15/22   EKG   Result Value Ref Range    Report       [facility]    Test Date:  2022-07-15  Pt Name:    ARTEMIO KIM               Department: [facility]  MRN:        [id number]                      Room:  Gender:     Female                       Technician: LINO  :        1965                   Requested By:KEDAR KRUSE  Order #:    [id number]                    Reading MD: Francis Yates MD    Measurements  Intervals                                Axis  Rate:       56                            P:          37  MN:         144                          QRS:        37  QRSD:       108                          T:          50  QT:         436  QTc:        421    Interpretive Statements  SINUS BRADYCARDIA  BORDERLINE INTRAVENTRICULAR CONDUCTION DELAY  BASELINE WANDER IN LEAD(S) V2,V6  Compared to ECG 12/26/2019 14:35:02  Unchanged  Electronically Signed On 7- 9:28:32 PDT by Francis Yates MD       Echo 1/2020  Normal echocardiogram.   No prior study is available for comparison.     Carotid 5/2020  1.  There is no significant atherosclerotic plaque.   2.  There is no evidence of carotid occlusion.   3. Vertebral arteries demonstrate antegrade flow.   4. Diameter reduction in the internal carotid arteries: less than 50%. There is no hemodynamically significant stenosis.   5. Technologist indicates real-time evaluation, that there appear to be findings suggestive of incomplete right subclavian steal.    CAC 5/2021  Coronary calcification:  LMA - 0.0  LCX - 53.6  LAD - 186.0  RCA - 4.3  PDA - 0.0   Total Calcium Score: 243.9   Percentile: Calcium score is above the 90th percentile for the patient's age and sex.   Other findings:  Heart: Normal size.  Lungs: Clear.  Mediastinum: Normal.  Upper abdomen: Normal.               ASSESSMENT AND PLAN  1. Agatston CAC score 200-399        2. Paroxysmal atrial fibrillation (HCC)        3. Long term (current) use of antithrombotics/antiplatelets        4. Mixed hyperlipidemia        5. Subclinical iodine-deficiency hypothyroidism        6. Elevated lipoprotein(a)          Patient Type, check all that apply: Primary Prevention    Established Atherosclerotic Cardiovascular Disease (ASCVD): yes    1) subclinical CAD, evidenced by  (97th%ile for age/gender)  Distribution in 3 vessels, sparing LMA  Low risk for near-term CVD however 97th%ile indicates poor trajectory of risk of future ASCVD  - use secondary med mgmt goals due to high CAC    -  "recommended for ASA 81mg due to PAF as well     2) PAF, stable, rhythm-controlled - episode in Feb  NICKI-VASC = 1, no indications for OAC   - continue meds with antiplat, fu with Dr. Yates (EP)    - ED precautions if worsening sx, recurrent episodes or focal neuro s/s     3) carotid art atherosclerosis with possible \"incomplete\" R subclav steal   No hx of TIA/CVA or symptoms, no RUE claudcation  - no additional imaging, consider if worsening RUE claudication or symptoms  - continue secondary prevention med mgmt     Other Established (non-atherosclerotic) Vascular Disease, if Present:None    Evidence of Heterozygous Familial Hypercholesterolemia (FH):   Yes   - baseline LDL-C >190  - fhx of HLD and heart disease as noted - to obtain pre-tx LDL's from family if possible     FH genotyping:  Reviewed as option as monogenic FH would require more intensive therapy and has downstream increased ASCVD risk compared to polygenic vs other primary HLD    - she will call if wishes to proceed with testing via [facility], or we can address at next visit   [facility]-Education give on free genetic testing available for all adults over 18 yrs of age.   -Pt took Brochure and will think about the testing        ACC/AHA Indication for Statin Therapy, deshawn all that apply:  LDL-C at baseline >190 mg/dl: Indication for Moderate intensity statin, consider high-intensity based upon existing high CAC and established carotid art athero  Primary Severe HLD / FH (baseline LDL-C >190)    Calculated Risk for ASCVD, if applicable    The ASCVD Risk score (Rao JUAN Jr, et al., 2013) failed to calculate., n/a due to baseline LDL-C >190, underestimation   MARTINEZ risk with CAC = 5.5% - underestimate due to LDL >190  MARTINEZ risk w/o CAC = 1.7%     Other Significant Risk Markers, if any, deshawn all that apply   elevated coronary calcium score and Family history of premature ASCVD in first degree relative   Risk-enhancers: Famhx of premature ASCVD and " Persistently elevated LDL-C >159     Goal LDL-C and nonHDL-C based on Clinic Protocol  LDL-C <100 (consider non-HDL-C <130, apoB <90), optional LDL-C <70  At goal? Yes for LDL <100 2/2022 ,    LIFESTYLE RECOMMENDATIONS FROM TODAY'S VISIT:    Smoking:  reports that she has never smoked. She has never used smokeless tobacco.   - continued complete avoidance of all tobacco products     Physical Activity: continue healthy activity to improve CV fitness, see care instructions for additional details     Weight Management and Nutrition: Dietary plan was discussed with patient at this visit including DASH, low sodium and/or as outlined in care instructions   Eating Plan: Concentrate on  Low sat/Trans fat, Low simple carb and DASH/Med Style diet    LIPID LOWERING MEDICATION MANAGEMENT:     Statin Therapy Recommendations from Today’s Visit:   - continue atorvastatin 20mg daily   - consider 40mg if LDL remains elevated at next labs     Non-Statin Medications Recommendations from Today’s Visit:   Zetia: add as 2nd agent   Nexletol (avoid simva >20, prava >40): not currently indicated   Rx-grade Omega-3 FA: not currently indicated   BAS: not currently indicated     Indication for PCSK9 Inhibitor, if applicable:Not currently indicated    Supplements Recommended at this visit: - consider cholest-off      RECOMMENDATIONS FOR OTHER CV RISK FACTORS:    1) BLOOD PRESSURE MANAGEMENT:  ACC/AHA (2017) goal <130/80  Home BP at goal: yes  Office BP at goal:  yes   Plan:   - continue healthy diet, activity, weight mgmt   Monitoring:   - routine clinic-based BP measurements at least once annually   Medications: no meds indicated at this time      2) Glycemic Status: Normal    ANTITHROMBOTIC THERAPY  - continue ASA 81mg daily, CAC >100 and PAF     OTHER ISSUES:    # CHRIS on CPAP  Strongly encouraged routine adherence to reduce RV strain, improve overall fatigue symptoms, and modestly improve SBP.    - continue CPAP, defer mgmt to sleep MD    #  hypothyroidism, stable  Tolerating meds, TSH normal   - continue current treatment plan, defer mgmt to PCP     #ankle injury with some calf and swelling present-Venous duplex ordered stat    Studies Ordered at Todays Visit: Rt leg venous duplex stat   Blood Work Ordered At Today’s visit: lipid, cmp, CBC  Follow-Up:  3 months with Dr. Cooper per pt request.    TOMI Noriega  [facility]   [facility]   [phone]   Time: 39min - chart review/prep, review of other providers' records, imaging/lab review, face-to-face time for history/examination, ordering, prescribing,  review of results/meds/ treatment plan with patient/family/caregiver, documentation in EMR, care coordination (as needed)    CC:  EDGARDO Reid, Francis SOLARES M.D.

## 2022-09-29 ENCOUNTER — PATIENT MESSAGE (OUTPATIENT)
Dept: CARDIOLOGY | Facility: MEDICAL CENTER | Age: 57
End: 2022-09-29
Payer: COMMERCIAL

## 2022-09-29 NOTE — PATIENT COMMUNICATION
Francis Yates M.D.       10:20 AM  Note    If not too bad no change. If having too many breakthroughs can increase flec to 75 BID        DS recommendations above from 07/25/22. [employer] response sent to pt.

## 2022-11-10 ENCOUNTER — HOSPITAL ENCOUNTER (OUTPATIENT)
Dept: LAB | Facility: MEDICAL CENTER | Age: 57
End: 2022-11-10
Attending: NURSE PRACTITIONER
Payer: COMMERCIAL

## 2022-11-10 DIAGNOSIS — Z79.02 LONG TERM (CURRENT) USE OF ANTITHROMBOTICS/ANTIPLATELETS: ICD-10-CM

## 2022-11-10 DIAGNOSIS — E78.00 PRIMARY HYPERCHOLESTEROLEMIA: ICD-10-CM

## 2022-11-10 LAB
ALBUMIN SERPL BCP-MCNC: 4.4 G/DL (ref 3.2–4.9)
ALBUMIN/GLOB SERPL: 1.7 G/DL
ALP SERPL-CCNC: 106 U/L (ref 30–99)
ALT SERPL-CCNC: 29 U/L (ref 2–50)
ANION GAP SERPL CALC-SCNC: 9 MMOL/L (ref 7–16)
AST SERPL-CCNC: 19 U/L (ref 12–45)
BASOPHILS # BLD AUTO: 1.6 % (ref 0–1.8)
BASOPHILS # BLD: 0.08 K/UL (ref 0–0.12)
BILIRUB SERPL-MCNC: 0.4 MG/DL (ref 0.1–1.5)
BUN SERPL-MCNC: 11 MG/DL (ref 8–22)
CALCIUM SERPL-MCNC: 9.2 MG/DL (ref 8.5–10.5)
CHLORIDE SERPL-SCNC: 102 MMOL/L (ref 96–112)
CHOLEST SERPL-MCNC: 163 MG/DL (ref 100–199)
CO2 SERPL-SCNC: 26 MMOL/L (ref 20–33)
CREAT SERPL-MCNC: 0.65 MG/DL (ref 0.5–1.4)
EOSINOPHIL # BLD AUTO: 0.12 K/UL (ref 0–0.51)
EOSINOPHIL NFR BLD: 2.5 % (ref 0–6.9)
ERYTHROCYTE [DISTWIDTH] IN BLOOD BY AUTOMATED COUNT: 44.7 FL (ref 35.9–50)
FASTING STATUS PATIENT QL REPORTED: NORMAL
GFR SERPLBLD CREATININE-BSD FMLA CKD-EPI: 101 ML/MIN/1.73 M 2
GLOBULIN SER CALC-MCNC: 2.6 G/DL (ref 1.9–3.5)
GLUCOSE SERPL-MCNC: 90 MG/DL (ref 65–99)
HCT VFR BLD AUTO: 42.1 % (ref 37–47)
HDLC SERPL-MCNC: 73 MG/DL
HGB BLD-MCNC: 13.9 G/DL (ref 12–16)
IMM GRANULOCYTES # BLD AUTO: 0.01 K/UL (ref 0–0.11)
IMM GRANULOCYTES NFR BLD AUTO: 0.2 % (ref 0–0.9)
LDLC SERPL CALC-MCNC: 80 MG/DL
LYMPHOCYTES # BLD AUTO: 1.6 K/UL (ref 1–4.8)
LYMPHOCYTES NFR BLD: 32.9 % (ref 22–41)
MCH RBC QN AUTO: 30.5 PG (ref 27–33)
MCHC RBC AUTO-ENTMCNC: 33 G/DL (ref 33.6–35)
MCV RBC AUTO: 92.5 FL (ref 81.4–97.8)
MONOCYTES # BLD AUTO: 0.68 K/UL (ref 0–0.85)
MONOCYTES NFR BLD AUTO: 14 % (ref 0–13.4)
NEUTROPHILS # BLD AUTO: 2.37 K/UL (ref 2–7.15)
NEUTROPHILS NFR BLD: 48.8 % (ref 44–72)
NRBC # BLD AUTO: 0 K/UL
NRBC BLD-RTO: 0 /100 WBC
PLATELET # BLD AUTO: 262 K/UL (ref 164–446)
PMV BLD AUTO: 10.5 FL (ref 9–12.9)
POTASSIUM SERPL-SCNC: 4.2 MMOL/L (ref 3.6–5.5)
PROT SERPL-MCNC: 7 G/DL (ref 6–8.2)
RBC # BLD AUTO: 4.55 M/UL (ref 4.2–5.4)
SODIUM SERPL-SCNC: 137 MMOL/L (ref 135–145)
TRIGL SERPL-MCNC: 52 MG/DL (ref 0–149)
WBC # BLD AUTO: 4.9 K/UL (ref 4.8–10.8)

## 2022-11-10 PROCEDURE — 36415 COLL VENOUS BLD VENIPUNCTURE: CPT

## 2022-11-10 PROCEDURE — 85025 COMPLETE CBC W/AUTO DIFF WBC: CPT

## 2022-11-10 PROCEDURE — 80061 LIPID PANEL: CPT

## 2022-11-10 PROCEDURE — 80053 COMPREHEN METABOLIC PANEL: CPT

## 2022-11-11 ENCOUNTER — OFFICE VISIT (OUTPATIENT)
Dept: VASCULAR LAB | Facility: MEDICAL CENTER | Age: 57
End: 2022-11-11
Attending: FAMILY MEDICINE
Payer: COMMERCIAL

## 2022-11-11 VITALS
HEART RATE: 64 BPM | DIASTOLIC BLOOD PRESSURE: 76 MMHG | SYSTOLIC BLOOD PRESSURE: 128 MMHG | BODY MASS INDEX: 27.58 KG/M2 | WEIGHT: 186.2 LBS | HEIGHT: 69 IN

## 2022-11-11 DIAGNOSIS — I25.10 CORONARY ARTERY CALCIFICATION SEEN ON CT SCAN: ICD-10-CM

## 2022-11-11 DIAGNOSIS — E78.41 ELEVATED LIPOPROTEIN(A): ICD-10-CM

## 2022-11-11 DIAGNOSIS — E78.00 PRIMARY HYPERCHOLESTEROLEMIA: ICD-10-CM

## 2022-11-11 DIAGNOSIS — G47.33 OBSTRUCTIVE SLEEP APNEA SYNDROME: ICD-10-CM

## 2022-11-11 DIAGNOSIS — I65.23 ATHEROSCLEROSIS OF BOTH CAROTID ARTERIES: ICD-10-CM

## 2022-11-11 DIAGNOSIS — R93.1 AGATSTON CAC SCORE 200-399: ICD-10-CM

## 2022-11-11 DIAGNOSIS — D68.69 SECONDARY HYPERCOAGULABLE STATE (HCC): ICD-10-CM

## 2022-11-11 DIAGNOSIS — Z79.02 LONG TERM (CURRENT) USE OF ANTITHROMBOTICS/ANTIPLATELETS: ICD-10-CM

## 2022-11-11 DIAGNOSIS — I48.0 PAROXYSMAL ATRIAL FIBRILLATION (HCC): ICD-10-CM

## 2022-11-11 PROCEDURE — 99212 OFFICE O/P EST SF 10 MIN: CPT

## 2022-11-11 PROCEDURE — 99214 OFFICE O/P EST MOD 30 MIN: CPT | Performed by: FAMILY MEDICINE

## 2022-11-11 ASSESSMENT — FIBROSIS 4 INDEX: FIB4 SCORE: 0.79

## 2022-11-11 ASSESSMENT — ENCOUNTER SYMPTOMS
FEVER: 0
MYALGIAS: 0
BRUISES/BLEEDS EASILY: 0
ORTHOPNEA: 0
CLAUDICATION: 0
WEAKNESS: 0
PALPITATIONS: 0
NAUSEA: 0
CHILLS: 0
COUGH: 0

## 2022-11-11 NOTE — PROGRESS NOTES
[facility]   Date of Service: 11/11/22     Zehra Adam has been referred for evaluation and management of dyslipidemia    Referral Source: Francis Yates M.D.     Subjective     Current/interval symptoms or concerns:   overall feeling well, but reports some ongoing indiscretion with foods    Change in weight:  reduced  BMI Readings from Last 5 Encounters:   11/11/22 27.50 kg/m²   08/23/22 26.20 kg/m²   06/23/22 25.55 kg/m²   02/25/22 26.05 kg/m²   12/28/21 24.81 kg/m²     Exercise habits: moderate regular exercise program  Dietary patterns: higher simple CHO intake, low red meat   Reported barriers to care: none     History of ASCVD: CAD as evidenced by high CAC score   High-risk conditions: None    Secondary causes of dyslipidemia:  Endocrine/Hypothyroidism:  Known subclinical hypothyroidism, opted for tx, tolerating meds, TSH wnl   Liver disease: mild high ALT in past, currently stable 6/2021   Other Established (non-atherosclerotic) Vascular Disease, if Present: None  Age at Initial Diagnosis of Dyslipidemia: >5yrs    Current Prescription Lipid Lowering Medications - including dose:   Statin: atorva 20mg since 5/2021   Non-Statin: None  Current Lipid Lowering and Related Supplements: None  Any Current Side Effects Potentially Related to Lipid Lowering therapy? No  Current Adherence to Lipid Lowering Therapies   Complete  Previously Attempted Interventions for Lipids - including outcome  Statin: None   Outcome: N/A  Non-Statin: None  Outcome: N/A  Any Previous History of Statin Intolerance? No  Baseline Lipids Prior to Treatment:      Ref. Range 3/2/2016 07:33   Cholesterol,Tot Latest Ref Range: 100 - 199 mg/dL 284 (H)   Triglycerides Latest Ref Range: 0 - 149 mg/dL 86   HDL Latest Ref Range: >=40 mg/dL 71   LDL Latest Ref Range: <100 mg/dL 196 (H)     Anticoagulation/antiplats: Yes, Details: ASA 81mg , no bleeding noted     HTN:  No dx or meds   Adherence to current HTN meds:  "compliant all of the time        Current Outpatient Medications:     flecainide, 50 mg, Oral, BID, Taking    atorvastatin, TAKE 1 TABLET BY MOUTH EVERY DAY IN THE EVENING, Taking    levothyroxine, TAKE 1 TABLET BY MOUTH EVERY DAY ON AN EMPTY STOMACH, Taking    Multiple Vitamin (MULTI-VITAMIN PO), Take  by mouth., Taking    Glucosamine HCl (GLUCOSAMINE PO), Take  by mouth., Taking    VITAMIN D PO, Take  by mouth., Taking    VITAMIN E PO, Take  by mouth., Taking    TURMERIC PO, Take  by mouth., Taking    aspirin EC, 81 mg, Oral, DAILY, Taking    Social History     Tobacco Use    Smoking status: Never    Smokeless tobacco: Never   Vaping Use    Vaping Use: Never used   Substance Use Topics    Alcohol use: Yes     Alcohol/week: 0.6 oz     Types: 1 Glasses of wine per week     Comment: 4-5 a week    Drug use: No     Review of Systems   Constitutional:  Negative for chills and fever.   Respiratory:  Negative for cough.    Cardiovascular:  Negative for chest pain, palpitations, orthopnea, claudication and leg swelling.   Gastrointestinal:  Negative for nausea.   Musculoskeletal:  Negative for myalgias.   Neurological:  Negative for weakness.   Endo/Heme/Allergies:  Does not bruise/bleed easily.        Objective       Vitals:    11/11/22 0924 11/11/22 0929   BP: (!) 147/84 128/76   BP Location: Left arm Left arm   Patient Position: Sitting Sitting   BP Cuff Size: Adult Adult   Pulse: 62 64   Weight: 84.5 kg (186 lb 3.2 oz)    Height: 1.753 m (5' 9\")         BP Readings from Last 5 Encounters:   11/11/22 128/76   08/23/22 118/72   06/23/22 105/70   02/25/22 109/68   12/28/21 132/78     Physical Exam  Constitutional:       General: She is not in acute distress.     Appearance: She is well-developed. She is not diaphoretic.   HENT:      Head: Normocephalic.   Eyes:      Conjunctiva/sclera: Conjunctivae normal.   Pulmonary:      Effort: Pulmonary effort is normal. No respiratory distress.   Musculoskeletal:      Right lower " leg: Edema present.   Skin:     Coloration: Skin is not pale.      Findings: No rash.   Neurological:      Mental Status: She is alert and oriented to person, place, and time.      Cranial Nerves: No cranial nerve deficit.   Psychiatric:         Behavior: Behavior normal.       DATA REVIEW:  Most Recent Lipid Panel:   Lab Results   Component Value Date    CHOLSTRLTOT 163 11/10/2022    TRIGLYCERIDE 52 11/10/2022    HDL 73 11/10/2022    LDL 80 11/10/2022     Lab Results   Component Value Date/Time    LIPOPROTA 56 (H) 08/19/2021 07:07 AM      No results found for: APOB      Other Pertinent Blood Work:   Lab Results   Component Value Date    SODIUM 137 11/10/2022    POTASSIUM 4.2 11/10/2022    CHLORIDE 102 11/10/2022    CO2 26 11/10/2022    ANION 9.0 11/10/2022    GLUCOSE 90 11/10/2022    BUN 11 11/10/2022    CREATININE 0.65 11/10/2022    CALCIUM 9.2 11/10/2022    ASTSGOT 19 11/10/2022    ALTSGPT 29 11/10/2022    ALKPHOSPHAT 106 (H) 11/10/2022    TBILIRUBIN 0.4 11/10/2022    ALBUMIN 4.4 11/10/2022    AGRATIO 1.7 11/10/2022    LIPOPROTA 56 (H) 08/19/2021    TSHULTRASEN 1.840 06/18/2021     VASCULAR IMAGING:     Echo 1/2020  Normal echocardiogram.   No prior study is available for comparison.     Carotid 5/2020  1.  There is no significant atherosclerotic plaque.   2.  There is no evidence of carotid occlusion.   3. Vertebral arteries demonstrate antegrade flow.   4. Diameter reduction in the internal carotid arteries: less than 50%. There is no hemodynamically significant stenosis.   5. Technologist indicates real-time evaluation, that there appear to be findings suggestive of incomplete right subclavian steal.    CAC 5/2021  Coronary calcification:  LMA - 0.0  LCX - 53.6  LAD - 186.0  RCA - 4.3  PDA - 0.0   Total Calcium Score: 243.9   Percentile: Calcium score is above the 90th percentile for the patient's age and sex.   Other findings:  Heart: Normal size.  Lungs: Clear.  Mediastinum: Normal.  Upper abdomen:  "Normal.    Carotid 7/2021   Mild bilateral internal carotid plaque, no evidence of carotid stenosis.   Possible incomplete right subclavian steal.    RLE venous 8/2022    No evidence of right  lower extremity deep venous thrombosis.           ASSESSMENT AND PLAN  1. Primary hypercholesterolemia  Comp Metabolic Panel    Lipid Profile      2. Coronary artery calcification seen on CT scan  Comp Metabolic Panel    Lipid Profile      3. Agatston CAC score 200-399  Comp Metabolic Panel    Lipid Profile      4. Elevated lipoprotein(a)        5. Paroxysmal atrial fibrillation (HCC)        6. Long term (current) use of antithrombotics/antiplatelets        7. Atherosclerosis of both carotid arteries        8. Obstructive sleep apnea syndrome        9. Secondary hypercoagulable state (HCC)          Patient Type, check all that apply: Primary Prevention, higher risk due to CAC >100,<300    Established Atherosclerotic Cardiovascular Disease (ASCVD): yes    1) subclinical CAD, evidenced by  (97th%ile for age/gender)  Distribution in 3 vessels, sparing LMA  Low risk for near-term CVD however 97th%ile indicates poor trajectory of risk of future ASCVD  - use secondary med mgmt goals due to high CAC    - recommended for ASA 81mg due to PAF as well     2) PAF, stable, rhythm-controlled - episode in Feb  NICKI-VASC = 1, no indications for OAC   - continue meds with antiplat, fu with Dr. Yates (EP)    - ED precautions if worsening sx, recurrent episodes or focal neuro s/s     3) carotid art atherosclerosis with possible \"incomplete\" R subclav steal 7/2021   No hx of TIA/CVA or symptoms, no RUE claudcation  - no additional imaging, consider if worsening RUE claudication or symptoms  - continue secondary prevention med mgmt     Other Established (non-atherosclerotic) Vascular Disease, if Present:None    Evidence of Heterozygous Familial Hypercholesterolemia (FH):   Yes   - baseline LDL-C >190  - lp(a) elevation found in 30-50% HeFH "   - fhx of HLD and heart disease as noted - to obtain pre-tx LDL's from family if possible     FH genotyping:  Reviewed as option as monogenic FH would require more intensive therapy and has downstream increased ASCVD risk compared to polygenic vs other primary HLD    - she will call if wishes to proceed with testing via [facility], or we can address at next visit   [employer] [facility]-Education give on free genetic testing available for all adults over 18 yrs of age.   - provided brochure and information for further review.    - can consider [facility] testing as well     ACC/AHA Indication for Statin Therapy, deshawn all that apply:  LDL-C at baseline >190 mg/dl: Indication for High intensity statin   Primary Severe HLD / FH (baseline LDL-C >190)    Calculated Risk for ASCVD, if applicable    The ASCVD Risk score (Leonel CUETO, et al., 2019) failed to calculate., n/a due to baseline LDL-C >190, underestimation   MARTINEZ risk with CAC = 5.5% - underestimate due to LDL >190    Other Significant Risk Markers, if any, deshawn all that apply   elevated coronary calcium score and Family history of premature ASCVD in first degree relative  - as noted above   Risk-enhancers: Famhx of premature ASCVD, Persistently elevated LDL-C >159, and Lp(a) >50     Goal LDL-C and nonHDL-C based on Clinic Protocol  LDL-C <70 as per 2022 ECDP due to subclinical CAD and possible FH   At goal?  No, 11/2022     - primary focus pratibha intensification of TLC and diet changes prior to adjustment of meds     LIFESTYLE RECOMMENDATIONS FROM TODAY'S VISIT:    Smoking:  reports that she has never smoked. She has never used smokeless tobacco.   - continued complete avoidance of all tobacco products     Physical Activity: continue healthy activity to improve CV fitness, see care instructions for additional details     Weight Management and Nutrition: Med diet plan, plant-based style    LIPID LOWERING MEDICATION MANAGEMENT:     Statin Therapy Recommendations from  Today’s Visit:   - continue atorvastatin 20mg daily   - consider 40mg if LDL remains elevated at next labs     Non-Statin Medications Recommendations from Today’s Visit:   - add zetia as next agent     Indication for PCSK9 Inhibitor, if applicable:Not currently indicated    Supplements Recommended at this visit: - start plant sterols/stanols 1g two times daily with food and berberine 500mg 3 times daily with food (preferred to reduce GI upset)     RECOMMENDATIONS FOR OTHER CV RISK FACTORS:    1) BLOOD PRESSURE MANAGEMENT:  ACC/AHA (2017) goal <130/80  Home BP at goal: yes  Office BP at goal:  yes   Plan:   - continue healthy diet, activity, weight mgmt   Monitoring:   - routine clinic-based BP measurements at least once annually   Medications: no meds indicated at this time      2) Glycemic Status: Normal    ANTITHROMBOTIC THERAPY  - continue ASA 81mg daily, CAC >100 and PAF     OTHER ISSUES:    # CHRIS on CPAP  Strongly encouraged routine adherence to reduce RV strain, improve overall fatigue symptoms, and modestly improve SBP.    - continue CPAP, defer mgmt to sleep MD    # hypothyroidism, stable  Tolerating meds, TSH normal   - continue current treatment plan, defer mgmt to PCP     Studies Ordered at Todays Visit:   none   Blood Work Ordered At Today’s visit: as noted   Follow-Up:  Feb     Josué Cooper M.D.  Vascular [facility] Clinic   [facility]   [phone]

## 2022-11-11 NOTE — PATIENT INSTRUCTIONS
Benefits/limitations/risks for dyslipidemic genetic testing reviewed.  Further resources for review available at [email or web address] site    Indications and rationale for dyslipidemia genetic testing:  -Strong clinical suspicion of a genetic dyslipidemia.  - assist with definitive diagnosis and aid in treatment decision-making  -Strong family history of dyslipidemia or its complications.  -Presence of related syndromic features  -Evidence that testing might .  -Available and effective early interventions exist.  -Eligibility for new or investigational drugs.  -Patient preference.  -Family planning  - informing need for cascade screening of family members     Benefits include:   -Confirmation of a clinical diagnosis of FH, especially in cases where it is not clear whether the person has FH or not.  -Provides more information about one’s risk or diagnosis, since not all individuals with FH present the same.  -Often results in initiation and intensification of therapy by a healthcare provider. Studies have also shown that individuals with FH are more willing to start, intensify or continue taking prescribed medications when given genetic confirmation.  -Provides information regarding why a healthy lifestyle and diet have not been able to control cholesterol levels on their own.  -Helps other family members to be screened.  -Determines whether or not FH has been passed down to a child, since everyone with -FH has a 50% chance of doing so.    Limitations include:   -It does not always provide a simple “yes” or “no” answer about FH.  -A negative test result does not always mean someone does not have FH--it simply means that their genetic cause(s) were not identified with current knowledge and genetic testing technologies. About 30-40% of people with clinically diagnosed FH may test negative. These results may be “false negatives” or the person might have a gene variant that has not yet been identified to be  pathogenic, or disease causing. They may also have polygenic hypercholesterolemia.  -Testing can be expensive if not covered by insurance, although financial aid and low-cost options may be available.  -Privacy and discrimination concerns. While the Genetic Information Non-discrimination Act (MYLES) protects most people from discrimination when it comes to employment or health insurance coverage, it does not protect everyone or address other kinds of insurance, such as life, disability, or long-term care insurance.  -You should understand these limitations and carefully consider what it might mean for you and your children before you undergo genetic testing. These concerns may also apply  for other kinds of test results such as lipid panels.    Genetic Testing, Privacy, and Discrimination Protection  Your genetic information cannot be used in employment decisions or to determine eligibility for health insurance, thanks to the Genetic Information Nondiscrimination Act of 2008 (MYLES).   -However, MYLES does not apply to companies with fewer than 15 employees, to families covered by [employer] health services, the [employer], [facility], or to decisions related to life, long-term care, or disability insurance.   -Federal employees covered by the Federal Employees Health Benefit Plan are not covered by MYLES, but are protected by an Executive Order.   -Any of these protections can be reversed if MYLES or the Executive Order are reversed. -If you feel you have been discriminated against, your option is to complain to the State  for health insurance or the Equal Employment Opportunity Commission for employment discrimination. Learn more about MYLES and protection against genetic discrimination.

## 2022-11-12 DIAGNOSIS — I48.0 PAROXYSMAL ATRIAL FIBRILLATION (HCC): ICD-10-CM

## 2022-11-15 RX ORDER — FLECAINIDE ACETATE 50 MG/1
50 TABLET ORAL 2 TIMES DAILY
Qty: 180 TABLET | Refills: 2 | Status: CANCELLED | OUTPATIENT
Start: 2022-11-15

## 2022-11-15 NOTE — TELEPHONE ENCOUNTER
Is the patient due for a refill? Yes    Was the patient seen the past year? Yes    Date of last office visit: 6/23/22    Does the patient have an upcoming appointment?  No       Provider to refill:MAHOGANY    Does the patients insurance require a 100 day supply?  No

## 2022-11-17 RX ORDER — FLECAINIDE ACETATE 50 MG/1
75 TABLET ORAL 2 TIMES DAILY
Qty: 270 TABLET | Refills: 1 | Status: SHIPPED | OUTPATIENT
Start: 2022-11-17 | End: 2023-05-23

## 2023-01-24 ENCOUNTER — HOSPITAL ENCOUNTER (OUTPATIENT)
Dept: LAB | Facility: MEDICAL CENTER | Age: 58
End: 2023-01-24
Attending: FAMILY MEDICINE
Payer: COMMERCIAL

## 2023-01-24 LAB
25(OH)D3 SERPL-MCNC: 31 NG/ML (ref 30–100)
BASOPHILS # BLD AUTO: 0.6 % (ref 0–1.8)
BASOPHILS # BLD: 0.04 K/UL (ref 0–0.12)
EOSINOPHIL # BLD AUTO: 0.05 K/UL (ref 0–0.51)
EOSINOPHIL NFR BLD: 0.8 % (ref 0–6.9)
ERYTHROCYTE [DISTWIDTH] IN BLOOD BY AUTOMATED COUNT: 46 FL (ref 35.9–50)
HCT VFR BLD AUTO: 44.1 % (ref 37–47)
HGB BLD-MCNC: 14.3 G/DL (ref 12–16)
IMM GRANULOCYTES # BLD AUTO: 0.02 K/UL (ref 0–0.11)
IMM GRANULOCYTES NFR BLD AUTO: 0.3 % (ref 0–0.9)
LYMPHOCYTES # BLD AUTO: 1.8 K/UL (ref 1–4.8)
LYMPHOCYTES NFR BLD: 27.4 % (ref 22–41)
MCH RBC QN AUTO: 30.4 PG (ref 27–33)
MCHC RBC AUTO-ENTMCNC: 32.4 G/DL (ref 33.6–35)
MCV RBC AUTO: 93.8 FL (ref 81.4–97.8)
MONOCYTES # BLD AUTO: 0.64 K/UL (ref 0–0.85)
MONOCYTES NFR BLD AUTO: 9.8 % (ref 0–13.4)
NEUTROPHILS # BLD AUTO: 4.01 K/UL (ref 2–7.15)
NEUTROPHILS NFR BLD: 61.1 % (ref 44–72)
NRBC # BLD AUTO: 0 K/UL
NRBC BLD-RTO: 0 /100 WBC
PLATELET # BLD AUTO: 282 K/UL (ref 164–446)
PMV BLD AUTO: 10.7 FL (ref 9–12.9)
RBC # BLD AUTO: 4.7 M/UL (ref 4.2–5.4)
TSH SERPL DL<=0.005 MIU/L-ACNC: 1.85 UIU/ML (ref 0.38–5.33)
WBC # BLD AUTO: 6.6 K/UL (ref 4.8–10.8)

## 2023-01-24 PROCEDURE — 85025 COMPLETE CBC W/AUTO DIFF WBC: CPT

## 2023-01-24 PROCEDURE — 36415 COLL VENOUS BLD VENIPUNCTURE: CPT

## 2023-01-24 PROCEDURE — 84443 ASSAY THYROID STIM HORMONE: CPT

## 2023-01-24 PROCEDURE — 82306 VITAMIN D 25 HYDROXY: CPT

## 2023-02-15 ENCOUNTER — HOSPITAL ENCOUNTER (OUTPATIENT)
Dept: LAB | Facility: MEDICAL CENTER | Age: 58
End: 2023-02-15
Attending: FAMILY MEDICINE
Payer: COMMERCIAL

## 2023-02-15 DIAGNOSIS — I25.10 CORONARY ARTERY CALCIFICATION SEEN ON CT SCAN: ICD-10-CM

## 2023-02-15 DIAGNOSIS — R93.1 AGATSTON CAC SCORE 200-399: ICD-10-CM

## 2023-02-15 DIAGNOSIS — E78.00 PRIMARY HYPERCHOLESTEROLEMIA: ICD-10-CM

## 2023-02-15 LAB
ALBUMIN SERPL BCP-MCNC: 4.3 G/DL (ref 3.2–4.9)
ALBUMIN/GLOB SERPL: 1.7 G/DL
ALP SERPL-CCNC: 102 U/L (ref 30–99)
ALT SERPL-CCNC: 42 U/L (ref 2–50)
ANION GAP SERPL CALC-SCNC: 7 MMOL/L (ref 7–16)
AST SERPL-CCNC: 44 U/L (ref 12–45)
BILIRUB SERPL-MCNC: 0.5 MG/DL (ref 0.1–1.5)
BUN SERPL-MCNC: 8 MG/DL (ref 8–22)
CALCIUM ALBUM COR SERPL-MCNC: 9.3 MG/DL (ref 8.5–10.5)
CALCIUM SERPL-MCNC: 9.5 MG/DL (ref 8.5–10.5)
CHLORIDE SERPL-SCNC: 103 MMOL/L (ref 96–112)
CHOLEST SERPL-MCNC: 169 MG/DL (ref 100–199)
CO2 SERPL-SCNC: 30 MMOL/L (ref 20–33)
CREAT SERPL-MCNC: 0.57 MG/DL (ref 0.5–1.4)
FASTING STATUS PATIENT QL REPORTED: NORMAL
GFR SERPLBLD CREATININE-BSD FMLA CKD-EPI: 104 ML/MIN/1.73 M 2
GLOBULIN SER CALC-MCNC: 2.5 G/DL (ref 1.9–3.5)
GLUCOSE SERPL-MCNC: 87 MG/DL (ref 65–99)
HDLC SERPL-MCNC: 72 MG/DL
LDLC SERPL CALC-MCNC: 82 MG/DL
POTASSIUM SERPL-SCNC: 3.9 MMOL/L (ref 3.6–5.5)
PROT SERPL-MCNC: 6.8 G/DL (ref 6–8.2)
SODIUM SERPL-SCNC: 140 MMOL/L (ref 135–145)
TRIGL SERPL-MCNC: 75 MG/DL (ref 0–149)

## 2023-02-15 PROCEDURE — 36415 COLL VENOUS BLD VENIPUNCTURE: CPT

## 2023-02-15 PROCEDURE — 80053 COMPREHEN METABOLIC PANEL: CPT

## 2023-02-15 PROCEDURE — 80061 LIPID PANEL: CPT

## 2023-02-17 ENCOUNTER — OFFICE VISIT (OUTPATIENT)
Dept: VASCULAR LAB | Facility: MEDICAL CENTER | Age: 58
End: 2023-02-17
Attending: FAMILY MEDICINE
Payer: COMMERCIAL

## 2023-02-17 VITALS
DIASTOLIC BLOOD PRESSURE: 61 MMHG | BODY MASS INDEX: 26.66 KG/M2 | WEIGHT: 180 LBS | SYSTOLIC BLOOD PRESSURE: 110 MMHG | HEART RATE: 57 BPM | HEIGHT: 69 IN

## 2023-02-17 DIAGNOSIS — D68.69 SECONDARY HYPERCOAGULABLE STATE (HCC): ICD-10-CM

## 2023-02-17 DIAGNOSIS — E78.41 ELEVATED LIPOPROTEIN(A): ICD-10-CM

## 2023-02-17 DIAGNOSIS — I25.10 CORONARY ARTERY CALCIFICATION SEEN ON CT SCAN: ICD-10-CM

## 2023-02-17 DIAGNOSIS — G47.33 OBSTRUCTIVE SLEEP APNEA SYNDROME: ICD-10-CM

## 2023-02-17 DIAGNOSIS — I48.0 PAROXYSMAL ATRIAL FIBRILLATION (HCC): ICD-10-CM

## 2023-02-17 DIAGNOSIS — Z79.02 LONG TERM (CURRENT) USE OF ANTITHROMBOTICS/ANTIPLATELETS: ICD-10-CM

## 2023-02-17 DIAGNOSIS — E78.00 PRIMARY HYPERCHOLESTEROLEMIA: ICD-10-CM

## 2023-02-17 DIAGNOSIS — I65.23 ATHEROSCLEROSIS OF BOTH CAROTID ARTERIES: ICD-10-CM

## 2023-02-17 DIAGNOSIS — R93.1 AGATSTON CAC SCORE 200-399: ICD-10-CM

## 2023-02-17 PROCEDURE — 99212 OFFICE O/P EST SF 10 MIN: CPT

## 2023-02-17 PROCEDURE — 99214 OFFICE O/P EST MOD 30 MIN: CPT | Performed by: FAMILY MEDICINE

## 2023-02-17 ASSESSMENT — ENCOUNTER SYMPTOMS
PALPITATIONS: 0
CLAUDICATION: 0
PND: 0
CHILLS: 0
SPUTUM PRODUCTION: 0
COUGH: 0
WHEEZING: 0
SHORTNESS OF BREATH: 0
FEVER: 0
ORTHOPNEA: 0
HEMOPTYSIS: 0

## 2023-02-17 ASSESSMENT — FIBROSIS 4 INDEX: FIB4 SCORE: 1.42

## 2023-02-18 NOTE — PROGRESS NOTES
[facility]   02/17/23     Zehra Adam has been referred for evaluation and management of dyslipidemia    Referral Source: Francis Yates M.D.     Subjective     Current/interval symptoms or concerns:  last seen 11/2022, had labs.  Feeling well.  Would like to see RD     Change in weight:  reduced  BMI Readings from Last 5 Encounters:   02/17/23 26.58 kg/m²   11/11/22 27.50 kg/m²   08/23/22 26.20 kg/m²   06/23/22 25.55 kg/m²   02/25/22 26.05 kg/m²     Exercise habits: moderate regular exercise program  Dietary patterns: reduce red meat, increase oatmeal most days   Reported barriers to care: none     History of ASCVD:   # CAD as evidenced by high CAC score - no prior event, no current sx     High-risk conditions: None    Secondary causes of dyslipidemia:  Endocrine/Hypothyroidism:  stable subclinical hypothyroidism   Other Established (non-atherosclerotic) Vascular Disease, if Present: None  Age at Initial Diagnosis of Dyslipidemia: >5yrs    Current Prescription Lipid Lowering Medications - including dose:   Statin: atorva 20mg since 5/2021   Non-Statin: None  Current Lipid Lowering and Related Supplements: None  Any Current Side Effects Potentially Related to Lipid Lowering therapy? No  Current Adherence to Lipid Lowering Therapies Complete  Previously Attempted Interventions for Lipids - including outcome  Statin: None   Outcome: N/A  Non-Statin: None  Outcome: N/A  Any Previous History of Statin Intolerance? No  Baseline Lipids Prior to Treatment:      Ref. Range 3/2/2016 07:33   Cholesterol,Tot Latest Ref Range: 100 - 199 mg/dL 284 (H)   Triglycerides Latest Ref Range: 0 - 149 mg/dL 86   HDL Latest Ref Range: >=40 mg/dL 71   LDL Latest Ref Range: <100 mg/dL 196 (H)     Anticoagulation/antiplats: Yes, Details: ASA 81mg , no bleeding noted     HTN: no prior dx or meds       Current Outpatient Medications:     flecainide, 75 mg, Oral, BID, Taking    atorvastatin, TAKE 1 TABLET  "BY MOUTH EVERY DAY IN THE EVENING, Taking    levothyroxine, TAKE 1 TABLET BY MOUTH EVERY DAY ON AN EMPTY STOMACH, Taking    Multiple Vitamin (MULTI-VITAMIN PO), Take  by mouth., Taking    Glucosamine HCl (GLUCOSAMINE PO), Take  by mouth., Taking    VITAMIN D PO, Take  by mouth., Taking    VITAMIN E PO, Take  by mouth., Taking    TURMERIC PO, Take  by mouth., Taking    aspirin EC, 81 mg, Oral, DAILY, Taking    Social History     Tobacco Use    Smoking status: Never    Smokeless tobacco: Never   Vaping Use    Vaping Use: Never used   Substance Use Topics    Alcohol use: Yes     Alcohol/week: 0.6 oz     Types: 1 Glasses of wine per week     Comment: 4-5 a week    Drug use: No     Review of Systems   Constitutional:  Negative for chills, fever and malaise/fatigue.   Respiratory:  Negative for cough, hemoptysis, sputum production, shortness of breath and wheezing.    Cardiovascular:  Negative for chest pain, palpitations, orthopnea, claudication, leg swelling and PND.        Objective       Vitals:    02/17/23 1605   BP: 110/61   BP Location: Left arm   Patient Position: Sitting   BP Cuff Size: Adult   Pulse: (!) 57   Weight: 81.6 kg (180 lb)   Height: 1.753 m (5' 9\")        BP Readings from Last 5 Encounters:   02/17/23 110/61   11/11/22 128/76   08/23/22 118/72   06/23/22 105/70   02/25/22 109/68     Physical Exam  Constitutional:       General: She is not in acute distress.     Appearance: Normal appearance. She is well-developed. She is not diaphoretic.   HENT:      Head: Normocephalic.   Eyes:      Extraocular Movements: Extraocular movements intact.      Conjunctiva/sclera: Conjunctivae normal.   Pulmonary:      Effort: Pulmonary effort is normal. No respiratory distress.   Skin:     Coloration: Skin is not pale.      Findings: No rash.   Neurological:      Mental Status: She is alert and oriented to person, place, and time.      Cranial Nerves: No cranial nerve deficit.       DATA REVIEW:  Most Recent Lipid Panel: "   Lab Results   Component Value Date    CHOLSTRLTOT 169 02/15/2023    TRIGLYCERIDE 75 02/15/2023    HDL 72 02/15/2023    LDL 82 02/15/2023     Lab Results   Component Value Date/Time    LIPOPROTA 56 (H) 08/19/2021 07:07 AM      No results found for: APOB      Other Pertinent Blood Work:   Lab Results   Component Value Date    SODIUM 140 02/15/2023    POTASSIUM 3.9 02/15/2023    CHLORIDE 103 02/15/2023    CO2 30 02/15/2023    ANION 7.0 02/15/2023    GLUCOSE 87 02/15/2023    BUN 8 02/15/2023    CREATININE 0.57 02/15/2023    CALCIUM 9.5 02/15/2023    ASTSGOT 44 02/15/2023    ALTSGPT 42 02/15/2023    ALKPHOSPHAT 102 (H) 02/15/2023    TBILIRUBIN 0.5 02/15/2023    ALBUMIN 4.3 02/15/2023    AGRATIO 1.7 02/15/2023    LIPOPROTA 56 (H) 08/19/2021    TSHULTRASEN 1.850 01/24/2023     VASCULAR IMAGING:     Echo 1/2020  Normal echocardiogram.   No prior study is available for comparison.     Carotid 5/2020  1.  There is no significant atherosclerotic plaque.   2.  There is no evidence of carotid occlusion.   3. Vertebral arteries demonstrate antegrade flow.   4. Diameter reduction in the internal carotid arteries: less than 50%. There is no hemodynamically significant stenosis.   5. Technologist indicates real-time evaluation, that there appear to be findings suggestive of incomplete right subclavian steal.    CAC 5/2021  Coronary calcification:  LMA - 0.0  LCX - 53.6  LAD - 186.0  RCA - 4.3  PDA - 0.0   Total Calcium Score: 243.9   Percentile: Calcium score is above the 90th percentile for the patient's age and sex.   Other findings:  Heart: Normal size.  Lungs: Clear.  Mediastinum: Normal.  Upper abdomen: Normal.    Carotid 7/2021   Mild bilateral internal carotid plaque, no evidence of carotid stenosis.   Possible incomplete right subclavian steal.    RLE venous 8/2022    No evidence of right  lower extremity deep venous thrombosis.         ASSESSMENT AND PLAN  1. Primary hypercholesterolemia  Referral to Nutrition Services     "Comp Metabolic Panel    Lipid Profile    CRP HIGH SENSITIVE (CARDIAC)      2. Coronary artery calcification seen on CT scan  Referral to Nutrition Services    Comp Metabolic Panel    Lipid Profile    CRP HIGH SENSITIVE (CARDIAC)      3. Agatston CAC score 200-399  Referral to Nutrition Services      4. Elevated lipoprotein(a)  Referral to Nutrition Services      5. Atherosclerosis of both carotid arteries        6. Paroxysmal atrial fibrillation (HCC)        7. Long term (current) use of antithrombotics/antiplatelets        8. Secondary hypercoagulable state (HCC)        9. Obstructive sleep apnea syndrome          Patient Type, check all that apply: Primary Prevention, higher risk due to CAC >100,<300    Established Atherosclerotic Cardiovascular Disease (ASCVD): yes    1) subclinical CAD, evidenced by CACS = 243 (97th%ile for age/gender) in 2021   Distribution in 3 vessels, sparing LMA  Low risk for near-term CVD however 97th%ile indicates poor trajectory of risk of future ASCVD  - use secondary med mgmt goals due to high CAC    - no functional testing unless sx develop    2) PAF, stable, rhythm-controlled - episode in Feb  NICKI-VASC = 1, no indications for OAC   - continue meds with antiplat, fu with Dr. Yates (EP)    - ED precautions if worsening sx, recurrent episodes or focal neuro s/s     3) carotid art atherosclerosis with possible \"incomplete\" R subclav steal 7/2021   No hx of TIA/CVA or symptoms, no RUE claudcation  - no additional imaging, consider if worsening RUE claudication or symptoms  - continue secondary prevention med mgmt     Other Established (non-atherosclerotic) Vascular Disease, if Present:None    Evidence of Heterozygous Familial Hypercholesterolemia (FH): Yes   - baseline LDL-C >190  - lp(a) elevation found in 30-50% HeFH   - fhx of HLD and heart disease as noted - to obtain pre-tx LDL's from family if possible     FH genotyping:  Reviewed as option as monogenic FH would require more " intensive therapy and has downstream increased ASCVD risk compared to polygenic vs other primary HLD    - reviewed HNP vs invitae testing - has not completed to date     ACC/AHA Indication for Statin Therapy, deshawn all that apply:  LDL-C at baseline >190 mg/dl: Indication for High intensity statin   Primary Severe HLD / FH (baseline LDL-C >190)    Calculated Risk for ASCVD, if applicable    The ASCVD Risk score (Leonel DK, et al., 2019) failed to calculate., n/a due to baseline LDL-C >190, underestimation   MARTINEZ risk with CAC = 5.5% - underestimate due to LDL >190    Other Significant Risk Markers, if any, deshawn all that apply   elevated coronary calcium score and Family history of premature ASCVD in first degree relative  - as noted above   Risk-enhancers: Famhx of premature ASCVD, Persistently elevated LDL-C >159, and Lp(a) >50     Goal LDL-C and nonHDL-C based on Clinic Protocol  LDL-C reduction of >50% and goal of <70 as per 2022 ECDP due to CACS >75%ile  At goal?  No, 2/2023     As reviewed we have reduce >50% but still about 16% above LDL <70 goal  -  primary focus pratibha intensification of TLC and diet changes  - consider further med adjustments in future     - has achieved 58% LDL-C reduction on current tx regimen, resulting in estimated 5-year RRR of CVD of 60% (derived from Cholesterol Treatment Trialist's (Lancet, 2010) - estimated 20% RRR per every 38mg LDL-C reduction)     LIFESTYLE RECOMMENDATIONS FROM TODAY'S VISIT:    Smoking:  reports that she has never smoked. She has never used smokeless tobacco.   - continued complete avoidance of all tobacco products     Physical Activity: continue healthy activity to improve CV fitness, see care instructions for additional details     Weight Management and Nutrition: Med diet plan, plant-based style reinforced  - ref to RD for further eval     LIPID LOWERING MEDICATION MANAGEMENT:     Statin Therapy Recommendations from Today’s Visit:   - continue atorvastatin 20mg  daily   - reviewed option of increasing to 40mg for further 6% LDL-C reduction     Non-Statin Medications Recommendations from Today’s Visit:   - would recommend zetia 10mg for additional 18-25% LDL-C reduction -  will consider at next visit     Indication for PCSK9 Inhibitor, if applicable:Not currently indicated    Supplements Recommended at this visit: could consider plant sterol with berberine     RECOMMENDATIONS FOR OTHER CV RISK FACTORS:    1) BLOOD PRESSURE MANAGEMENT:  ACC/AHA (2017) goal <130/80  Home BP at goal: yes  Office BP at goal:  yes   Plan:   - continue healthy diet, activity, weight mgmt   Monitoring:   - routine clinic-based BP measurements at least once annually   Medications: no meds indicated at this time      2) Glycemic Status: Normal    ANTITHROMBOTIC THERAPY:  continue ASA 81mg daily, CAC >100 and PAF     OTHER ISSUES:    # CHRIS on CPAP  Strongly encouraged routine adherence to reduce RV strain, improve overall fatigue symptoms, and modestly improve SBP.    - continue CPAP, defer mgmt to sleep MD    # hypothyroidism, stable  Tolerating meds, TSH normal   - continue current treatment plan, defer mgmt to PCP     Studies Ordered at Todays Visit:   none   Blood Work Ordered At Today’s visit: as noted   Follow-Up:     Josué Cooper M.D.  [facility]   [facility]   [phone]

## 2023-03-23 ENCOUNTER — APPOINTMENT (RX ONLY)
Dept: URBAN - METROPOLITAN AREA CLINIC 6 | Facility: CLINIC | Age: 58
Setting detail: DERMATOLOGY
End: 2023-03-23

## 2023-03-23 DIAGNOSIS — L82.1 OTHER SEBORRHEIC KERATOSIS: ICD-10-CM

## 2023-03-23 DIAGNOSIS — D22 MELANOCYTIC NEVI: ICD-10-CM

## 2023-03-23 DIAGNOSIS — L60.3 NAIL DYSTROPHY: ICD-10-CM

## 2023-03-23 DIAGNOSIS — D18.0 HEMANGIOMA: ICD-10-CM

## 2023-03-23 DIAGNOSIS — Z71.89 OTHER SPECIFIED COUNSELING: ICD-10-CM

## 2023-03-23 DIAGNOSIS — L81.4 OTHER MELANIN HYPERPIGMENTATION: ICD-10-CM

## 2023-03-23 PROBLEM — D18.01 HEMANGIOMA OF SKIN AND SUBCUTANEOUS TISSUE: Status: ACTIVE | Noted: 2023-03-23

## 2023-03-23 PROBLEM — D22.5 MELANOCYTIC NEVI OF TRUNK: Status: ACTIVE | Noted: 2023-03-23

## 2023-03-23 PROCEDURE — ? COUNSELING

## 2023-03-23 PROCEDURE — 99213 OFFICE O/P EST LOW 20 MIN: CPT

## 2023-03-23 ASSESSMENT — LOCATION ZONE DERM
LOCATION ZONE: FINGERNAIL
LOCATION ZONE: HAND
LOCATION ZONE: TRUNK
LOCATION ZONE: FACE

## 2023-03-23 ASSESSMENT — LOCATION DETAILED DESCRIPTION DERM
LOCATION DETAILED: LEFT RING FINGERNAIL
LOCATION DETAILED: RIGHT RADIAL DORSAL HAND
LOCATION DETAILED: EPIGASTRIC SKIN
LOCATION DETAILED: PERIUMBILICAL SKIN
LOCATION DETAILED: RIGHT MEDIAL BREAST 4-5:00 REGION
LOCATION DETAILED: RIGHT MEDIAL SUPERIOR CHEST
LOCATION DETAILED: LEFT ULNAR DORSAL HAND
LOCATION DETAILED: LEFT SUPERIOR UPPER BACK
LOCATION DETAILED: LEFT INFERIOR MEDIAL MALAR CHEEK

## 2023-03-23 ASSESSMENT — LOCATION SIMPLE DESCRIPTION DERM
LOCATION SIMPLE: RIGHT BREAST
LOCATION SIMPLE: ABDOMEN
LOCATION SIMPLE: CHEST
LOCATION SIMPLE: LEFT CHEEK
LOCATION SIMPLE: RIGHT HAND
LOCATION SIMPLE: LEFT UPPER BACK
LOCATION SIMPLE: LEFT RING FINGERNAIL
LOCATION SIMPLE: LEFT HAND

## 2023-04-19 ENCOUNTER — RX ONLY (OUTPATIENT)
Age: 58
Setting detail: RX ONLY
End: 2023-04-19

## 2023-04-19 RX ORDER — EFINACONAZOLE 100 MG/ML
SOLUTION TOPICAL
Qty: 8 | Refills: 8 | Status: ERX

## 2023-05-09 DIAGNOSIS — E78.2 MIXED HYPERLIPIDEMIA: ICD-10-CM

## 2023-05-10 NOTE — TELEPHONE ENCOUNTER
Is the patient due for a refill? Yes    Was the patient seen the past year? Yes    Date of last office visit: 06/23/2022    Does the patient have an upcoming appointment?  No    Provider to refill:DS    Does the patients insurance require a 100 day supply?  No

## 2023-05-11 RX ORDER — ATORVASTATIN CALCIUM 20 MG/1
20 TABLET, FILM COATED ORAL EVERY EVENING
Qty: 90 TABLET | Refills: 3 | Status: SHIPPED | OUTPATIENT
Start: 2023-05-11 | End: 2023-08-05 | Stop reason: SDUPTHER

## 2023-05-22 ENCOUNTER — PATIENT MESSAGE (OUTPATIENT)
Dept: CARDIOLOGY | Facility: MEDICAL CENTER | Age: 58
End: 2023-05-22
Payer: COMMERCIAL

## 2023-05-22 DIAGNOSIS — I48.0 PAROXYSMAL ATRIAL FIBRILLATION (HCC): ICD-10-CM

## 2023-05-23 RX ORDER — FLECAINIDE ACETATE 50 MG/1
TABLET ORAL
Qty: 270 TABLET | Refills: 1 | Status: SHIPPED | OUTPATIENT
Start: 2023-05-23 | End: 2023-11-06

## 2023-05-24 ENCOUNTER — OFFICE VISIT (OUTPATIENT)
Dept: CARDIOLOGY | Facility: MEDICAL CENTER | Age: 58
End: 2023-05-24
Attending: INTERNAL MEDICINE
Payer: COMMERCIAL

## 2023-05-24 VITALS
WEIGHT: 188 LBS | DIASTOLIC BLOOD PRESSURE: 68 MMHG | RESPIRATION RATE: 18 BRPM | BODY MASS INDEX: 27.85 KG/M2 | HEIGHT: 69 IN | OXYGEN SATURATION: 97 % | HEART RATE: 61 BPM | SYSTOLIC BLOOD PRESSURE: 122 MMHG

## 2023-05-24 DIAGNOSIS — E78.2 MIXED HYPERLIPIDEMIA: ICD-10-CM

## 2023-05-24 DIAGNOSIS — E78.41 ELEVATED LIPOPROTEIN(A): ICD-10-CM

## 2023-05-24 DIAGNOSIS — G47.33 OBSTRUCTIVE SLEEP APNEA SYNDROME: ICD-10-CM

## 2023-05-24 DIAGNOSIS — R93.1 AGATSTON CAC SCORE 200-399: ICD-10-CM

## 2023-05-24 DIAGNOSIS — I48.0 PAROXYSMAL ATRIAL FIBRILLATION (HCC): ICD-10-CM

## 2023-05-24 DIAGNOSIS — E02 SUBCLINICAL IODINE-DEFICIENCY HYPOTHYROIDISM: ICD-10-CM

## 2023-05-24 LAB — EKG IMPRESSION: NORMAL

## 2023-05-24 PROCEDURE — 99214 OFFICE O/P EST MOD 30 MIN: CPT | Mod: 25 | Performed by: INTERNAL MEDICINE

## 2023-05-24 PROCEDURE — 3074F SYST BP LT 130 MM HG: CPT | Performed by: INTERNAL MEDICINE

## 2023-05-24 PROCEDURE — 93010 ELECTROCARDIOGRAM REPORT: CPT | Performed by: INTERNAL MEDICINE

## 2023-05-24 PROCEDURE — 3078F DIAST BP <80 MM HG: CPT | Performed by: INTERNAL MEDICINE

## 2023-05-24 PROCEDURE — 99212 OFFICE O/P EST SF 10 MIN: CPT | Performed by: INTERNAL MEDICINE

## 2023-05-24 PROCEDURE — 93005 ELECTROCARDIOGRAM TRACING: CPT | Performed by: INTERNAL MEDICINE

## 2023-05-24 ASSESSMENT — FIBROSIS 4 INDEX: FIB4 SCORE: 1.44

## 2023-05-24 NOTE — PROGRESS NOTES
No chief complaint on file.      Subjective     Zehra Adam is a 58 y.o. female who presents today with paroxysmal atrial fibrillation.  CHADS-VASC score 1.  On flecainide 75 twice daily.  On aspirin.  On statins.  Does require with an iWatch.  No prolonged episodes.  Vagally mediated.    Past Medical History:   Diagnosis Date    Atrial fibrillation (HCC)     Hyperlipidemia     Hypothyroidism     Thyroid disease      History reviewed. No pertinent surgical history.  Family History   Problem Relation Age of Onset    Thyroid Mother     Heart Disease Mother         Afib, TAVR    Hypertension Mother     Other Mother     Diabetes Father     Thyroid Sister     Heart Disease Brother     Sleep Apnea Brother     Hyperlipidemia Brother     Heart Attack Maternal Grandmother     Heart Attack Brother         ? MI (69)    Arrythmia Brother      Social History     Socioeconomic History    Marital status:      Spouse name: Not on file    Number of children: Not on file    Years of education: Not on file    Highest education level: Not on file   Occupational History    Not on file   Tobacco Use    Smoking status: Never    Smokeless tobacco: Never   Vaping Use    Vaping Use: Never used   Substance and Sexual Activity    Alcohol use: Yes     Alcohol/week: 4.2 - 5.4 oz     Types: 1 Glasses of wine, 6 - 8 Standard drinks or equivalent per week     Comment: 4-5 a week    Drug use: No    Sexual activity: Not on file   Other Topics Concern    Not on file   Social History Narrative    ** Merged History Encounter **          Social Determinants of Health     Financial Resource Strain: Not on file   Food Insecurity: Not on file   Transportation Needs: Not on file   Physical Activity: Not on file   Stress: Not on file   Social Connections: Not on file   Intimate Partner Violence: Not on file   Housing Stability: Not on file     Allergies   Allergen Reactions    Codeine     Metoprolol      Outpatient Encounter Medications  "as of 5/24/2023   Medication Sig Dispense Refill    metoprolol tartrate (LOPRESSOR) 25 MG Tab Take 1 Tablet by mouth every 6 hours as needed (palps). 90 Tablet 3    flecainide (TAMBOCOR) 50 MG tablet TAKE 1 AND 1/2 TABLETS BY MOUTH 2 TIMES A  Tablet 1    atorvastatin (LIPITOR) 20 MG Tab Take 1 Tablet by mouth every evening. 90 Tablet 3    levothyroxine (SYNTHROID) 75 MCG Tab TAKE 1 TABLET BY MOUTH EVERY DAY ON AN EMPTY STOMACH      Multiple Vitamin (MULTI-VITAMIN PO) Take  by mouth.      Glucosamine HCl (GLUCOSAMINE PO) Take  by mouth.      VITAMIN D PO Take  by mouth.      VITAMIN E PO Take  by mouth.      TURMERIC PO Take  by mouth.      aspirin EC (ECOTRIN) 81 MG Tablet Delayed Response Take 1 Tab by mouth every day. 90 Tab 3     No facility-administered encounter medications on file as of 5/24/2023.     ROS           Objective     /68 (BP Location: Left arm, Patient Position: Sitting, BP Cuff Size: Adult long)   Pulse 61   Resp 18   Ht 1.753 m (5' 9\")   Wt 85.3 kg (188 lb)   SpO2 97%   BMI 27.76 kg/m²     Physical Exam  Constitutional:       Appearance: She is well-developed.   HENT:      Head: Normocephalic and atraumatic.   Eyes:      Pupils: Pupils are equal, round, and reactive to light.   Cardiovascular:      Rate and Rhythm: Normal rate and regular rhythm.      Heart sounds: Normal heart sounds. No murmur heard.     No friction rub. No gallop.   Pulmonary:      Effort: Pulmonary effort is normal.      Breath sounds: Normal breath sounds.   Abdominal:      General: Bowel sounds are normal.      Palpations: Abdomen is soft.   Musculoskeletal:         General: Normal range of motion.      Cervical back: Normal range of motion and neck supple.   Skin:     General: Skin is warm.   Neurological:      Mental Status: She is alert and oriented to person, place, and time.      Cranial Nerves: No cranial nerve deficit.   Psychiatric:         Behavior: Behavior normal.         Thought Content: " Thought content normal.         Judgment: Judgment normal.                Assessment & Plan     1. Paroxysmal atrial fibrillation (HCC)  EKG    metoprolol tartrate (LOPRESSOR) 25 MG Tab      2. Agatston CAC score 200-399        3. Elevated lipoprotein(a)        4. Mixed hyperlipidemia        5. Subclinical iodine-deficiency hypothyroidism        6. Obstructive sleep apnea syndrome            Medical Decision Making: Today's Assessment/Status/Plan:   1.  Paroxysmal atrial fibrillation continue flecainide.  Metoprolol as needed.  2.  Hyperlipidemia on statins  3.  Sleep apnea treated.  4.  Follow-up with me in 6 to 12 months.

## 2023-08-05 DIAGNOSIS — E78.2 MIXED HYPERLIPIDEMIA: ICD-10-CM

## 2023-08-07 RX ORDER — ATORVASTATIN CALCIUM 20 MG/1
20 TABLET, FILM COATED ORAL EVERY EVENING
Qty: 90 TABLET | Refills: 3 | Status: SHIPPED | OUTPATIENT
Start: 2023-08-07

## 2023-08-14 ENCOUNTER — HOSPITAL ENCOUNTER (OUTPATIENT)
Dept: LAB | Facility: MEDICAL CENTER | Age: 58
End: 2023-08-14
Attending: FAMILY MEDICINE
Payer: COMMERCIAL

## 2023-08-14 DIAGNOSIS — I25.10 CORONARY ARTERY CALCIFICATION SEEN ON CT SCAN: ICD-10-CM

## 2023-08-14 DIAGNOSIS — E78.00 PRIMARY HYPERCHOLESTEROLEMIA: ICD-10-CM

## 2023-08-14 LAB
ALBUMIN SERPL BCP-MCNC: 4.4 G/DL (ref 3.2–4.9)
ALBUMIN/GLOB SERPL: 1.9 G/DL
ALP SERPL-CCNC: 102 U/L (ref 30–99)
ALT SERPL-CCNC: 23 U/L (ref 2–50)
ANION GAP SERPL CALC-SCNC: 8 MMOL/L (ref 7–16)
AST SERPL-CCNC: 23 U/L (ref 12–45)
BILIRUB SERPL-MCNC: 0.6 MG/DL (ref 0.1–1.5)
BUN SERPL-MCNC: 11 MG/DL (ref 8–22)
CALCIUM ALBUM COR SERPL-MCNC: 9 MG/DL (ref 8.5–10.5)
CALCIUM SERPL-MCNC: 9.3 MG/DL (ref 8.5–10.5)
CHLORIDE SERPL-SCNC: 106 MMOL/L (ref 96–112)
CHOLEST SERPL-MCNC: 154 MG/DL (ref 100–199)
CO2 SERPL-SCNC: 27 MMOL/L (ref 20–33)
CREAT SERPL-MCNC: 0.55 MG/DL (ref 0.5–1.4)
CRP SERPL HS-MCNC: 0.3 MG/L (ref 0–3)
FASTING STATUS PATIENT QL REPORTED: NORMAL
GFR SERPLBLD CREATININE-BSD FMLA CKD-EPI: 105 ML/MIN/1.73 M 2
GLOBULIN SER CALC-MCNC: 2.3 G/DL (ref 1.9–3.5)
GLUCOSE SERPL-MCNC: 87 MG/DL (ref 65–99)
HDLC SERPL-MCNC: 67 MG/DL
LDLC SERPL CALC-MCNC: 77 MG/DL
POTASSIUM SERPL-SCNC: 3.9 MMOL/L (ref 3.6–5.5)
PROT SERPL-MCNC: 6.7 G/DL (ref 6–8.2)
SODIUM SERPL-SCNC: 141 MMOL/L (ref 135–145)
TRIGL SERPL-MCNC: 52 MG/DL (ref 0–149)

## 2023-08-14 PROCEDURE — 80053 COMPREHEN METABOLIC PANEL: CPT

## 2023-08-14 PROCEDURE — 36415 COLL VENOUS BLD VENIPUNCTURE: CPT

## 2023-08-14 PROCEDURE — 86141 C-REACTIVE PROTEIN HS: CPT

## 2023-08-14 PROCEDURE — 80061 LIPID PANEL: CPT

## 2023-08-17 ENCOUNTER — OFFICE VISIT (OUTPATIENT)
Dept: VASCULAR LAB | Facility: MEDICAL CENTER | Age: 58
End: 2023-08-17
Attending: FAMILY MEDICINE
Payer: COMMERCIAL

## 2023-08-17 VITALS
BODY MASS INDEX: 26.96 KG/M2 | SYSTOLIC BLOOD PRESSURE: 112 MMHG | WEIGHT: 182 LBS | HEART RATE: 58 BPM | HEIGHT: 69 IN | DIASTOLIC BLOOD PRESSURE: 70 MMHG

## 2023-08-17 DIAGNOSIS — I65.23 ATHEROSCLEROSIS OF BOTH CAROTID ARTERIES: ICD-10-CM

## 2023-08-17 DIAGNOSIS — D68.69 SECONDARY HYPERCOAGULABLE STATE (HCC): ICD-10-CM

## 2023-08-17 DIAGNOSIS — Z79.02 LONG TERM (CURRENT) USE OF ANTITHROMBOTICS/ANTIPLATELETS: ICD-10-CM

## 2023-08-17 DIAGNOSIS — E78.41 ELEVATED LIPOPROTEIN(A): Chronic | ICD-10-CM

## 2023-08-17 DIAGNOSIS — E78.00 PRIMARY HYPERCHOLESTEROLEMIA: Chronic | ICD-10-CM

## 2023-08-17 DIAGNOSIS — I25.10 CORONARY ARTERY CALCIFICATION SEEN ON CT SCAN: ICD-10-CM

## 2023-08-17 DIAGNOSIS — G47.33 OBSTRUCTIVE SLEEP APNEA SYNDROME: ICD-10-CM

## 2023-08-17 DIAGNOSIS — R93.1 AGATSTON CAC SCORE 200-399: ICD-10-CM

## 2023-08-17 DIAGNOSIS — I48.0 PAROXYSMAL ATRIAL FIBRILLATION (HCC): ICD-10-CM

## 2023-08-17 PROCEDURE — 99214 OFFICE O/P EST MOD 30 MIN: CPT | Performed by: FAMILY MEDICINE

## 2023-08-17 PROCEDURE — 3078F DIAST BP <80 MM HG: CPT | Performed by: FAMILY MEDICINE

## 2023-08-17 PROCEDURE — 3074F SYST BP LT 130 MM HG: CPT | Performed by: FAMILY MEDICINE

## 2023-08-17 PROCEDURE — 99212 OFFICE O/P EST SF 10 MIN: CPT

## 2023-08-17 ASSESSMENT — ENCOUNTER SYMPTOMS
PALPITATIONS: 0
COUGH: 0
WHEEZING: 0
PND: 0
SPUTUM PRODUCTION: 0
FEVER: 0
CHILLS: 0
SHORTNESS OF BREATH: 0
HEMOPTYSIS: 0
ORTHOPNEA: 0
CLAUDICATION: 0

## 2023-08-17 ASSESSMENT — FIBROSIS 4 INDEX: FIB4 SCORE: 1.02

## 2023-08-17 NOTE — PROGRESS NOTES
[facility]   08/17/23     Zehra Adam has been referred for evaluation and management of dyslipidemia    Referral Source: Francis Yates M.D. (cardiology)    Subjective     Current/interval symptoms or concerns:  last seen 2/2023, feeling well     Change in weight:  reduced  Wt Readings from Last 3 Encounters:   08/17/23 82.6 kg (182 lb)   05/24/23 85.3 kg (188 lb)   02/17/23 81.6 kg (180 lb)      Exercise habits: moderate regular exercise program, most days less curently due to pet illness  Dietary patterns: reduce red meat, increase oatmeal most days   Reported barriers to care: none     History of CVD:  # CAD as evidenced by high CAC score - no prior event, no current sx     # PAF - stable, no sx, tolerating meds and seeing cardiology     High-risk conditions: None    Secondary causes of dyslipidemia:  Endocrine/Hypothyroidism:  stable subclinical hypothyroidism   Other Established (non-atherosclerotic) Vascular Disease, if Present: None  Age at Initial Diagnosis of Dyslipidemia: >5yrs    Current Prescription Lipid Lowering Medications - including dose:   Statin: atorva 20mg since 5/2021   Non-Statin: None  Current Lipid Lowering and Related Supplements: None  Any Current Side Effects Potentially Related to Lipid Lowering therapy? No  Current Adherence to Lipid Lowering Therapies Complete  Previously Attempted Interventions for Lipids - including outcome  Statin: None   Outcome: N/A  Non-Statin: None  Outcome: N/A  Any Previous History of Statin Intolerance? No  Baseline Lipids Prior to Treatment:      Ref. Range 3/2/2016 07:33   Cholesterol,Tot Latest Ref Range: 100 - 199 mg/dL 284 (H)   Triglycerides Latest Ref Range: 0 - 149 mg/dL 86   HDL Latest Ref Range: >=40 mg/dL 71   LDL Latest Ref Range: <100 mg/dL 196 (H)     Anticoagulation/antiplats: Yes, Details: ASA 81mg , no bleeding noted     HTN: no prior dx or meds       Current Outpatient Medications:     atorvastatin, 20  "mg, Oral, Q EVENING, Taking    metoprolol tartrate, 25 mg, Oral, Q6HRS PRN, Taking    flecainide, TAKE 1 AND 1/2 TABLETS BY MOUTH 2 TIMES A DAY, Taking    levothyroxine, TAKE 1 TABLET BY MOUTH EVERY DAY ON AN EMPTY STOMACH, Taking    Multiple Vitamin (MULTI-VITAMIN PO), Take  by mouth., Taking    Glucosamine HCl (GLUCOSAMINE PO), Take  by mouth., Taking    VITAMIN D PO, Take  by mouth., Taking    VITAMIN E PO, Take  by mouth., Taking    TURMERIC PO, Take  by mouth., Taking    aspirin EC, 81 mg, Oral, DAILY, Taking    Social History     Tobacco Use    Smoking status: Never    Smokeless tobacco: Never   Vaping Use    Vaping Use: Never used   Substance Use Topics    Alcohol use: Yes     Alcohol/week: 4.2 - 5.4 oz     Types: 1 Glasses of wine, 6 - 8 Standard drinks or equivalent per week     Comment: 4-5 a week    Drug use: No     Review of Systems   Constitutional:  Negative for chills, fever and malaise/fatigue.   Respiratory:  Negative for cough, hemoptysis, sputum production, shortness of breath and wheezing.    Cardiovascular:  Negative for chest pain, palpitations, orthopnea, claudication, leg swelling and PND.          Objective     Vitals:    08/17/23 0839   BP: 112/70   BP Location: Left arm   Patient Position: Sitting   BP Cuff Size: Adult   Pulse: (!) 58   Weight: 82.6 kg (182 lb)   Height: 1.753 m (5' 9\")        BP Readings from Last 5 Encounters:   08/17/23 112/70   05/24/23 122/68   02/17/23 110/61   11/11/22 128/76   08/23/22 118/72     Physical Exam  Constitutional:       General: She is not in acute distress.     Appearance: Normal appearance. She is well-developed. She is not diaphoretic.   HENT:      Head: Normocephalic.   Eyes:      Extraocular Movements: Extraocular movements intact.      Conjunctiva/sclera: Conjunctivae normal.   Pulmonary:      Effort: Pulmonary effort is normal. No respiratory distress.   Skin:     Coloration: Skin is not pale.      Findings: No rash.   Neurological:      Mental " "Status: She is alert and oriented to person, place, and time.      Cranial Nerves: No cranial nerve deficit.         DATA REVIEW:  Most Recent Lipid Panel:   Lab Results   Component Value Date    CHOLSTRLTOT 154 08/14/2023    TRIGLYCERIDE 52 08/14/2023    HDL 67 08/14/2023    LDL 77 08/14/2023     Lab Results   Component Value Date/Time    LIPOPROTA 56 (H) 08/19/2021 07:07 AM      No results found for: \"APOB\"      Other Pertinent Blood Work:   Lab Results   Component Value Date    SODIUM 141 08/14/2023    POTASSIUM 3.9 08/14/2023    CHLORIDE 106 08/14/2023    CO2 27 08/14/2023    ANION 8.0 08/14/2023    GLUCOSE 87 08/14/2023    BUN 11 08/14/2023    CREATININE 0.55 08/14/2023    CALCIUM 9.3 08/14/2023    ASTSGOT 23 08/14/2023    ALTSGPT 23 08/14/2023    ALKPHOSPHAT 102 (H) 08/14/2023    TBILIRUBIN 0.6 08/14/2023    ALBUMIN 4.4 08/14/2023    AGRATIO 1.9 08/14/2023    CRPHIGHSEN 0.3 08/14/2023    LIPOPROTA 56 (H) 08/19/2021    TSHULTRASEN 1.850 01/24/2023     VASCULAR IMAGING:     Echo 1/2020  Normal echocardiogram.   No prior study is available for comparison.     Carotid 5/2020  1.  There is no significant atherosclerotic plaque.   2.  There is no evidence of carotid occlusion.   3. Vertebral arteries demonstrate antegrade flow.   4. Diameter reduction in the internal carotid arteries: less than 50%. There is no hemodynamically significant stenosis.   5. Technologist indicates real-time evaluation, that there appear to be findings suggestive of incomplete right subclavian steal.    CAC 5/2021  Coronary calcification:  LMA - 0.0  LCX - 53.6  LAD - 186.0  RCA - 4.3  PDA - 0.0   Total Calcium Score: 243.9   Percentile: Calcium score is above the 90th percentile for the patient's age and sex.   Other findings:  Heart: Normal size.  Lungs: Clear.  Mediastinum: Normal.  Upper abdomen: Normal.    Carotid 7/2021   Mild bilateral internal carotid plaque, no evidence of carotid stenosis.   Possible incomplete right subclavian " "steal.    RLE venous 8/2022    No evidence of right  lower extremity deep venous thrombosis.         ASSESSMENT AND PLAN  1. Primary hypercholesterolemia        2. Coronary artery calcification seen on CT scan        3. Agatston CAC score 200-399        4. Elevated lipoprotein(a)        5. Atherosclerosis of both carotid arteries        6. Paroxysmal atrial fibrillation (HCC)        7. Long term (current) use of antithrombotics/antiplatelets        8. Secondary hypercoagulable state (HCC)        9. Obstructive sleep apnea syndrome          Patient Type, check all that apply: Secondary Prevention, CAC >75%ile age/gender    Established Atherosclerotic Cardiovascular Disease (ASCVD): yes    1) subclinical CAD, evidenced by CACS = 243 (97th%ile for age/gender) in 2021   Distribution in 3 vessels, sparing LMA  Low risk for near-term CVD however 97th%ile indicates poor trajectory of risk of future ASCVD  - use secondary med mgmt goals due to high CAC    - no functional testing unless sx develop    2) PAF, stable, rhythm-controlled - episode in Feb  NICKI-VASC = 1, no indications for OAC   - continue meds with antiplat, fu with Dr. Yates (EP)    - ED precautions if worsening sx, recurrent episodes or focal neuro s/s     3) carotid art atherosclerosis with possible \"incomplete\" R subclav steal 7/2021   No hx of TIA/CVA or symptoms, no RUE claudcation  - no additional imaging, consider if worsening RUE claudication or symptoms  - continue secondary prevention med mgmt     Other Established (non-atherosclerotic) Vascular Disease, if Present:None    Evidence of Heterozygous Familial Hypercholesterolemia (FH): Yes   - baseline LDL-C >190  - lp(a) elevation found in 30-50% HeFH   - fhx of HLD and heart disease as noted - to obtain pre-tx LDL's from family if possible     FH genotyping:  Reviewed as option as monogenic FH would require more intensive therapy and has downstream increased ASCVD risk compared to polygenic vs other " primary HLD    - reviewed HNP vs invitae testing - has not completed to date     ACC/AHA Indication for Statin Therapy, deshawn all that apply:  LDL-C at baseline >190 mg/dl: Indication for High intensity statin   Primary Severe HLD / FH (baseline LDL-C >190)    Calculated Risk for ASCVD, if applicable    The ASCVD Risk score (Leonel CUETO, et al., 2019) failed to calculate., n/a due to baseline LDL-C >190, underestimation   MARTINEZ risk with CAC = 5.5% - underestimate due to LDL >190    Other Significant Risk Markers, if any, deshawn all that apply   elevated coronary calcium score and Family history of premature ASCVD in first degree relative  - as noted above   Risk-enhancers: Famhx of premature ASCVD, Persistently elevated LDL-C >159, and Lp(a) >50     Goal LDL-C and nonHDL-C based on Clinic Protocol  LDL-C reduction of >50% and goal of <70 as per 2022 ECDP due to CACS >75%ile  At goal?  Approaching, LDL = 77, 8/2023     As reviewed we have reduce >50% but still about 16% above LDL <70 goal  -  primary focus pratibha intensification of TLC and diet changes  - consider further med adjustments in future     - has achieved 60% LDL-C reduction on current tx regimen, resulting in estimated 5-year RRR of CVD of 69% (derived from Cholesterol Treatment Trialist's (Lancet, 2010) - estimated 22% RRR per every 38mg LDL-C reduction)     LIFESTYLE RECOMMENDATIONS FROM TODAY'S VISIT:    Smoking:  reports that she has never smoked. She has never used smokeless tobacco.   - continued complete avoidance of all tobacco products     Physical Activity: continue healthy activity to improve CV fitness, see care instructions for additional details     Weight Management and Nutrition: Med diet plan, plant-based style reinforced  -gave names of RD - [facility] or [facility] - she will call if needs referral order     LIPID LOWERING MEDICATION MANAGEMENT:     Statin Therapy Recommendations from Today’s Visit:   - continue atorvastatin 20mg daily   - reviewed  option of increasing to 40mg for further 6% LDL-C reduction     Non-Statin Medications Recommendations from Today’s Visit:   - consider zetia intiation - through shared MDM we will hold for now     Indication for PCSK9 Inhibitor, if applicable:Not currently indicated    Supplements Recommended at this visit: could consider plant sterol with berberine     RECOMMENDATIONS FOR OTHER CV RISK FACTORS:    BLOOD PRESSURE MANAGEMENT:  ACC/AHA (2017) goal <130/80  Home BP at goal: yes  Office BP at goal:  yes   Plan:   - continue healthy diet, activity, weight mgmt   Monitoring:   - routine clinic-based BP measurements at least once annually   Medications: no meds indicated at this time      Glycemic Status: Normal    ANTITHROMBOTIC THERAPY:  continue ASA 81mg daily, CAC >100 and PAF     OTHER ISSUES:    # CHRIS on CPAP  Strongly encouraged routine adherence to reduce RV strain, improve overall fatigue symptoms, and modestly improve SBP.    - continue CPAP, defer mgmt to sleep MD    # hypothyroidism, stable  Tolerating meds, TSH normal   - continue current treatment plan, defer mgmt to PCP     Studies Ordered at Todays Visit:   none   Blood Work Ordered At Today’s visit: as noted   Follow-Up: 1yr, sooner dieudonne Cooper M.D.  [facility]   [facility]   [phone]

## 2023-10-09 NOTE — PROGRESS NOTES
CC: Follow-up for mild CHRIS on ResMed APAP 5-15 CWP        HPI:  Zehra Adam is a 58 y.o.female  kindly referred by Berny Meza D.O. last seen by HERNANDEZ Renner on 12/20/2021 when compliance was 97% for 6 hours and 43 minutes minutes with a resultant AHI 0.2 on auto titrating CPAP 5-15 CWP    Prior PSG showed an AHI of 12.7 with a lise saturation of 85%.  The supine AHI was 18.9.  Time spent with oxygen saturation below 89% was 71.1 minutes.    Today, 10/11/2023, her 30 compliance is 100% for 7 hours and 12 minutes.  On APAP 5-15 CWP her average residual estimated apnea-hypopnea index is normalized 0.5.  Her median pressure is 9.8 CWP.  He occasionally has a mild leak.    Recent sleep maintenance insomnia.     The patient reports improved symptoms using positive airway pressure.  Has experienced no significant problems with claustrophobia, nosebleeds, sore throat, dry eyes, mask fit, air leaks around the mask, pressure tolerance, excessive airway dryness, dry or runny nose, nasal congestion, facial irritation, aerophagia, or chest pain.     Significant comorbidities and modifying factors include coronary calcification, carotid atherosclerosis, hypothyroidism, atrial fibrillation, dyslipidemia, chronic insomnia, and never smoker.    Patient Active Problem List    Diagnosis Date Noted    Primary hypercholesterolemia 08/17/2023    Secondary hypercoagulable state (HCC) 02/25/2022    Coronary artery calcification seen on CT scan 02/25/2022    Elevated lipoprotein(a) 08/24/2021    Agatston CAC score 200-399 08/20/2021    Atherosclerosis of both carotid arteries 08/20/2021    Long term (current) use of antithrombotics/antiplatelets 08/20/2021    Hypothyroidism     Obstructive sleep apnea syndrome 11/24/2020    Healthcare maintenance 02/04/2020    Hyperlipidemia 02/04/2020    Atrial fibrillation (HCC) 12/26/2019       Past Medical History:   Diagnosis Date    Atrial fibrillation (HCC)      Hyperlipidemia     Hypothyroidism     Thyroid disease         No past surgical history on file.    Family History   Problem Relation Age of Onset    Thyroid Mother     Heart Disease Mother         Afib, TAVR    Hypertension Mother     Other Mother     Diabetes Father     Thyroid Sister     Heart Disease Brother     Sleep Apnea Brother     Hyperlipidemia Brother     Heart Attack Maternal Grandmother     Heart Attack Brother         ? MI (69)    Arrythmia Brother        Social History     Socioeconomic History    Marital status:      Spouse name: Not on file    Number of children: Not on file    Years of education: Not on file    Highest education level: Not on file   Occupational History    Not on file   Tobacco Use    Smoking status: Never    Smokeless tobacco: Never   Vaping Use    Vaping Use: Never used   Substance and Sexual Activity    Alcohol use: Yes     Alcohol/week: 4.2 - 5.4 oz     Types: 1 Glasses of wine, 6 - 8 Standard drinks or equivalent per week     Comment: 4-5 a week    Drug use: No    Sexual activity: Not on file   Other Topics Concern    Not on file   Social History Narrative    ** Merged History Encounter **          Social Determinants of Health     Financial Resource Strain: Not on file   Food Insecurity: Not on file   Transportation Needs: Not on file   Physical Activity: Not on file   Stress: Not on file   Social Connections: Not on file   Intimate Partner Violence: Not on file   Housing Stability: Not on file       Current Outpatient Medications   Medication Sig Dispense Refill    atorvastatin (LIPITOR) 20 MG Tab Take 1 Tablet by mouth every evening. 90 Tablet 3    metoprolol tartrate (LOPRESSOR) 25 MG Tab Take 1 Tablet by mouth every 6 hours as needed (palps). 90 Tablet 3    flecainide (TAMBOCOR) 50 MG tablet TAKE 1 AND 1/2 TABLETS BY MOUTH 2 TIMES A  Tablet 1    levothyroxine (SYNTHROID) 75 MCG Tab TAKE 1 TABLET BY MOUTH EVERY DAY ON AN EMPTY STOMACH      Multiple Vitamin  "(MULTI-VITAMIN PO) Take  by mouth.      Glucosamine HCl (GLUCOSAMINE PO) Take  by mouth.      VITAMIN D PO Take  by mouth.      VITAMIN E PO Take  by mouth.      TURMERIC PO Take  by mouth.      aspirin EC (ECOTRIN) 81 MG Tablet Delayed Response Take 1 Tab by mouth every day. 90 Tab 3     No current facility-administered medications for this visit.    \"CURRENT RX\"    ALLERGIES: Codeine    ROS    Constitutional: Denies fever, chills, sweats,  weight loss, fatigue  Cardiovascular: Denies chest pain, tightness, palpitations, swelling in legs/feet, fainting, difficulty breathing when lying down but gets better when sitting up.   Respiratory: Denies shortness of breath, cough, sputum, wheezing, painful breathing, coughing up blood.   Sleep: per HPI  Gastrointestinal: Denies  difficulty swallowing, nausea, abdominal pain, diarrhea, constipation, heartburn.  Musculoskeletal: Denies painful joints, sore muscles, back pain.        PHYSICAL EXAM      /72 (BP Location: Left arm, Patient Position: Sitting, BP Cuff Size: Adult)   Pulse 63   Resp 16   Ht 1.753 m (5' 9\")   Wt 83.5 kg (184 lb)   SpO2 98%   BMI 27.17 kg/m²   Appearance: Well-nourished, well-developed, no acute distress  Eyes:  PERRLA, EOMI  ENMT: without change  Neck: Supple, trachea midline, no masses  Respiratory effort:  No intercostal retractions or use of accessory muscles  Lung auscultation:  No wheezes rhonchi rubs or rales  Cardiac: No murmurs, rubs, or gallops; regular rhythm, normal rate; no edema  Abdomen:  No tenderness, no organomegaly.  Musculoskeletal:  Grossly normal; gait and station normal; digits and nails normal  Skin:  No rashes, petechiae, cyanosis  Neurologic: without focal signs; oriented to person, time, place, and purpose; judgement intact  Psychiatric:  No depression, anxiety, agitation      Medical Decision Making       The medical record was reviewed in its entirety including the referral notes, records from primary care, " consultants notes, hospital records, lab, imaging, microbiology, immunology, and immunizations.  Care gaps identified and reviewed with the patient.    Diagnostic and titration nocturnal polysomnogram's, home sleep apnea tests, continuous nocturnal oximetry results, multiple sleep latency tests, and compliance reports reviewed.  1. Obstructive sleep apnea syndrome  - DME Mask and Supplies      PLAN:   Has been advised to continue the current PAP, clean equipment frequently, and get new mask and supplies as allowed by insurance and DME. Advised about potential problems including nasal obstruction/stuffiness, mask leak or discomfort , frequent awakenings, chill or dryness of the upper airway, noise, inconvenience, and lack of benefit. Recommend an earlier appointment, if significant treatment barriers develop.    The risks of untreated sleep apnea were discussed with the patient at length. Patients with CHRIS are at increased risk of cardiovascular disease including systemic arterial hypertension, pulmonary arterial hypertension (transient or fixed), TIA, and an elevated risk of stroke, heart attack, sudden death, or arrhythmia between the hours of 11 PM and 6 AM. CHRIS patients have an increased risk of motor vehicle accidents, type 2 diabetes, GERD, repetitive mechanical trauma to pharyngeal muscles with inflammation and denervation, frequent EEG arousals leading to nonrestorative sleep, excessive daytime sleepiness, fatigue, depression, poor pain control, irritability, and lower quality of life.  The patient was advised to avoid driving a motor vehicle when drowsy.    Positive airway pressure will favorably impact many of the adverse conditions and effects provoked by CHRIS.    Have advised the patient to follow up with the appropriate healthcare practitioners for all other medical problems and issues.    Return in about 1 year (around 10/11/2024).    Total time 30 minutes

## 2023-10-11 ENCOUNTER — OFFICE VISIT (OUTPATIENT)
Dept: SLEEP MEDICINE | Facility: MEDICAL CENTER | Age: 58
End: 2023-10-11
Attending: INTERNAL MEDICINE
Payer: COMMERCIAL

## 2023-10-11 VITALS
HEIGHT: 69 IN | DIASTOLIC BLOOD PRESSURE: 72 MMHG | OXYGEN SATURATION: 98 % | RESPIRATION RATE: 16 BRPM | SYSTOLIC BLOOD PRESSURE: 122 MMHG | WEIGHT: 184 LBS | HEART RATE: 63 BPM | BODY MASS INDEX: 27.25 KG/M2

## 2023-10-11 DIAGNOSIS — G47.33 OBSTRUCTIVE SLEEP APNEA SYNDROME: ICD-10-CM

## 2023-10-11 PROCEDURE — 3078F DIAST BP <80 MM HG: CPT | Performed by: INTERNAL MEDICINE

## 2023-10-11 PROCEDURE — 3074F SYST BP LT 130 MM HG: CPT | Performed by: INTERNAL MEDICINE

## 2023-10-11 PROCEDURE — 99213 OFFICE O/P EST LOW 20 MIN: CPT | Performed by: INTERNAL MEDICINE

## 2023-10-11 PROCEDURE — 99214 OFFICE O/P EST MOD 30 MIN: CPT | Performed by: INTERNAL MEDICINE

## 2023-10-11 ASSESSMENT — FIBROSIS 4 INDEX: FIB4 SCORE: 1.02

## 2023-10-17 ENCOUNTER — PATIENT MESSAGE (OUTPATIENT)
Dept: CARDIOLOGY | Facility: MEDICAL CENTER | Age: 58
End: 2023-10-17
Payer: COMMERCIAL

## 2023-11-01 ENCOUNTER — OFFICE VISIT (OUTPATIENT)
Dept: CARDIOLOGY | Facility: MEDICAL CENTER | Age: 58
End: 2023-11-01
Attending: INTERNAL MEDICINE
Payer: COMMERCIAL

## 2023-11-01 VITALS
HEIGHT: 69 IN | OXYGEN SATURATION: 98 % | SYSTOLIC BLOOD PRESSURE: 126 MMHG | RESPIRATION RATE: 14 BRPM | BODY MASS INDEX: 27.7 KG/M2 | DIASTOLIC BLOOD PRESSURE: 80 MMHG | WEIGHT: 187 LBS | HEART RATE: 62 BPM

## 2023-11-01 DIAGNOSIS — I48.0 PAROXYSMAL ATRIAL FIBRILLATION (HCC): ICD-10-CM

## 2023-11-01 PROCEDURE — 99215 OFFICE O/P EST HI 40 MIN: CPT | Mod: 25 | Performed by: INTERNAL MEDICINE

## 2023-11-01 PROCEDURE — 93010 ELECTROCARDIOGRAM REPORT: CPT | Performed by: INTERNAL MEDICINE

## 2023-11-01 PROCEDURE — 99212 OFFICE O/P EST SF 10 MIN: CPT | Performed by: INTERNAL MEDICINE

## 2023-11-01 PROCEDURE — 93005 ELECTROCARDIOGRAM TRACING: CPT | Performed by: INTERNAL MEDICINE

## 2023-11-01 PROCEDURE — 3074F SYST BP LT 130 MM HG: CPT | Performed by: INTERNAL MEDICINE

## 2023-11-01 PROCEDURE — 3079F DIAST BP 80-89 MM HG: CPT | Performed by: INTERNAL MEDICINE

## 2023-11-01 ASSESSMENT — FIBROSIS 4 INDEX: FIB4 SCORE: 1.02

## 2023-11-01 NOTE — PROGRESS NOTES
Arrhythmia Clinic Note (Established patient)    DOS: 11/1/2023    Chief complaint/Reason for consult: Afib    Interval History: 57 y/o F, pt of Dr Yates. She has been on flecainide for pAF. Having more breakthrough, referred to me to consider ablation. She is quite tearful during the visit and she endorses severe anxiety regarding her diagnosis, medication changes, or any procedures. She is quite symptomatic with afib episodes.     ROS (+ highlighted in bold):  Constitutional: Fevers/chills/fatigue/weightloss  HEENT: Blurry vision/eye pain/sore throat/hearing loss  Respiratory: Shortness of breath/cough  Cardiovascular: Chest pain/palpitations/edema/orthopnea/syncope  GI: Nausea/vomitting/diarrhea  MSK: Arthralgias/myagias/muscle weakness  Skin: Rash/sores  Neurological: Numbness/tremors/vertigo  Endocrine: Excessive thirst/polyuria/cold intolerance/heat intolerance  Psych: Depression/anxiety    Past Medical History:   Diagnosis Date    Atrial fibrillation (HCC)     Hyperlipidemia     Hypothyroidism     Thyroid disease        History reviewed. No pertinent surgical history.    Social History     Socioeconomic History    Marital status:      Spouse name: Not on file    Number of children: Not on file    Years of education: Not on file    Highest education level: Not on file   Occupational History    Not on file   Tobacco Use    Smoking status: Never    Smokeless tobacco: Never   Vaping Use    Vaping Use: Never used   Substance and Sexual Activity    Alcohol use: Yes     Alcohol/week: 4.2 - 5.4 oz     Types: 1 Glasses of wine, 6 - 8 Standard drinks or equivalent per week     Comment: 4-5 a week    Drug use: No    Sexual activity: Not on file   Other Topics Concern    Not on file   Social History Narrative    ** Merged History Encounter **          Social Determinants of Health     Financial Resource Strain: Not on file   Food Insecurity: Not on file   Transportation Needs: Not on file   Physical Activity: Not  "on file   Stress: Not on file   Social Connections: Not on file   Intimate Partner Violence: Not on file   Housing Stability: Not on file       Family History   Problem Relation Age of Onset    Thyroid Mother     Heart Disease Mother         Afib, TAVR    Hypertension Mother     Other Mother     Diabetes Father     Thyroid Sister     Heart Disease Brother     Sleep Apnea Brother     Hyperlipidemia Brother     Heart Attack Maternal Grandmother     Heart Attack Brother         ? MI (69)    Arrythmia Brother        Allergies   Allergen Reactions    Codeine        Current Outpatient Medications   Medication Sig Dispense Refill    atorvastatin (LIPITOR) 20 MG Tab Take 1 Tablet by mouth every evening. 90 Tablet 3    metoprolol tartrate (LOPRESSOR) 25 MG Tab Take 1 Tablet by mouth every 6 hours as needed (palps). 90 Tablet 3    flecainide (TAMBOCOR) 50 MG tablet TAKE 1 AND 1/2 TABLETS BY MOUTH 2 TIMES A  Tablet 1    levothyroxine (SYNTHROID) 75 MCG Tab TAKE 1 TABLET BY MOUTH EVERY DAY ON AN EMPTY STOMACH      Multiple Vitamin (MULTI-VITAMIN PO) Take  by mouth.      Glucosamine HCl (GLUCOSAMINE PO) Take  by mouth.      VITAMIN D PO Take  by mouth.      VITAMIN E PO Take  by mouth.      TURMERIC PO Take  by mouth.      aspirin EC (ECOTRIN) 81 MG Tablet Delayed Response Take 1 Tab by mouth every day. 90 Tab 3     No current facility-administered medications for this visit.       Physical Exam:  Vitals:    11/01/23 0834   BP: 126/80   BP Location: Left arm   Patient Position: Sitting   BP Cuff Size: Adult   Pulse: 62   Resp: 14   SpO2: 98%   Weight: 84.8 kg (187 lb)   Height: 1.753 m (5' 9\")     General appearance: NAD, conversant   Eyes: anicteric sclerae, moist conjunctivae; no lid-lag; PERRLA  HENT: Atraumatic; oropharynx clear with moist mucous membranes and no mucosal ulcerations; normal hard and soft palate  Neck: Trachea midline; FROM, supple, no thyromegaly or lymphadenopathy  Lungs: CTA, with normal respiratory " "effort and no intercostal retractions  CV: RRR, no MRGs, no JVD  Abdomen: Soft, non-tender; no masses or HSM  Extremities: No peripheral edema or extremity lymphadenopathy  Skin: Normal temperature, turgor and texture; no rash, ulcers or subcutaneous nodules  Psych: Appropriate affect, alert and oriented to person, place and time    Data:  Lipids:   Lab Results   Component Value Date/Time    CHOLSTRLTOT 154 08/14/2023 08:09 AM    TRIGLYCERIDE 52 08/14/2023 08:09 AM    HDL 67 08/14/2023 08:09 AM    LDL 77 08/14/2023 08:09 AM        BMP:  Lab Results   Component Value Date/Time    SODIUM 141 08/14/2023 0809    POTASSIUM 3.9 08/14/2023 0809    CHLORIDE 106 08/14/2023 0809    CO2 27 08/14/2023 0809    GLUCOSE 87 08/14/2023 0809    BUN 11 08/14/2023 0809    CREATININE 0.55 08/14/2023 0809    CALCIUM 9.3 08/14/2023 0809    ANION 8.0 08/14/2023 0809        TSH:   Lab Results   Component Value Date/Time    TSHULTRASEN 1.850 01/24/2023 0923        THYROXINE (T4):   No results found for: \"FREEDIR\"     CBC:   Lab Results   Component Value Date/Time    WBC 6.6 01/24/2023 09:23 AM    RBC 4.70 01/24/2023 09:23 AM    HEMOGLOBIN 14.3 01/24/2023 09:23 AM    HEMATOCRIT 44.1 01/24/2023 09:23 AM    MCV 93.8 01/24/2023 09:23 AM    MCH 30.4 01/24/2023 09:23 AM    MCHC 32.4 (L) 01/24/2023 09:23 AM    RDW 46.0 01/24/2023 09:23 AM    PLATELETCT 282 01/24/2023 09:23 AM    MPV 10.7 01/24/2023 09:23 AM    NEUTSPOLYS 61.10 01/24/2023 09:23 AM    LYMPHOCYTES 27.40 01/24/2023 09:23 AM    MONOCYTES 9.80 01/24/2023 09:23 AM    EOSINOPHILS 0.80 01/24/2023 09:23 AM    BASOPHILS 0.60 01/24/2023 09:23 AM    IMMGRAN 0.30 01/24/2023 09:23 AM    NRBC 0.00 01/24/2023 09:23 AM    NEUTS 4.01 01/24/2023 09:23 AM    LYMPHS 1.80 01/24/2023 09:23 AM    MONOS 0.64 01/24/2023 09:23 AM    EOS 0.05 01/24/2023 09:23 AM    BASO 0.04 01/24/2023 09:23 AM    IMMGRANAB 0.02 01/24/2023 09:23 AM    NRBCAB 0.00 01/24/2023 09:23 AM        CBC w/o DIFF  Lab Results   Component " Value Date/Time    WBC 6.6 01/24/2023 09:23 AM    RBC 4.70 01/24/2023 09:23 AM    HEMOGLOBIN 14.3 01/24/2023 09:23 AM    MCV 93.8 01/24/2023 09:23 AM    MCH 30.4 01/24/2023 09:23 AM    MCHC 32.4 (L) 01/24/2023 09:23 AM    RDW 46.0 01/24/2023 09:23 AM    MPV 10.7 01/24/2023 09:23 AM       Prior echo/stress reviewed: Normal echo    EKG interpreted by me: SR with borderline LBBB    Impression/Plan:  1. Paroxysmal atrial fibrillation (HCC)  EKG        pAF  High risk medication use    - We talked at length about options for management including AAD use and AF ablation  - Given borderline LBBB I do not think Ic agent is good long term  - Could try Multaq but likely would still have AF breakthrough. She isn't interested in class III loading admission.  - Discussed ablation. We discussed pulmonary vein isolation for therapeutic management and continued rhythm control.  We discussed the risks and benefits of this procedure.  Risks include 1-3% risk of major cardiovascular event including stroke, myocardial infarction, phrenic nerve damage, esophageal injury and/or fistula formation, cardiac perforation, pericardial effusion, tamponade, major bleeding, or death.  I quoted a 70 to 80% chance free of atrial fibrillation at 12 months.  We discussed that he may also need a second procedure.    She will give it some thought, leaning to ablation, if so would place on OAC 2 months after ablation and likely stop flecainide following ablation.    A total of 45 minutes of time was spent on day of encounter reviewing medical record, performing history and examination, counseling, ordering medication/test/consults, collaborating with referring service, and documentation.      Mahendra Mei MD  Cardiac Electrophysiology     84

## 2023-11-06 ENCOUNTER — TELEPHONE (OUTPATIENT)
Dept: CARDIOLOGY | Facility: MEDICAL CENTER | Age: 58
End: 2023-11-06
Payer: COMMERCIAL

## 2023-11-06 DIAGNOSIS — I48.0 PAROXYSMAL ATRIAL FIBRILLATION (HCC): ICD-10-CM

## 2023-11-06 RX ORDER — FLECAINIDE ACETATE 50 MG/1
75 TABLET ORAL 2 TIMES DAILY
Qty: 270 TABLET | Refills: 1 | Status: SHIPPED | OUTPATIENT
Start: 2023-11-06 | End: 2024-02-01 | Stop reason: SDUPTHER

## 2023-11-06 NOTE — TELEPHONE ENCOUNTER
Kim,    Can you please enter the order for this procedure?    Afib ablation w/ENEDINA    Thank You,  Belinda

## 2023-11-06 NOTE — TELEPHONE ENCOUNTER
Mahendra Mei M.D.  Belinda Wallace, Med Ass't  Caller: Unspecified (Today, 10:57 AM)  That's fine we would plan on start after ablation.

## 2023-11-06 NOTE — TELEPHONE ENCOUNTER
Mahendra Mei M.D.  You; Bernadette Hoyt R.N.; Erin Real R.N.2 minutes ago (10:54 AM)       OK we can schedule afib ablation no meds to hold, thanks

## 2023-11-06 NOTE — TELEPHONE ENCOUNTER
Patient scheduled for afib ablation w/ENEDINA on 1-11-24 with Dr. Mei. Patient has been instructed to check in at 11:00 for 1:00 case time. Message sent to authorizations. Emailed Carto.

## 2023-11-20 LAB — EKG IMPRESSION: NORMAL

## 2023-12-06 ENCOUNTER — APPOINTMENT (OUTPATIENT)
Dept: ADMISSIONS | Facility: MEDICAL CENTER | Age: 58
End: 2023-12-06
Attending: INTERNAL MEDICINE
Payer: COMMERCIAL

## 2023-12-15 ENCOUNTER — PRE-ADMISSION TESTING (OUTPATIENT)
Dept: ADMISSIONS | Facility: MEDICAL CENTER | Age: 58
End: 2023-12-15
Attending: INTERNAL MEDICINE
Payer: COMMERCIAL

## 2024-01-08 ENCOUNTER — PRE-ADMISSION TESTING (OUTPATIENT)
Dept: ADMISSIONS | Facility: MEDICAL CENTER | Age: 59
End: 2024-01-08
Attending: INTERNAL MEDICINE
Payer: COMMERCIAL

## 2024-01-08 DIAGNOSIS — Z01.812 PRE-OPERATIVE LABORATORY EXAMINATION: ICD-10-CM

## 2024-01-08 DIAGNOSIS — Z01.810 PRE-OPERATIVE CARDIOVASCULAR EXAMINATION: ICD-10-CM

## 2024-01-08 LAB
ALBUMIN SERPL BCP-MCNC: 4.5 G/DL (ref 3.2–4.9)
ALBUMIN/GLOB SERPL: 2 G/DL
ALP SERPL-CCNC: 106 U/L (ref 30–99)
ALT SERPL-CCNC: 29 U/L (ref 2–50)
ANION GAP SERPL CALC-SCNC: 10 MMOL/L (ref 7–16)
AST SERPL-CCNC: 20 U/L (ref 12–45)
BASOPHILS # BLD AUTO: 1.2 % (ref 0–1.8)
BASOPHILS # BLD: 0.05 K/UL (ref 0–0.12)
BILIRUB SERPL-MCNC: 0.7 MG/DL (ref 0.1–1.5)
BUN SERPL-MCNC: 12 MG/DL (ref 8–22)
CALCIUM ALBUM COR SERPL-MCNC: 8.7 MG/DL (ref 8.5–10.5)
CALCIUM SERPL-MCNC: 9.1 MG/DL (ref 8.5–10.5)
CHLORIDE SERPL-SCNC: 104 MMOL/L (ref 96–112)
CO2 SERPL-SCNC: 25 MMOL/L (ref 20–33)
CREAT SERPL-MCNC: 0.56 MG/DL (ref 0.5–1.4)
EKG IMPRESSION: NORMAL
EOSINOPHIL # BLD AUTO: 0.06 K/UL (ref 0–0.51)
EOSINOPHIL NFR BLD: 1.4 % (ref 0–6.9)
ERYTHROCYTE [DISTWIDTH] IN BLOOD BY AUTOMATED COUNT: 44 FL (ref 35.9–50)
GFR SERPLBLD CREATININE-BSD FMLA CKD-EPI: 104 ML/MIN/1.73 M 2
GLOBULIN SER CALC-MCNC: 2.3 G/DL (ref 1.9–3.5)
GLUCOSE SERPL-MCNC: 91 MG/DL (ref 65–99)
HCT VFR BLD AUTO: 42.7 % (ref 37–47)
HGB BLD-MCNC: 14.1 G/DL (ref 12–16)
IMM GRANULOCYTES # BLD AUTO: 0.02 K/UL (ref 0–0.11)
IMM GRANULOCYTES NFR BLD AUTO: 0.5 % (ref 0–0.9)
INR PPP: 0.98 (ref 0.87–1.13)
LYMPHOCYTES # BLD AUTO: 1.56 K/UL (ref 1–4.8)
LYMPHOCYTES NFR BLD: 36.4 % (ref 22–41)
MCH RBC QN AUTO: 30.6 PG (ref 27–33)
MCHC RBC AUTO-ENTMCNC: 33 G/DL (ref 32.2–35.5)
MCV RBC AUTO: 92.6 FL (ref 81.4–97.8)
MONOCYTES # BLD AUTO: 0.48 K/UL (ref 0–0.85)
MONOCYTES NFR BLD AUTO: 11.2 % (ref 0–13.4)
NEUTROPHILS # BLD AUTO: 2.12 K/UL (ref 1.82–7.42)
NEUTROPHILS NFR BLD: 49.3 % (ref 44–72)
NRBC # BLD AUTO: 0 K/UL
NRBC BLD-RTO: 0 /100 WBC (ref 0–0.2)
PLATELET # BLD AUTO: 269 K/UL (ref 164–446)
PMV BLD AUTO: 10 FL (ref 9–12.9)
POTASSIUM SERPL-SCNC: 4 MMOL/L (ref 3.6–5.5)
PROT SERPL-MCNC: 6.8 G/DL (ref 6–8.2)
PROTHROMBIN TIME: 13.1 SEC (ref 12–14.6)
RBC # BLD AUTO: 4.61 M/UL (ref 4.2–5.4)
SODIUM SERPL-SCNC: 139 MMOL/L (ref 135–145)
WBC # BLD AUTO: 4.3 K/UL (ref 4.8–10.8)

## 2024-01-08 PROCEDURE — 80053 COMPREHEN METABOLIC PANEL: CPT

## 2024-01-08 PROCEDURE — 85610 PROTHROMBIN TIME: CPT

## 2024-01-08 PROCEDURE — 93010 ELECTROCARDIOGRAM REPORT: CPT | Performed by: INTERNAL MEDICINE

## 2024-01-08 PROCEDURE — 36415 COLL VENOUS BLD VENIPUNCTURE: CPT

## 2024-01-08 PROCEDURE — 85025 COMPLETE CBC W/AUTO DIFF WBC: CPT

## 2024-01-08 PROCEDURE — 93005 ELECTROCARDIOGRAM TRACING: CPT

## 2024-01-11 ENCOUNTER — APPOINTMENT (OUTPATIENT)
Dept: CARDIOLOGY | Facility: MEDICAL CENTER | Age: 59
End: 2024-01-11
Attending: INTERNAL MEDICINE
Payer: COMMERCIAL

## 2024-01-11 ENCOUNTER — HOSPITAL ENCOUNTER (OUTPATIENT)
Facility: MEDICAL CENTER | Age: 59
End: 2024-01-11
Attending: INTERNAL MEDICINE | Admitting: INTERNAL MEDICINE
Payer: COMMERCIAL

## 2024-01-11 ENCOUNTER — ANESTHESIA EVENT (OUTPATIENT)
Dept: CARDIOLOGY | Facility: MEDICAL CENTER | Age: 59
End: 2024-01-11
Payer: COMMERCIAL

## 2024-01-11 ENCOUNTER — ANESTHESIA (OUTPATIENT)
Dept: CARDIOLOGY | Facility: MEDICAL CENTER | Age: 59
End: 2024-01-11
Payer: COMMERCIAL

## 2024-01-11 VITALS
SYSTOLIC BLOOD PRESSURE: 91 MMHG | OXYGEN SATURATION: 95 % | RESPIRATION RATE: 18 BRPM | DIASTOLIC BLOOD PRESSURE: 58 MMHG | HEIGHT: 69 IN | TEMPERATURE: 97.3 F | WEIGHT: 184.97 LBS | BODY MASS INDEX: 27.4 KG/M2 | HEART RATE: 78 BPM

## 2024-01-11 DIAGNOSIS — I48.0 PAROXYSMAL ATRIAL FIBRILLATION (HCC): ICD-10-CM

## 2024-01-11 LAB
ACT BLD: 298 SEC (ref 74–137)
ACT BLD: 304 SEC (ref 74–137)
EKG IMPRESSION: NORMAL

## 2024-01-11 PROCEDURE — 160002 HCHG RECOVERY MINUTES (STAT)

## 2024-01-11 PROCEDURE — 700101 HCHG RX REV CODE 250: Performed by: ANESTHESIOLOGY

## 2024-01-11 PROCEDURE — 93325 DOPPLER ECHO COLOR FLOW MAPG: CPT

## 2024-01-11 PROCEDURE — 700111 HCHG RX REV CODE 636 W/ 250 OVERRIDE (IP)

## 2024-01-11 PROCEDURE — 700101 HCHG RX REV CODE 250

## 2024-01-11 PROCEDURE — 93655 ICAR CATH ABLTJ DSCRT ARRHYT: CPT | Performed by: INTERNAL MEDICINE

## 2024-01-11 PROCEDURE — 85347 COAGULATION TIME ACTIVATED: CPT | Mod: 91

## 2024-01-11 PROCEDURE — 93623 PRGRMD STIMJ&PACG IV RX NFS: CPT

## 2024-01-11 PROCEDURE — 700111 HCHG RX REV CODE 636 W/ 250 OVERRIDE (IP): Performed by: ANESTHESIOLOGY

## 2024-01-11 PROCEDURE — 93656 COMPRE EP EVAL ABLTJ ATR FIB: CPT | Performed by: INTERNAL MEDICINE

## 2024-01-11 PROCEDURE — 93005 ELECTROCARDIOGRAM TRACING: CPT | Performed by: INTERNAL MEDICINE

## 2024-01-11 PROCEDURE — 160035 HCHG PACU - 1ST 60 MINS PHASE I

## 2024-01-11 PROCEDURE — 160046 HCHG PACU - 1ST 60 MINS PHASE II

## 2024-01-11 PROCEDURE — 160047 HCHG PACU  - EA ADDL 30 MINS PHASE II

## 2024-01-11 PROCEDURE — 93623 PRGRMD STIMJ&PACG IV RX NFS: CPT | Mod: 26 | Performed by: INTERNAL MEDICINE

## 2024-01-11 PROCEDURE — 93010 ELECTROCARDIOGRAM REPORT: CPT | Performed by: INTERNAL MEDICINE

## 2024-01-11 PROCEDURE — 700105 HCHG RX REV CODE 258: Performed by: INTERNAL MEDICINE

## 2024-01-11 RX ORDER — OXYCODONE HCL 5 MG/5 ML
5 SOLUTION, ORAL ORAL
Status: DISCONTINUED | OUTPATIENT
Start: 2024-01-11 | End: 2024-01-11 | Stop reason: HOSPADM

## 2024-01-11 RX ORDER — MEPERIDINE HYDROCHLORIDE 25 MG/ML
6.25 INJECTION INTRAMUSCULAR; INTRAVENOUS; SUBCUTANEOUS
Status: DISCONTINUED | OUTPATIENT
Start: 2024-01-11 | End: 2024-01-11 | Stop reason: HOSPADM

## 2024-01-11 RX ORDER — EPHEDRINE SULFATE 50 MG/ML
INJECTION, SOLUTION INTRAVENOUS PRN
Status: DISCONTINUED | OUTPATIENT
Start: 2024-01-11 | End: 2024-01-11 | Stop reason: HOSPADM

## 2024-01-11 RX ORDER — ISOPROTERENOL HYDROCHLORIDE 0.2 MG/ML
INJECTION, SOLUTION INTRAVENOUS
Status: COMPLETED
Start: 2024-01-11 | End: 2024-01-11

## 2024-01-11 RX ORDER — SODIUM CHLORIDE, SODIUM LACTATE, POTASSIUM CHLORIDE, CALCIUM CHLORIDE 600; 310; 30; 20 MG/100ML; MG/100ML; MG/100ML; MG/100ML
INJECTION, SOLUTION INTRAVENOUS CONTINUOUS
Status: DISCONTINUED | OUTPATIENT
Start: 2024-01-11 | End: 2024-01-11 | Stop reason: HOSPADM

## 2024-01-11 RX ORDER — HALOPERIDOL 5 MG/ML
1 INJECTION INTRAMUSCULAR
Status: DISCONTINUED | OUTPATIENT
Start: 2024-01-11 | End: 2024-01-11 | Stop reason: HOSPADM

## 2024-01-11 RX ORDER — HYDROMORPHONE HYDROCHLORIDE 1 MG/ML
0.2 INJECTION, SOLUTION INTRAMUSCULAR; INTRAVENOUS; SUBCUTANEOUS
Status: DISCONTINUED | OUTPATIENT
Start: 2024-01-11 | End: 2024-01-11 | Stop reason: HOSPADM

## 2024-01-11 RX ORDER — LIDOCAINE HYDROCHLORIDE 20 MG/ML
INJECTION, SOLUTION INFILTRATION; PERINEURAL
Status: COMPLETED
Start: 2024-01-11 | End: 2024-01-11

## 2024-01-11 RX ORDER — DEXAMETHASONE SODIUM PHOSPHATE 4 MG/ML
INJECTION, SOLUTION INTRA-ARTICULAR; INTRALESIONAL; INTRAMUSCULAR; INTRAVENOUS; SOFT TISSUE PRN
Status: DISCONTINUED | OUTPATIENT
Start: 2024-01-11 | End: 2024-01-11 | Stop reason: SURG

## 2024-01-11 RX ORDER — ONDANSETRON 2 MG/ML
4 INJECTION INTRAMUSCULAR; INTRAVENOUS
Status: DISCONTINUED | OUTPATIENT
Start: 2024-01-11 | End: 2024-01-11 | Stop reason: HOSPADM

## 2024-01-11 RX ORDER — HEPARIN SODIUM 1000 [USP'U]/ML
INJECTION, SOLUTION INTRAVENOUS; SUBCUTANEOUS
Status: COMPLETED
Start: 2024-01-11 | End: 2024-01-11

## 2024-01-11 RX ORDER — HYDROMORPHONE HYDROCHLORIDE 1 MG/ML
0.1 INJECTION, SOLUTION INTRAMUSCULAR; INTRAVENOUS; SUBCUTANEOUS
Status: DISCONTINUED | OUTPATIENT
Start: 2024-01-11 | End: 2024-01-11 | Stop reason: HOSPADM

## 2024-01-11 RX ORDER — LIDOCAINE HYDROCHLORIDE 40 MG/ML
SOLUTION TOPICAL
Status: COMPLETED
Start: 2024-01-11 | End: 2024-01-11

## 2024-01-11 RX ORDER — SODIUM CHLORIDE, SODIUM LACTATE, POTASSIUM CHLORIDE, CALCIUM CHLORIDE 600; 310; 30; 20 MG/100ML; MG/100ML; MG/100ML; MG/100ML
INJECTION, SOLUTION INTRAVENOUS CONTINUOUS
Status: ACTIVE | OUTPATIENT
Start: 2024-01-11 | End: 2024-01-11

## 2024-01-11 RX ORDER — HYDROMORPHONE HYDROCHLORIDE 1 MG/ML
0.4 INJECTION, SOLUTION INTRAMUSCULAR; INTRAVENOUS; SUBCUTANEOUS
Status: DISCONTINUED | OUTPATIENT
Start: 2024-01-11 | End: 2024-01-11 | Stop reason: HOSPADM

## 2024-01-11 RX ORDER — OXYCODONE HCL 5 MG/5 ML
10 SOLUTION, ORAL ORAL
Status: DISCONTINUED | OUTPATIENT
Start: 2024-01-11 | End: 2024-01-11 | Stop reason: HOSPADM

## 2024-01-11 RX ORDER — PROTAMINE SULFATE 10 MG/ML
INJECTION, SOLUTION INTRAVENOUS
Status: COMPLETED
Start: 2024-01-11 | End: 2024-01-11

## 2024-01-11 RX ORDER — ONDANSETRON 2 MG/ML
INJECTION INTRAMUSCULAR; INTRAVENOUS PRN
Status: DISCONTINUED | OUTPATIENT
Start: 2024-01-11 | End: 2024-01-11 | Stop reason: SURG

## 2024-01-11 RX ORDER — BUPIVACAINE HYDROCHLORIDE 5 MG/ML
INJECTION, SOLUTION EPIDURAL; INTRACAUDAL
Status: COMPLETED
Start: 2024-01-11 | End: 2024-01-11

## 2024-01-11 RX ORDER — DIPHENHYDRAMINE HYDROCHLORIDE 50 MG/ML
12.5 INJECTION INTRAMUSCULAR; INTRAVENOUS
Status: DISCONTINUED | OUTPATIENT
Start: 2024-01-11 | End: 2024-01-11 | Stop reason: HOSPADM

## 2024-01-11 RX ORDER — LIDOCAINE HYDROCHLORIDE 40 MG/ML
SOLUTION TOPICAL PRN
Status: DISCONTINUED | OUTPATIENT
Start: 2024-01-11 | End: 2024-01-11 | Stop reason: HOSPADM

## 2024-01-11 RX ORDER — HEPARIN SODIUM 200 [USP'U]/100ML
INJECTION, SOLUTION INTRAVENOUS
Status: COMPLETED
Start: 2024-01-11 | End: 2024-01-11

## 2024-01-11 RX ORDER — MIDAZOLAM HYDROCHLORIDE 1 MG/ML
INJECTION INTRAMUSCULAR; INTRAVENOUS
Status: COMPLETED
Start: 2024-01-11 | End: 2024-01-11

## 2024-01-11 RX ADMIN — ROCURONIUM BROMIDE 70 MG: 10 INJECTION, SOLUTION INTRAVENOUS at 12:55

## 2024-01-11 RX ADMIN — SODIUM CHLORIDE, POTASSIUM CHLORIDE, SODIUM LACTATE AND CALCIUM CHLORIDE: 600; 310; 30; 20 INJECTION, SOLUTION INTRAVENOUS at 12:51

## 2024-01-11 RX ADMIN — LIDOCAINE HYDROCHLORIDE: 20 INJECTION, SOLUTION INFILTRATION; PERINEURAL at 12:44

## 2024-01-11 RX ADMIN — SUGAMMADEX 200 MG: 100 INJECTION, SOLUTION INTRAVENOUS at 14:11

## 2024-01-11 RX ADMIN — PROPOFOL 200 MG: 10 INJECTION, EMULSION INTRAVENOUS at 12:55

## 2024-01-11 RX ADMIN — EPHEDRINE SULFATE 10 MG: 50 INJECTION, SOLUTION INTRAVENOUS at 14:02

## 2024-01-11 RX ADMIN — PROTAMINE SULFATE 50 MG: 10 INJECTION, SOLUTION INTRAVENOUS at 14:06

## 2024-01-11 RX ADMIN — DEXAMETHASONE SODIUM PHOSPHATE 4 MG: 4 INJECTION INTRA-ARTICULAR; INTRALESIONAL; INTRAMUSCULAR; INTRAVENOUS; SOFT TISSUE at 14:02

## 2024-01-11 RX ADMIN — ISOPROTERENOL HYDROCHLORIDE 0.2 MG: 0.2 INJECTION, SOLUTION INTRACARDIAC; INTRAMUSCULAR; INTRAVENOUS; SUBCUTANEOUS at 13:58

## 2024-01-11 RX ADMIN — HEPARIN SODIUM: 1000 INJECTION, SOLUTION INTRAVENOUS; SUBCUTANEOUS at 13:21

## 2024-01-11 RX ADMIN — ONDANSETRON 4 MG: 2 INJECTION INTRAMUSCULAR; INTRAVENOUS at 14:02

## 2024-01-11 RX ADMIN — MIDAZOLAM HYDROCHLORIDE 2 MG: 2 INJECTION, SOLUTION INTRAMUSCULAR; INTRAVENOUS at 12:52

## 2024-01-11 RX ADMIN — LIDOCAINE HYDROCHLORIDE 4 ML: 40 SOLUTION TOPICAL at 12:51

## 2024-01-11 RX ADMIN — BUPIVACAINE HYDROCHLORIDE: 5 INJECTION, SOLUTION EPIDURAL; INTRACAUDAL at 12:44

## 2024-01-11 RX ADMIN — HEPARIN SODIUM 6000 UNITS: 200 INJECTION, SOLUTION INTRAVENOUS at 12:56

## 2024-01-11 ASSESSMENT — PAIN DESCRIPTION - PAIN TYPE
TYPE: SURGICAL PAIN

## 2024-01-11 ASSESSMENT — FIBROSIS 4 INDEX
FIB4 SCORE: 0.83

## 2024-01-11 NOTE — ANESTHESIA PROCEDURE NOTES
Airway    Date/Time: 1/11/2024 12:57 PM    Performed by: Brent Kellogg M.D.  Authorized by: Brent Kellogg M.D.    Location:  OR  Urgency:  Elective  Difficult Airway: No    Indications for Airway Management:  Anesthesia      Spontaneous Ventilation: absent    Sedation Level:  Deep  Preoxygenated: Yes    Patient Position:  Sniffing  Mask Difficulty Assessment:  0 - not attempted  Final Airway Type:  Endotracheal airway  Final Endotracheal Airway:  ETT  Cuffed: Yes    Technique Used for Successful ETT Placement:  Direct laryngoscopy    Insertion Site:  Oral  Blade Type:  Jose Juan  Laryngoscope Blade/Videolaryngoscope Blade Size:  3  ETT Size (mm):  6.5  Measured from:  Teeth  ETT to Teeth (cm):  22  Placement Verified by: auscultation and capnometry    Cormack-Lehane Classification:  Grade I - full view of glottis  Number of Attempts at Approach:  1

## 2024-01-11 NOTE — ANESTHESIA TIME REPORT
Anesthesia Start and Stop Event Times       Date Time Event    1/11/2024 1240 Ready for Procedure     1251 Anesthesia Start     1420 Anesthesia Stop          Responsible Staff  01/11/24      Name Role Begin End    Brent Kellogg M.D. Anesth 1251 1420          Overtime Reason:  no overtime (within assigned shift)    Comments:

## 2024-01-11 NOTE — ANESTHESIA PROCEDURE NOTES
ENEDINA    Date/Time: 1/11/2024 1:16 PM    Performed by: Brent Kellogg M.D.  Authorized by: Brent Kellogg M.D.    Start Time:1/11/2024 1:16 PM  Preanesthetic Checklist: patient identified, IV checked, site marked, risks and benefits discussed, surgical consent, monitors and equipment checked, pre-op evaluation and timeout performed    Indication for ENEDINA: diagnostic   Patient Location: OR  Intubated: Yes  Bite Block: Yes  Heart Visualized: Yes  Insertion: atraumatic    **See FULL ENEDINA report in patient's chart via CV Synapse**

## 2024-01-11 NOTE — OP REPORT
Electrophysiology Procedure Note  [facility]    Procedures Performed:  Pulmonary Vein Isolation  Ablation of additional arrhythmia  Intracardiac Echocardiography  Three-dimensional intracardiac mapping  IV isoproterenol infusion with programmed stimulation  Trans-esophageal echocardiogram    Electrophysiologist: Mahendra Mei MD    Assistant(s): None    Anesthesia: General anesthesia was provided by Dr. Kellogg of the Anesthesiology service.    Statement of Medical Necessity: This is a 58  year-old female with history of symptomatic paroxysmal atrial  fibrillation     Pre-procedure ECG: Sinus     Post-procedure ECG: Sinus     Description of Procedure:    Access and catheter placement: After obtaining informed written consent, the patient was  brought to the EP lab in the fasting, non-sedated stated. The patient was sedated and intubated  by the anesthesiologist. The patient was prepped and draped in the usual sterile fashion. Using  the modified Seldinger technique, access was obtained in the right  femoral vein. Guidewires were advanced into the IVC. In the right femoral vein, 3 sheaths of   8F were placed. A deflectable  decapolar catheter was advanced through the LFV to the coronary sinus. A 8F intracardiac  echo catheter was advanced to the right atrium. Through an 8F sheath,  a long wire was advanced to the SVC. The short sheath was  changed out for a medium-curl Vizigo (Biosense Briceño) sheath which was advanced to the SVC. The wire was  removed and the dilator was flushed. A 98-cm transseptal needle ([employer]) was advanced to the tip of the  dilator. The transseptal needle was attached to the manifold and  flushed. Under intracardiac echocardiographic guidance, the sheath/needle   system was withdrawn to the fossa ovalis. At this time, 13,000 units of heparin were administered.   Additional boluses of heparin were given as needed to keep -350 sec during LA dwell time.   The needle was  advanced out of the dilator, and RF energy applied via the Crescent needle.   ICE visualized the needle passage through the fossa ovalis into the left  atrium. Confirmation of left atrial location was confirmed by injecting saline and transducing  pressure. The dilator was advanced over the needle into the left atrium, and then the sheath was advanced over the dilator. The dilator and  needle were removed. The sheath was flushed and connected to a continuous heparinized   saline drip.       A duodecapolar Penta-Ray catheter ([employer]Brice[employer]) was  advanced through the Vizigo sheath into the left atrium. A 3-dimensional electroanatomical  map was constructed using the CARTO system. The Penta-ray was removed and a 3.5-mm externally-irrigated ablation catheter (Biosense-Briceño   Thermocool ST SF DF-curve) was advanced through the Vizigo sheath into the left atrium.     Attention was first turned to the left pulmonary veins. The Penta-Ray  catheter was placed in the LSPV and LIPV which showed conduction into the vein. A circumferential  ablation line using radiofrequency energy 20-40W was placed which resulted in LSPV and LIPV isolation.  The catheters were then moved to the RSPV and RIPV which showed evidence of conduction into the veins,   and ablation 20-40W was performed circumferentially around these veins   resulting in RSPV and RIPV isolation. Power was reduced near the esophagus.  The Penta-Ray was advanced into all four pulmonary veins and persistent conduction block into the right and left pulmonary veins was demonstrated.     During the PVI portion of the case the pt would notable go in and out of atypical atrial flutter. A roof line was performed at 45W from LSPV to RSPV to create a line of block confirmed with differential pacing.    Isuprel 2-4mcg/min was administered and burst pacing was performed in the left atrium without induction of sustained AF, AFL, or SVT.     The left atrial sheath and catheter  were withdrawn to the right atrium.   ICE visualization   of the pericardium confirmed no pericardial effusion at the end of the case. The  patient was awakened from anesthesia, catheters and sheaths were removed, Vascade MVP closure devices were deployed, and manual  pressure was held on bilateral groins until hemostasis was achieved.     Electrophysiological Findings:  Sinus cycle length 1014 msec  Intervals- A-H 50 msec  H-V 70 msec   msec   msec   msec  AV block  ms  AVERP: 500/240 ms    Total RF time: 1079 sec  Fluoro time: 0 min      Estimated blood loss - 30 mL    Complications: None    Impression:  1. Atrial fibrillation, paroxysmal  2. Successful isolation of all four pulmonary veins  3. Atypical atrial flutter with successful LA roof ablation    Recommendations:  1. Bed rest for 2 hours  2. Telemetry monitoring during recovery  3. Anticoagulant therapy for at least 2 months  4. Follow up in Arrhythmia clinic in 4 weeks. Discontinue flecainide at 3-4 weeks if no further afib.      Mahendra Mei MD  Cardiac Electrophysiology

## 2024-01-11 NOTE — H&P
[facility]  Electrophysiology Pre-procedure H&P    DOS:1/11/2024    Planned Procedure: Afib ablation    Chief complaint/Reason for Procedure: Afib    HPI: 59 y/o F with pAF for ablation      Past Medical History:   Diagnosis Date    Atrial fibrillation (HCC)     High cholesterol     Controlled with medication    Hyperlipidemia     Hypothyroidism     Sleep apnea     cpap    Snoring     Thyroid disease        Past Surgical History:   Procedure Laterality Date    NO PERTINENT PAST SURGICAL HISTORY      No prior surgeries       Social History     Socioeconomic History    Marital status:      Spouse name: Not on file    Number of children: Not on file    Years of education: Not on file    Highest education level: Not on file   Occupational History    Not on file   Tobacco Use    Smoking status: Never    Smokeless tobacco: Never   Vaping Use    Vaping Use: Never used   Substance and Sexual Activity    Alcohol use: Yes     Alcohol/week: 4.2 - 5.4 oz     Types: 1 Glasses of wine, 6 - 8 Standard drinks or equivalent per week     Comment: 4-5 a week    Drug use: No    Sexual activity: Not on file   Other Topics Concern    Not on file   Social History Narrative    ** Merged History Encounter **          Social Determinants of Health     Financial Resource Strain: Not on file   Food Insecurity: Not on file   Transportation Needs: Not on file   Physical Activity: Not on file   Stress: Not on file   Social Connections: Not on file   Intimate Partner Violence: Not on file   Housing Stability: Not on file       Family History   Problem Relation Age of Onset    Thyroid Mother     Heart Disease Mother         Afib, TAVR    Hypertension Mother     Other Mother     Diabetes Father     Thyroid Sister     Heart Disease Brother     Sleep Apnea Brother     Hyperlipidemia Brother     Heart Attack Maternal Grandmother     Heart Attack Brother         ? MI (69)    Arrythmia Brother        Allergies   Allergen  "Reactions    Codeine      rash       Current Facility-Administered Medications   Medication Dose Route Frequency Provider Last Rate Last Admin    lidocaine (Xylocaine) 1 % injection 0.5 mL  0.5 mL Intradermal Once PRN Mahendra Mei M.D.        lactated ringers infusion   Intravenous Continuous Mahendra Mei M.D.        BUPIVACAINE HCL (PF) 0.5 % INJ SOLN             HEPARIN SODIUM (PORCINE) 1000 UNIT/ML INJ SOLN             LIDOCAINE HCL 2 % INJ SOLN             HEPARIN (PORCINE) IN NACL 2000-0.9 UNIT/L-% IV SOLN                Physical Exam:  Vitals:    01/11/24 1134 01/11/24 1135 01/11/24 1145   BP:   129/65   Pulse:   65   Resp:   18   Temp:   36.7 °C (98 °F)   TempSrc:   Temporal   SpO2:   96%   Weight: 84.8 kg (186 lb 15.2 oz) 84.8 kg (186 lb 15.2 oz) 83.9 kg (184 lb 15.5 oz)   Height:   1.753 m (5' 9\")     General appearance: NAD, conversant   Neck: Trachea midline; FROM, supple, no thyromegaly or lymphadenopathy  CV: RRR, no MRGs, no JVD   Extremities: No peripheral edema or extremity lymphadenopathy  Skin: Normal temperature, turgor and texture; no rash, ulcers or subcutaneous nodules  Psych: Appropriate affect, alert and oriented to person, place and time    Data:  Lab Results   Component Value Date/Time    CHOLSTRLTOT 154 08/14/2023 08:09 AM    LDL 77 08/14/2023 08:09 AM    HDL 67 08/14/2023 08:09 AM    TRIGLYCERIDE 52 08/14/2023 08:09 AM       Lab Results   Component Value Date/Time    SODIUM 139 01/08/2024 08:55 AM    POTASSIUM 4.0 01/08/2024 08:55 AM    CHLORIDE 104 01/08/2024 08:55 AM    CO2 25 01/08/2024 08:55 AM    GLUCOSE 91 01/08/2024 08:55 AM    BUN 12 01/08/2024 08:55 AM    CREATININE 0.56 01/08/2024 08:55 AM     Lab Results   Component Value Date/Time    ALKPHOSPHAT 106 (H) 01/08/2024 08:55 AM    ASTSGOT 20 01/08/2024 08:55 AM    ALTSGPT 29 01/08/2024 08:55 AM    TBILIRUBIN 0.7 01/08/2024 08:55 AM      No results found for: \"BNPBTYPENAT\"            EKG interpreted by me: " NSR    Impression/Plan:  1) pAF    Plan AF ablation. We discussed pulmonary vein isolation for therapeutic management and continued rhythm control.  We discussed the risks and benefits of this procedure.  Risks include 1-3% risk of major cardiovascular event including stroke, myocardial infarction, phrenic nerve damage, esophageal injury and/or fistula formation, cardiac perforation, pericardial effusion, tamponade, major bleeding, or death.  I quoted a 70 to 80% chance free of atrial fibrillation at 12 months.  We discussed that he may also need a second procedure.        Mahendra Mei MD  Cardiac Electrophysiology

## 2024-01-11 NOTE — ANESTHESIA PREPROCEDURE EVALUATION
" Date/Time: 01/11/24 1330    Scheduled providers: Mahendra Mei M.D.; Brent Kellogg M.D.    Procedure: CL-EP ABLATION ATRIAL FIBRILLATION    Diagnosis:       Paroxysmal atrial fibrillation (HCC) [I48.0]      Paroxysmal atrial fibrillation [I48.0]    Indications: See Associated Dx    Location: [facility] - Cath Lab - [facility]            Relevant Problems   ANESTHESIA   (positive) Obstructive sleep apnea syndrome      CARDIAC   (positive) Agatston CAC score 200-399   (positive) Atherosclerosis of both carotid arteries   (positive) Atrial fibrillation (HCC)   (positive) Coronary artery calcification seen on CT scan      ENDO   (positive) Hypothyroidism     /65   Pulse 65   Temp 36.7 °C (98 °F) (Temporal)   Resp 18   Ht 1.753 m (5' 9\")   Wt 83.9 kg (184 lb 15.5 oz)   SpO2 96%   BMI 27.31 kg/m²     Physical Exam    Airway   Mallampati: II  TM distance: >3 FB  Neck ROM: full       Cardiovascular - normal exam  Rhythm: regular  Rate: normal  (-) murmur     Dental - normal exam           Pulmonary - normal exam  Breath sounds clear to auscultation     Abdominal    Neurological - normal exam                   Anesthesia Plan    ASA 3       Plan - general       Airway plan will be ETT  ENEDINA Planned        Induction: intravenous    Postoperative Plan: Postoperative administration of opioids is intended.    Pertinent diagnostic labs and testing reviewed    Informed Consent:    Anesthetic plan and risks discussed with patient.    Use of blood products discussed with: patient whom consented to blood products.           "

## 2024-01-11 NOTE — ANESTHESIA POSTPROCEDURE EVALUATION
Patient: Zehra Adam    Procedure Summary       Date: 01/11/24 Room / Location: [facility] Cath [facility]    Anesthesia Start: 1251 Anesthesia Stop: 1420    Procedure: CL-EP ABLATION ATRIAL FIBRILLATION Diagnosis:       Paroxysmal atrial fibrillation (HCC)      Paroxysmal atrial fibrillation      (See Associated Dx)    Scheduled Providers: Mahendra Mei M.D.; Brent Kellogg M.D. Responsible Provider: Brent Kellogg M.D.    Anesthesia Type: general ASA Status: 3            Final Anesthesia Type: general  Last vitals  BP   Blood Pressure: 129/65    Temp   36.7 °C (98 °F)    Pulse   65   Resp   18    SpO2   96 %      Anesthesia Post Evaluation    Patient location during evaluation: PACU  Patient participation: complete - patient participated  Level of consciousness: awake and alert    Airway patency: patent  Anesthetic complications: no  Cardiovascular status: hemodynamically stable  Respiratory status: face mask    PONV: none          No notable events documented.

## 2024-01-12 NOTE — OR NURSING
1413 Arrived from cath lab ID. Verified. Report received attached to monitors. Right groin access site covered with gauze and Tegaderm clean dry intact and soft.     1435  at the bedside updated plan of care.     1552 Dr valle at the bedside talking to patient.    1613 DR frank at the bedside talking to patient.     1630 Ambulated to bathroom independently tolerated well. Right groin access site clean dry intact and soft.     1701 Tolerating PO fluids.     1815 Discharge instructions given to patient and family verbalize understanding of the orders. Copy of instructions given to patient.     1823 Escorted via w/c to responsible adult with all personal belongings.

## 2024-01-12 NOTE — DISCHARGE INSTRUCTIONS
Post Ablation    Patient Care Instructions  1. No lifting > 10 lbs x 1 week.      2. No soaking in baths, hot tubs, pools x 1 week.  May shower the day after discharge and take off groin dressings and leave uncovered.  Continue to monitor sites daily for warmth, redness, discolored drainage.  It is common to have a small lump in the area where the cather was (usually the size of a small marble); this will go away but takes approximately 6 weeks to normalize.     3.   Please take all medications as prescribed to you; please do not stop any medications prescribed post ablation unless directed by your healthcare provider.      4.   Please do not miss any doses of your blood thinner (if you have been started on, or take chronic blood thinners) without discussion with your healthcare provider first.     5.   Please walk and take deep breaths after discharge.  After discharge, if  experiences neurological changes/signs of stroke, high fever, you should be seen in the emergency dept.     6.   It is possible you may experience some chest discomfort or chest tightness post ablation.  This is usually secondary to inflammation and irritation of the tissues at the area of the ablation.  If this occurs, it is advised to try 400 mg of Ibuprofen with food as needed up to three times a day for a maximum of two days.  This should help to decrease pain and tissue inflammation.          **Please notify the office ([phone]) if this occurs.         ** DO NOT TAKE Ibuprofen IF HISTORY OF SIGNIFICANT BLEEDING OR KIDNEY DISEASE WITHOUT DISCUSSING WITH YOUR CARDIOLOGY PROVIDER FIRST.          ** If pain becomes severe or you have additional symptoms you may need to be medically evaluated; please contact the cardiology office ([phone]) for further direction.     7. It is possible that you may experience arrhythmia/Atrial Fibrillation post ablation.  This is secondary to irritation and inflammation of the cardiac tissues from the  ablation.  If you have atrial fibrillation all day or feel poorly with it, please notify your cardiologist's via phone ([phone]) or [employer].      8.  Please contact call our office ([phone]) or message via [employer] message if you have any questions or concerns post procedurally.    9. You need to be seen for post ablation follow up 2-4 weeks post procedure.  If you do not have a follow up appointment  already scheduled, please call [phone] to schedule your follow up appointment.

## 2024-01-15 LAB — LV EJECT FRACT  99904: 55

## 2024-01-30 ENCOUNTER — HOSPITAL ENCOUNTER (OUTPATIENT)
Dept: LAB | Facility: MEDICAL CENTER | Age: 59
End: 2024-01-30
Attending: FAMILY MEDICINE
Payer: COMMERCIAL

## 2024-01-30 LAB
25(OH)D3 SERPL-MCNC: 26 NG/ML (ref 30–100)
ALBUMIN SERPL BCP-MCNC: 4.2 G/DL (ref 3.2–4.9)
ALBUMIN/GLOB SERPL: 1.7 G/DL
ALP SERPL-CCNC: 104 U/L (ref 30–99)
ALT SERPL-CCNC: 36 U/L (ref 2–50)
ANION GAP SERPL CALC-SCNC: 9 MMOL/L (ref 7–16)
AST SERPL-CCNC: 30 U/L (ref 12–45)
BASOPHILS # BLD AUTO: 1.3 % (ref 0–1.8)
BASOPHILS # BLD: 0.05 K/UL (ref 0–0.12)
BILIRUB SERPL-MCNC: 0.5 MG/DL (ref 0.1–1.5)
BUN SERPL-MCNC: 11 MG/DL (ref 8–22)
CALCIUM ALBUM COR SERPL-MCNC: 8.9 MG/DL (ref 8.5–10.5)
CALCIUM SERPL-MCNC: 9.1 MG/DL (ref 8.5–10.5)
CHLORIDE SERPL-SCNC: 106 MMOL/L (ref 96–112)
CHOLEST SERPL-MCNC: 168 MG/DL (ref 100–199)
CO2 SERPL-SCNC: 25 MMOL/L (ref 20–33)
CREAT SERPL-MCNC: 0.53 MG/DL (ref 0.5–1.4)
EOSINOPHIL # BLD AUTO: 0.07 K/UL (ref 0–0.51)
EOSINOPHIL NFR BLD: 1.9 % (ref 0–6.9)
ERYTHROCYTE [DISTWIDTH] IN BLOOD BY AUTOMATED COUNT: 42.7 FL (ref 35.9–50)
FASTING STATUS PATIENT QL REPORTED: NORMAL
GFR SERPLBLD CREATININE-BSD FMLA CKD-EPI: 105 ML/MIN/1.73 M 2
GLOBULIN SER CALC-MCNC: 2.5 G/DL (ref 1.9–3.5)
GLUCOSE SERPL-MCNC: 86 MG/DL (ref 65–99)
HCT VFR BLD AUTO: 40.7 % (ref 37–47)
HDLC SERPL-MCNC: 72 MG/DL
HGB BLD-MCNC: 13.8 G/DL (ref 12–16)
IMM GRANULOCYTES # BLD AUTO: 0 K/UL (ref 0–0.11)
IMM GRANULOCYTES NFR BLD AUTO: 0 % (ref 0–0.9)
LDLC SERPL CALC-MCNC: 86 MG/DL
LYMPHOCYTES # BLD AUTO: 1.42 K/UL (ref 1–4.8)
LYMPHOCYTES NFR BLD: 38 % (ref 22–41)
MCH RBC QN AUTO: 30.6 PG (ref 27–33)
MCHC RBC AUTO-ENTMCNC: 33.9 G/DL (ref 32.2–35.5)
MCV RBC AUTO: 90.2 FL (ref 81.4–97.8)
MONOCYTES # BLD AUTO: 0.39 K/UL (ref 0–0.85)
MONOCYTES NFR BLD AUTO: 10.4 % (ref 0–13.4)
NEUTROPHILS # BLD AUTO: 1.81 K/UL (ref 1.82–7.42)
NEUTROPHILS NFR BLD: 48.4 % (ref 44–72)
NRBC # BLD AUTO: 0 K/UL
NRBC BLD-RTO: 0 /100 WBC (ref 0–0.2)
PLATELET # BLD AUTO: 252 K/UL (ref 164–446)
PMV BLD AUTO: 10.6 FL (ref 9–12.9)
POTASSIUM SERPL-SCNC: 4.4 MMOL/L (ref 3.6–5.5)
PROT SERPL-MCNC: 6.7 G/DL (ref 6–8.2)
RBC # BLD AUTO: 4.51 M/UL (ref 4.2–5.4)
SODIUM SERPL-SCNC: 140 MMOL/L (ref 135–145)
TRIGL SERPL-MCNC: 52 MG/DL (ref 0–149)
TSH SERPL DL<=0.005 MIU/L-ACNC: 3.2 UIU/ML (ref 0.38–5.33)
WBC # BLD AUTO: 3.7 K/UL (ref 4.8–10.8)

## 2024-01-30 PROCEDURE — 36415 COLL VENOUS BLD VENIPUNCTURE: CPT

## 2024-01-30 PROCEDURE — 85025 COMPLETE CBC W/AUTO DIFF WBC: CPT

## 2024-01-30 PROCEDURE — 82306 VITAMIN D 25 HYDROXY: CPT

## 2024-01-30 PROCEDURE — 80061 LIPID PANEL: CPT

## 2024-01-30 PROCEDURE — 80053 COMPREHEN METABOLIC PANEL: CPT

## 2024-01-30 PROCEDURE — 84443 ASSAY THYROID STIM HORMONE: CPT

## 2024-02-01 ENCOUNTER — OFFICE VISIT (OUTPATIENT)
Dept: CARDIOLOGY | Facility: MEDICAL CENTER | Age: 59
End: 2024-02-01
Attending: NURSE PRACTITIONER
Payer: COMMERCIAL

## 2024-02-01 VITALS
BODY MASS INDEX: 27.85 KG/M2 | OXYGEN SATURATION: 97 % | HEART RATE: 75 BPM | DIASTOLIC BLOOD PRESSURE: 72 MMHG | WEIGHT: 188 LBS | RESPIRATION RATE: 14 BRPM | SYSTOLIC BLOOD PRESSURE: 116 MMHG | HEIGHT: 69 IN

## 2024-02-01 DIAGNOSIS — I48.0 PAROXYSMAL ATRIAL FIBRILLATION (HCC): ICD-10-CM

## 2024-02-01 DIAGNOSIS — I65.23 ATHEROSCLEROSIS OF BOTH CAROTID ARTERIES: ICD-10-CM

## 2024-02-01 DIAGNOSIS — R93.1 AGATSTON CAC SCORE 200-399: ICD-10-CM

## 2024-02-01 DIAGNOSIS — E78.2 MIXED HYPERLIPIDEMIA: ICD-10-CM

## 2024-02-01 DIAGNOSIS — Z79.01 ANTICOAGULATED: ICD-10-CM

## 2024-02-01 DIAGNOSIS — D68.69 HYPERCOAGULABLE STATE DUE TO PAROXYSMAL ATRIAL FIBRILLATION (HCC): ICD-10-CM

## 2024-02-01 DIAGNOSIS — I48.0 HYPERCOAGULABLE STATE DUE TO PAROXYSMAL ATRIAL FIBRILLATION (HCC): ICD-10-CM

## 2024-02-01 PROCEDURE — 99214 OFFICE O/P EST MOD 30 MIN: CPT | Performed by: NURSE PRACTITIONER

## 2024-02-01 PROCEDURE — 99212 OFFICE O/P EST SF 10 MIN: CPT | Performed by: NURSE PRACTITIONER

## 2024-02-01 PROCEDURE — 3074F SYST BP LT 130 MM HG: CPT | Performed by: NURSE PRACTITIONER

## 2024-02-01 PROCEDURE — 93005 ELECTROCARDIOGRAM TRACING: CPT | Performed by: NURSE PRACTITIONER

## 2024-02-01 PROCEDURE — 3078F DIAST BP <80 MM HG: CPT | Performed by: NURSE PRACTITIONER

## 2024-02-01 RX ORDER — FLECAINIDE ACETATE 50 MG/1
75 TABLET ORAL 2 TIMES DAILY
Qty: 270 TABLET | Refills: 1 | Status: SHIPPED | OUTPATIENT
Start: 2024-02-01

## 2024-02-01 ASSESSMENT — FIBROSIS 4 INDEX: FIB4 SCORE: 1.190476190476190476

## 2024-02-01 NOTE — PROGRESS NOTES
Chief Complaint   Patient presents with    Atrial Fibrillation     F/V Dx: Paroxysmal atrial fibrillation (HCC)       Subjective     Zehra Adam is a 58 y.o. female who presents today for follow up after undergoing AF ablation with Dr. Mei on 1/11/2024.       Today patient states that she is feeling well. Denies any known recurrence of arrhyhtmia. Reports that her femoral access site is well healed. Interested in trying to start working out again. She is tolerating OAC well with no bleeding problems.      Past Medical History:   Diagnosis Date    Atrial fibrillation (HCC)     High cholesterol     Controlled with medication    Hyperlipidemia     Hypothyroidism     Sleep apnea     cpap    Snoring     Thyroid disease      Past Surgical History:   Procedure Laterality Date    NO PERTINENT PAST SURGICAL HISTORY      No prior surgeries     Family History   Problem Relation Age of Onset    Thyroid Mother     Heart Disease Mother         Afib, TAVR    Hypertension Mother     Other Mother     Diabetes Father     Thyroid Sister     Heart Disease Brother     Sleep Apnea Brother     Hyperlipidemia Brother     Heart Attack Maternal Grandmother     Heart Attack Brother         ? MI (69)    Arrythmia Brother      Social History     Socioeconomic History    Marital status:      Spouse name: Not on file    Number of children: Not on file    Years of education: Not on file    Highest education level: Not on file   Occupational History    Not on file   Tobacco Use    Smoking status: Never    Smokeless tobacco: Never   Vaping Use    Vaping Use: Never used   Substance and Sexual Activity    Alcohol use: Yes     Alcohol/week: 4.2 - 5.4 oz     Types: 1 Glasses of wine, 6 - 8 Standard drinks or equivalent per week     Comment: 4-5 a week    Drug use: No    Sexual activity: Not on file   Other Topics Concern    Not on file   Social History Narrative    ** Merged History Encounter **          Social Determinants of  "Health     Financial Resource Strain: Not on file   Food Insecurity: Not on file   Transportation Needs: Not on file   Physical Activity: Not on file   Stress: Not on file   Social Connections: Not on file   Intimate Partner Violence: Not on file   Housing Stability: Not on file     Allergies   Allergen Reactions    Codeine      rash     Outpatient Encounter Medications as of 2/1/2024   Medication Sig Dispense Refill    apixaban (ELIQUIS) 5mg Tab Take 1 Tablet by mouth 2 times a day. 60 Tablet 1    flecainide (TAMBOCOR) 50 MG tablet Take 1.5 Tablets by mouth 2 times a day. 270 Tablet 1    atorvastatin (LIPITOR) 20 MG Tab Take 1 Tablet by mouth every evening. 90 Tablet 3    metoprolol tartrate (LOPRESSOR) 25 MG Tab Take 1 Tablet by mouth every 6 hours as needed (palps). 90 Tablet 3    levothyroxine (SYNTHROID) 75 MCG Tab Take 75 mcg by mouth every morning on an empty stomach.      Multiple Vitamin (MULTI-VITAMIN PO) Take 1 Tablet by mouth as needed.      aspirin EC (ECOTRIN) 81 MG Tablet Delayed Response Take 1 Tab by mouth every day. (Patient taking differently: Take 81 mg by mouth every evening.) 90 Tab 3    [DISCONTINUED] apixaban (ELIQUIS) 5mg Tab Take 1 Tablet by mouth 2 times a day. 60 Tablet 1    [DISCONTINUED] flecainide (TAMBOCOR) 50 MG tablet TAKE 1 AND 1/2 TABLET BY MOUTH TWICE A  Tablet 1     No facility-administered encounter medications on file as of 2/1/2024.     Review of Systems   Constitutional:  Negative for malaise/fatigue and weight loss.   Respiratory:  Negative for shortness of breath.    Cardiovascular:  Negative for chest pain, palpitations, orthopnea, claudication, leg swelling and PND.   Neurological:  Negative for dizziness and weakness.   All other systems reviewed and are negative.             Objective     /72 (BP Location: Left arm, Patient Position: Sitting, BP Cuff Size: Adult)   Pulse 75   Resp 14   Ht 1.753 m (5' 9\")   Wt 85.3 kg (188 lb)   SpO2 97%   BMI 27.76 " Past Surgical History:   Procedure Laterality Date    BACK SURGERY      CARDIOVASCULAR STRESS TEST  09/26/2017     History reviewed. No pertinent family history. Social History     Social History    Marital status: Legally      Spouse name: N/A    Number of children: N/A    Years of education: N/A     Occupational History    Not on file. Social History Main Topics    Smoking status: Current Every Day Smoker     Packs/day: 1.00    Smokeless tobacco: Never Used    Alcohol use Yes      Comment: 2-3 beers a day    Drug use: No    Sexual activity: Not Currently     Other Topics Concern    Not on file     Social History Narrative          Vitals:    10/03/17 0942   BP: 132/86   Weight: 135 lb (61.2 kg)   Height: 5' 6.14\" (1.68 m)     Body mass index is 21.7 kg/(m^2). Wt Readings from Last 3 Encounters:   10/03/17 135 lb (61.2 kg)   09/27/17 134 lb (60.8 kg)   09/26/17 119 lb 0.8 oz (54 kg)     BP Readings from Last 3 Encounters:   10/03/17 132/86   09/27/17 124/70   09/26/17 (!) 157/86          REVIEW OF SYSTEMS:   · 10 point review of systems performed; please refer to HPI with pertinent positives, all other ROS are negative    PHYSICAL EXAM:    CONSTITUTIONAL:  awake, alert, no apparent distress and normal weight  ENT:  normocepalic, without obvious abnormality  NECK:  supple, symmetrical, trachea midline   LUNGS:  Resp easy and unlabored  CARDIOVASCULAR:  regular rate and rhythm  ABDOMEN:  normal bowel sounds, soft, non-distended, non-tender, voluntary guarding absent,  Examination for hernias: bilateral inguinal hernia, reducible, tender. MUSCULOSKELETAL: No edema  NEUROLOGIC:  Mental Status Exam:  Level of Alertness:   awake  Orientation:   person, place, time      DATA:  NA    ASSESSMENT:    1. Bilateral inguinal hernia without obstruction or gangrene, recurrence not specified           PLAN:    We will schedule the pt for Robotic bilateral inguinal hernia repair.  The technical aspects, risks, benefits and complications of the procedure were discussed with the patient. The pt appears to understand, asks appropriate questions, and agrees to proceed with the procedure.        AWILDA SIMMONS kg/m²     Physical Exam  Constitutional:       General: She is not in acute distress.     Appearance: She is well-developed.   HENT:      Head: Normocephalic.   Eyes:      Extraocular Movements: Extraocular movements intact.   Neck:      Vascular: Carotid bruit (Right) present. No JVD.   Cardiovascular:      Rate and Rhythm: Normal rate and regular rhythm.      Heart sounds: Normal heart sounds. No murmur heard.  Pulmonary:      Effort: No respiratory distress.      Breath sounds: Normal breath sounds.   Musculoskeletal:      Cervical back: Normal range of motion.      Right lower leg: No edema.      Left lower leg: No edema.   Skin:     General: Skin is warm and dry.   Neurological:      Mental Status: She is alert and oriented to person, place, and time.   Psychiatric:         Behavior: Behavior normal.                Assessment & Plan     1. Paroxysmal atrial fibrillation (HCC)  EKG    apixaban (ELIQUIS) 5mg Tab    flecainide (TAMBOCOR) 50 MG tablet      2. Mixed hyperlipidemia        3. Agatston CAC score 200-399        4. Hypercoagulable state due to paroxysmal atrial fibrillation (HCC)        5. Anticoagulated        6. Atherosclerosis of both carotid arteries            Medical Decision Making: Today's Assessment/Status/Plan:        pAF:  - S/P AF (PVI) with AFL (LA roof) ablation with Dr. Mei on 1/11/2024.   - Maintaining NSR.  - Patient will titrate off of Flecainide. Advised her to call should she have AF recurrence.   - [id number] score is 1. Continue Eliquis 5 mg BID for at least 2 months post ablation. Risks and benefits of OAC discussed, patient would like to stop OAC after 2 months if she remains AF free and resume ASA 81 mg daily. Patient is very symptomatic with AF and will resume OAC if she has recurrence and call out office ASAP.    HLD:  - Known bilateral mild carotid plaque.   - Coronary calcium score in 2021 was 243.   - LDL remains mildly above goal (86).  - Discussed increasing Lipitor,  patient would like to think about it. Continue Lipitor 20 mg daily. Resume ASA 81 mg daily when she stops the Eliquis.     Patient will follow up with Dr. Mei as scheduled below or earlier if needed. Encouraged patient to call our office should any questions or concerns arise in the mean time.     Future Appointments   Date Time Provider Department Center   2/14/2024  7:40 AM KAITLYN WHEELER [facility]   4/9/2024  1:40 PM Mahendra Mei M.D. [facility] None   8/12/2024  8:40 AM Josué Cooper M.D. [facility] None

## 2024-02-02 ENCOUNTER — PATIENT MESSAGE (OUTPATIENT)
Dept: CARDIOLOGY | Facility: MEDICAL CENTER | Age: 59
End: 2024-02-02
Payer: COMMERCIAL

## 2024-02-02 ASSESSMENT — ENCOUNTER SYMPTOMS
PALPITATIONS: 0
CLAUDICATION: 0
ORTHOPNEA: 0
PND: 0
DIZZINESS: 0
SHORTNESS OF BREATH: 0
WEAKNESS: 0
WEIGHT LOSS: 0

## 2024-02-06 ENCOUNTER — APPOINTMENT (RX ONLY)
Dept: URBAN - METROPOLITAN AREA CLINIC 6 | Facility: CLINIC | Age: 59
Setting detail: DERMATOLOGY
End: 2024-02-06

## 2024-02-06 DIAGNOSIS — Z71.89 OTHER SPECIFIED COUNSELING: ICD-10-CM

## 2024-02-06 DIAGNOSIS — Z80.8 FAMILY HISTORY OF MALIGNANT NEOPLASM OF OTHER ORGANS OR SYSTEMS: ICD-10-CM

## 2024-02-06 DIAGNOSIS — L82.1 OTHER SEBORRHEIC KERATOSIS: ICD-10-CM

## 2024-02-06 DIAGNOSIS — L81.4 OTHER MELANIN HYPERPIGMENTATION: ICD-10-CM

## 2024-02-06 DIAGNOSIS — D22 MELANOCYTIC NEVI: ICD-10-CM

## 2024-02-06 DIAGNOSIS — D18.0 HEMANGIOMA: ICD-10-CM

## 2024-02-06 DIAGNOSIS — L57.0 ACTINIC KERATOSIS: ICD-10-CM

## 2024-02-06 PROBLEM — D18.01 HEMANGIOMA OF SKIN AND SUBCUTANEOUS TISSUE: Status: ACTIVE | Noted: 2024-02-06

## 2024-02-06 PROBLEM — D22.5 MELANOCYTIC NEVI OF TRUNK: Status: ACTIVE | Noted: 2024-02-06

## 2024-02-06 PROCEDURE — ? EDUCATIONAL RESOURCES PROVIDED

## 2024-02-06 PROCEDURE — ? LIQUID NITROGEN

## 2024-02-06 PROCEDURE — ? COUNSELING

## 2024-02-06 PROCEDURE — 99213 OFFICE O/P EST LOW 20 MIN: CPT | Mod: 25

## 2024-02-06 PROCEDURE — ? PHOTODYNAMIC THERAPY COUNSELING

## 2024-02-06 PROCEDURE — ? ADDITIONAL NOTES

## 2024-02-06 PROCEDURE — ? SUNSCREEN RECOMMENDATIONS

## 2024-02-06 PROCEDURE — 17000 DESTRUCT PREMALG LESION: CPT

## 2024-02-06 PROCEDURE — 17003 DESTRUCT PREMALG LES 2-14: CPT

## 2024-02-06 ASSESSMENT — LOCATION ZONE DERM
LOCATION ZONE: ARM
LOCATION ZONE: NOSE
LOCATION ZONE: FACE
LOCATION ZONE: HAND
LOCATION ZONE: LEG
LOCATION ZONE: TRUNK

## 2024-02-06 ASSESSMENT — LOCATION DETAILED DESCRIPTION DERM
LOCATION DETAILED: LEFT INFERIOR MEDIAL MALAR CHEEK
LOCATION DETAILED: LEFT SUPERIOR MEDIAL FOREHEAD
LOCATION DETAILED: MIDDLE STERNUM
LOCATION DETAILED: PERIUMBILICAL SKIN
LOCATION DETAILED: RIGHT RADIAL DORSAL HAND
LOCATION DETAILED: RIGHT PROXIMAL DORSAL FOREARM
LOCATION DETAILED: RIGHT SUPERIOR FOREHEAD
LOCATION DETAILED: LEFT DISTAL POSTERIOR THIGH
LOCATION DETAILED: RIGHT MEDIAL BREAST 4-5:00 REGION
LOCATION DETAILED: UPPER STERNUM
LOCATION DETAILED: RIGHT MEDIAL SUPERIOR CHEST
LOCATION DETAILED: RIGHT SUPERIOR MEDIAL FOREHEAD
LOCATION DETAILED: LOWER STERNUM
LOCATION DETAILED: RIGHT ANTERIOR DISTAL THIGH
LOCATION DETAILED: NASAL DORSUM
LOCATION DETAILED: LEFT LATERAL FOREHEAD
LOCATION DETAILED: LEFT ULNAR DORSAL HAND
LOCATION DETAILED: EPIGASTRIC SKIN

## 2024-02-06 ASSESSMENT — LOCATION SIMPLE DESCRIPTION DERM
LOCATION SIMPLE: RIGHT FOREARM
LOCATION SIMPLE: LEFT FOREHEAD
LOCATION SIMPLE: RIGHT BREAST
LOCATION SIMPLE: CHEST
LOCATION SIMPLE: LEFT POSTERIOR THIGH
LOCATION SIMPLE: NOSE
LOCATION SIMPLE: ABDOMEN
LOCATION SIMPLE: RIGHT THIGH
LOCATION SIMPLE: LEFT HAND
LOCATION SIMPLE: RIGHT FOREHEAD
LOCATION SIMPLE: LEFT CHEEK
LOCATION SIMPLE: RIGHT HAND

## 2024-02-06 NOTE — HPI: FULL BODY SKIN EXAMINATION
How Severe Are Your Spot(S)?: moderate
What Type Of Note Output Would You Prefer (Optional)?: Standard Output
What Is The Reason For Today's Visit?: Full Body Skin Examination
no

## 2024-02-06 NOTE — PROCEDURE: ADDITIONAL NOTES
Render Risk Assessment In Note?: no
Detail Level: Simple
Additional Notes: Talked about sun spot treatments including laser, bleaching, and medications.

## 2024-02-06 NOTE — PROCEDURE: LIQUID NITROGEN
Detail Level: Detailed
Show Applicator Variable?: Yes
Render Note In Bullet Format When Appropriate: No
Post-Care Instructions: I reviewed with the patient in detail post-care instructions. Patient is to wear sunprotection, and avoid picking at any of the treated lesions. Pt may apply Vaseline to crusted or scabbing areas.
Number Of Freeze-Thaw Cycles: 3 freeze-thaw cycles
Consent: The patient's consent was obtained including but not limited to risks of crusting, scabbing, blistering, scarring, darker or lighter pigmentary change, recurrence, incomplete removal and infection.
Duration Of Freeze Thaw-Cycle (Seconds): 3

## 2024-02-12 ENCOUNTER — OFFICE VISIT (OUTPATIENT)
Dept: URGENT CARE | Facility: CLINIC | Age: 59
End: 2024-02-12
Payer: COMMERCIAL

## 2024-02-12 VITALS
RESPIRATION RATE: 14 BRPM | BODY MASS INDEX: 26.66 KG/M2 | HEART RATE: 80 BPM | WEIGHT: 180 LBS | HEIGHT: 69 IN | DIASTOLIC BLOOD PRESSURE: 64 MMHG | TEMPERATURE: 98.6 F | SYSTOLIC BLOOD PRESSURE: 122 MMHG | OXYGEN SATURATION: 96 %

## 2024-02-12 DIAGNOSIS — I48.0 PAROXYSMAL ATRIAL FIBRILLATION (HCC): ICD-10-CM

## 2024-02-12 DIAGNOSIS — Z79.01 ANTICOAGULATED: ICD-10-CM

## 2024-02-12 DIAGNOSIS — J98.8 RTI (RESPIRATORY TRACT INFECTION): ICD-10-CM

## 2024-02-12 PROCEDURE — 99214 OFFICE O/P EST MOD 30 MIN: CPT | Performed by: FAMILY MEDICINE

## 2024-02-12 PROCEDURE — 3078F DIAST BP <80 MM HG: CPT | Performed by: FAMILY MEDICINE

## 2024-02-12 PROCEDURE — 3074F SYST BP LT 130 MM HG: CPT | Performed by: FAMILY MEDICINE

## 2024-02-12 RX ORDER — BENZONATATE 200 MG/1
200 CAPSULE ORAL 3 TIMES DAILY PRN
Qty: 30 CAPSULE | Refills: 0 | Status: SHIPPED | OUTPATIENT
Start: 2024-02-12

## 2024-02-12 RX ORDER — DOXYCYCLINE 100 MG/1
100 CAPSULE ORAL 2 TIMES DAILY
Qty: 14 CAPSULE | Refills: 0 | Status: SHIPPED | OUTPATIENT
Start: 2024-02-12

## 2024-02-12 ASSESSMENT — ENCOUNTER SYMPTOMS
COUGH: 1
MYALGIAS: 1
FEVER: 1

## 2024-02-12 ASSESSMENT — FIBROSIS 4 INDEX: FIB4 SCORE: 1.190476190476190476

## 2024-02-12 NOTE — LETTER
February 12, 2024         Patient: Zehra Adam   YOB: 1965   Date of Visit: 2/12/2024           To Whom it May Concern:    Zehra Adam was seen in my clinic on 2/12/2024. She may return to work in 1-2 days, excuse missed days work in past week due to illness.    If you have any questions or concerns, please don't hesitate to call.        Sincerely,           Liban Stinson M.D.  Electronically Signed

## 2024-02-12 NOTE — PROGRESS NOTES
Subjective     Zehra Adam is a 58 y.o. female who presents with Cough and Cold Symptoms (Sx 5 days / aches / cough )    - This is a very pleasant 58 y.o. who has come to the walk-in clinic today for approximately 1 week with coughing chest congestion and some colorful nonbloody sputum.  Had some nasal congestion that seems to have improved and a little bit of pain in the left ear.  No nausea vomiting.  Had some low-grade fevers.    History of paroxysmal atrial fibrillation.  Status post ablation last month.    Currently still anticoagulated but will be weaning off her blood thinners in the next month or 2          ALLERGIES:  Codeine     PMH:  Past Medical History:   Diagnosis Date    Atrial fibrillation (HCC)     High cholesterol     Controlled with medication    Hyperlipidemia     Hypothyroidism     Sleep apnea     cpap    Snoring     Thyroid disease         PSH:  Past Surgical History:   Procedure Laterality Date    NO PERTINENT PAST SURGICAL HISTORY      No prior surgeries       MEDS:    Current Outpatient Medications:     benzonatate (TESSALON) 200 MG capsule, Take 1 Capsule by mouth 3 times a day as needed for Cough., Disp: 30 Capsule, Rfl: 0    doxycycline (MONODOX) 100 MG capsule, Take 1 Capsule by mouth 2 times a day., Disp: 14 Capsule, Rfl: 0    apixaban (ELIQUIS) 5mg Tab, Take 1 Tablet by mouth 2 times a day., Disp: 60 Tablet, Rfl: 1    atorvastatin (LIPITOR) 20 MG Tab, Take 1 Tablet by mouth every evening., Disp: 90 Tablet, Rfl: 3    levothyroxine (SYNTHROID) 75 MCG Tab, Take 75 mcg by mouth every morning on an empty stomach., Disp: , Rfl:     aspirin EC (ECOTRIN) 81 MG Tablet Delayed Response, Take 1 Tab by mouth every day. (Patient taking differently: Take 81 mg by mouth every evening.), Disp: 90 Tab, Rfl: 3    flecainide (TAMBOCOR) 50 MG tablet, Take 1.5 Tablets by mouth 2 times a day., Disp: 270 Tablet, Rfl: 1    metoprolol tartrate (LOPRESSOR) 25 MG Tab, Take 1 Tablet by mouth every  "6 hours as needed (palps)., Disp: 90 Tablet, Rfl: 3    Multiple Vitamin (MULTI-VITAMIN PO), Take 1 Tablet by mouth as needed., Disp: , Rfl:     ** I have documented what I find to be significant in regards to past medical, social, family and surgical history  in my HPI or under PMH/PSH/FH review section, otherwise it is noncontributory **           HPI    Review of Systems   Constitutional:  Positive for fever.   HENT:  Positive for congestion and ear pain.    Respiratory:  Positive for cough.    Musculoskeletal:  Positive for myalgias.   All other systems reviewed and are negative.             Objective     /64 (BP Location: Left arm, Patient Position: Sitting, BP Cuff Size: Adult)   Pulse 80   Temp 37 °C (98.6 °F) (Temporal)   Resp 14   Ht 1.753 m (5' 9\")   Wt 81.6 kg (180 lb)   SpO2 96%   BMI 26.58 kg/m²      Physical Exam  Vitals and nursing note reviewed.   Constitutional:       General: She is not in acute distress.     Appearance: Normal appearance. She is well-developed.   HENT:      Head: Normocephalic.      Right Ear: Tympanic membrane normal.      Left Ear: Tympanic membrane normal.      Mouth/Throat:      Mouth: Mucous membranes are moist.      Pharynx: Oropharynx is clear. No oropharyngeal exudate or posterior oropharyngeal erythema.   Cardiovascular:      Rate and Rhythm: Normal rate and regular rhythm.      Heart sounds: Normal heart sounds. No murmur heard.  Pulmonary:      Effort: Pulmonary effort is normal. No respiratory distress.      Breath sounds: Normal breath sounds. No wheezing, rhonchi or rales.   Neurological:      Mental Status: She is alert.      Motor: No abnormal muscle tone.   Psychiatric:         Mood and Affect: Mood normal.         Behavior: Behavior normal.                             Assessment & Plan     1. RTI (respiratory tract infection)  benzonatate (TESSALON) 200 MG capsule    doxycycline (MONODOX) 100 MG capsule      2. Paroxysmal atrial fibrillation (HCC)   "      3. Anticoagulated            - Dx, plan & d/c instructions discussed   - Rest, stay hydrated, OTC  Tylenol as needed      Follow up with your regular primary care providers office within a week to keep them updated and informed of this visit and for regular routine health maintenance check-ups. ER if not improving in 2-3 days or if feeling/getting worse. (If you do not have a primary care provider and need to schedule one you may call [facility] at [phone] to do this).      Patient left in stable condition         Pertinent prior lab work and/or imaging studies in Epic have been reviewed by me today on day of this visit and taken into account for my treatment and plan today    Pertinent PMH/PSH and/or chronic conditions and medications if any were reviewed today and taken into account for my treatment and plan today    Pertinent prior office visit notes in [facility] have been reviewed by me today on day of this visit.    Please note that this dictation may have been created using voice recognition software, if so I have made every reasonable attempt to correct obvious errors, but I expect that there are errors of grammar and possibly content that I did not discover before finalizing the note.

## 2024-02-14 ENCOUNTER — APPOINTMENT (OUTPATIENT)
Dept: RADIOLOGY | Facility: MEDICAL CENTER | Age: 59
End: 2024-02-14
Attending: FAMILY MEDICINE
Payer: COMMERCIAL

## 2024-02-14 DIAGNOSIS — Z12.31 VISIT FOR SCREENING MAMMOGRAM: ICD-10-CM

## 2024-02-23 LAB — EKG IMPRESSION: NORMAL

## 2024-02-25 ENCOUNTER — OFFICE VISIT (OUTPATIENT)
Dept: URGENT CARE | Facility: CLINIC | Age: 59
End: 2024-02-25
Payer: COMMERCIAL

## 2024-02-25 VITALS
RESPIRATION RATE: 20 BRPM | OXYGEN SATURATION: 100 % | TEMPERATURE: 98.1 F | HEART RATE: 74 BPM | BODY MASS INDEX: 26.93 KG/M2 | SYSTOLIC BLOOD PRESSURE: 124 MMHG | DIASTOLIC BLOOD PRESSURE: 74 MMHG | HEIGHT: 69 IN | WEIGHT: 181.8 LBS

## 2024-02-25 DIAGNOSIS — R05.8 POST-VIRAL COUGH SYNDROME: ICD-10-CM

## 2024-02-25 PROCEDURE — 3074F SYST BP LT 130 MM HG: CPT | Performed by: PHYSICIAN ASSISTANT

## 2024-02-25 PROCEDURE — 3078F DIAST BP <80 MM HG: CPT | Performed by: PHYSICIAN ASSISTANT

## 2024-02-25 PROCEDURE — 99214 OFFICE O/P EST MOD 30 MIN: CPT | Performed by: PHYSICIAN ASSISTANT

## 2024-02-25 RX ORDER — BUDESONIDE AND FORMOTEROL FUMARATE DIHYDRATE 160; 4.5 UG/1; UG/1
2 AEROSOL RESPIRATORY (INHALATION) 2 TIMES DAILY
Qty: 6 G | Refills: 0 | Status: SHIPPED | OUTPATIENT
Start: 2024-02-25

## 2024-02-25 RX ORDER — ALBUTEROL SULFATE 90 UG/1
2 AEROSOL, METERED RESPIRATORY (INHALATION) EVERY 6 HOURS PRN
Qty: 8.5 G | Refills: 0 | Status: SHIPPED | OUTPATIENT
Start: 2024-02-25

## 2024-02-25 ASSESSMENT — ENCOUNTER SYMPTOMS
SHORTNESS OF BREATH: 0
SORE THROAT: 0
PALPITATIONS: 0
FEVER: 0
CHILLS: 0
DIZZINESS: 0
HEADACHES: 0
WHEEZING: 0
COUGH: 1

## 2024-02-25 ASSESSMENT — FIBROSIS 4 INDEX: FIB4 SCORE: 1.190476190476190476

## 2024-02-25 NOTE — PROGRESS NOTES
Subjective:     CHIEF COMPLAINT  Chief Complaint   Patient presents with    Follow-Up     X2weeks  consistent cough/       HPI  Zehra Adam is a very pleasant 58 y.o. female who presents to the clinic with a persistent dry cough x 2 weeks.  Patient was recently seen in clinic on 2/12/2024 for respiratory tract infection.  She completed a 7-day course of doxycycline.  States after completing the doxycycline her sputum became clear if she did cough any up.  States she does not feel sick however continues to have a dry lingering cough.  Has concerns for potential reactive airway disease.  No prior history of asthma or COPD.  Patient does have a history of A-fib for which she recently underwent an ablation on 1/11/2024.  Patient has not been running a fever.  No body aches or chills.    REVIEW OF SYSTEMS  Review of Systems   Constitutional:  Negative for chills, fever and malaise/fatigue.   HENT:  Negative for congestion and sore throat.    Respiratory:  Positive for cough. Negative for shortness of breath and wheezing.    Cardiovascular:  Negative for chest pain and palpitations.   Skin:  Negative for rash.   Neurological:  Negative for dizziness and headaches.       PAST MEDICAL HISTORY  Patient Active Problem List    Diagnosis Date Noted    Primary hypercholesterolemia 08/17/2023    Secondary hypercoagulable state (HCC) 02/25/2022    Coronary artery calcification seen on CT scan 02/25/2022    Elevated lipoprotein(a) 08/24/2021    Agatston CAC score 200-399 08/20/2021    Atherosclerosis of both carotid arteries 08/20/2021    Long term (current) use of antithrombotics/antiplatelets 08/20/2021    Hypothyroidism     Obstructive sleep apnea syndrome 11/24/2020    Healthcare maintenance 02/04/2020    Hyperlipidemia 02/04/2020    Atrial fibrillation (HCC) 12/26/2019       SURGICAL HISTORY   has a past surgical history that includes no pertinent past surgical history.    ALLERGIES  Allergies   Allergen  "Reactions    Codeine      rash       CURRENT MEDICATIONS  Home Medications       Reviewed by John Quijano P.A.-C. (Physician Assistant) on 02/25/24 at 1408  Med List Status: <None>     Medication Last Dose Status   apixaban (ELIQUIS) 5mg Tab Taking Active   aspirin EC (ECOTRIN) 81 MG Tablet Delayed Response Taking Active   atorvastatin (LIPITOR) 20 MG Tab Taking Active   benzonatate (TESSALON) 200 MG capsule  Active   doxycycline (MONODOX) 100 MG capsule  Active   flecainide (TAMBOCOR) 50 MG tablet  Active   levothyroxine (SYNTHROID) 75 MCG Tab Taking Active   metoprolol tartrate (LOPRESSOR) 25 MG Tab PRN Active   Multiple Vitamin (MULTI-VITAMIN PO) PRN Active                    SOCIAL HISTORY  Social History     Tobacco Use    Smoking status: Never    Smokeless tobacco: Never   Vaping Use    Vaping Use: Never used   Substance and Sexual Activity    Alcohol use: Yes     Alcohol/week: 4.2 - 5.4 oz     Types: 1 Glasses of wine, 6 - 8 Standard drinks or equivalent per week     Comment: 4-5 a week    Drug use: No    Sexual activity: Not on file       FAMILY HISTORY  Family History   Problem Relation Age of Onset    Thyroid Mother     Heart Disease Mother         Afib, TAVR    Hypertension Mother     Other Mother     Diabetes Father     Thyroid Sister     Heart Disease Brother     Sleep Apnea Brother     Hyperlipidemia Brother     Heart Attack Maternal Grandmother     Heart Attack Brother         ? MI (69)    Arrythmia Brother           Objective:     VITAL SIGNS: /74 (BP Location: Left arm, Patient Position: Sitting)   Pulse 74   Temp 36.7 °C (98.1 °F) (Temporal)   Resp 20   Ht 1.753 m (5' 9\")   Wt 82.5 kg (181 lb 12.8 oz)   SpO2 100%   BMI 26.85 kg/m²     PHYSICAL EXAM  Physical Exam  Constitutional:       General: She is not in acute distress.     Appearance: Normal appearance. She is not ill-appearing, toxic-appearing or diaphoretic.   HENT:      Head: Normocephalic and atraumatic.      Nose: Nose " normal.      Mouth/Throat:      Mouth: Mucous membranes are moist.      Pharynx: No oropharyngeal exudate or posterior oropharyngeal erythema.   Eyes:      Conjunctiva/sclera: Conjunctivae normal.   Cardiovascular:      Rate and Rhythm: Normal rate and regular rhythm.      Pulses: Normal pulses.      Heart sounds: Normal heart sounds.   Pulmonary:      Effort: Pulmonary effort is normal.      Breath sounds: Normal breath sounds. No wheezing, rhonchi or rales.      Comments: Dry cough throughout exam  Musculoskeletal:      Cervical back: Normal range of motion.   Neurological:      General: No focal deficit present.      Mental Status: She is alert and oriented to person, place, and time. Mental status is at baseline.         Assessment/Plan:     1. Post-viral cough syndrome  - albuterol 108 (90 Base) MCG/ACT Aero Soln inhalation aerosol; Inhale 2 Puffs every 6 hours as needed for Shortness of Breath.  Dispense: 8.5 g; Refill: 0  - budesonide-formoterol (SYMBICORT) 160-4.5 MCG/ACT Aerosol; Inhale 2 Puffs 2 times a day.  Dispense: 6 g; Refill: 0      MDM/Comments:    Patient's symptoms are consistent with postviral inflammatory cough.  Past medical history significant for A-fib.  We would like to avoid oral steroids if possible at this time.  Agreed to start the patient on a course of Symbicort and albuterol.  On exam lungs are clear to auscultation.  No wheezes rhonchi or rales.  Low suspicion for any bacterial pneumonia.  Recently completed a 7-day course of doxycycline.  Return precautions discussed for any worsening symptoms.    Differential diagnosis, natural history, supportive care, and indications for immediate follow-up discussed. All questions answered. Patient agrees with the plan of care.    Follow-up as needed if symptoms worsen or fail to improve to PCP, Urgent care or Emergency Room.    I have personally reviewed prior external notes and test results pertinent to today's visit.  I have independently  reviewed and interpreted all diagnostics ordered during this urgent care acute visit.   Discussed management options (risks,benefits, and alternatives to treatment). Pt expresses understanding and the treatment plan was agreed upon. Questions were encouraged and answered to pt's satisfaction.    Please note that this dictation was created using voice recognition software. I have made a reasonable attempt to correct obvious errors, but I expect that there are errors of grammar and possibly content that I did not discover before finalizing the note.

## 2024-02-26 ENCOUNTER — HOSPITAL ENCOUNTER (OUTPATIENT)
Dept: RADIOLOGY | Facility: MEDICAL CENTER | Age: 59
End: 2024-02-26
Attending: FAMILY MEDICINE
Payer: COMMERCIAL

## 2024-02-26 DIAGNOSIS — Z12.31 VISIT FOR SCREENING MAMMOGRAM: ICD-10-CM

## 2024-02-26 PROCEDURE — 77067 SCR MAMMO BI INCL CAD: CPT
